# Patient Record
Sex: MALE | Race: WHITE | NOT HISPANIC OR LATINO | Employment: OTHER | URBAN - NONMETROPOLITAN AREA
[De-identification: names, ages, dates, MRNs, and addresses within clinical notes are randomized per-mention and may not be internally consistent; named-entity substitution may affect disease eponyms.]

---

## 2017-02-05 ENCOUNTER — TRANSFERRED RECORDS (OUTPATIENT)
Dept: HEALTH INFORMATION MANAGEMENT | Facility: OTHER | Age: 62
End: 2017-02-05

## 2017-08-14 ENCOUNTER — HISTORY (OUTPATIENT)
Dept: EMERGENCY MEDICINE | Facility: OTHER | Age: 62
End: 2017-08-14

## 2017-08-16 ENCOUNTER — HISTORY (OUTPATIENT)
Dept: EMERGENCY MEDICINE | Facility: OTHER | Age: 62
End: 2017-08-16

## 2017-08-16 ENCOUNTER — HOSPITAL ENCOUNTER (EMERGENCY)
Dept: EMERGENCY MEDICINE | Facility: OTHER | Age: 62
Discharge: HOME | End: 2017-08-16
Payer: COMMERCIAL

## 2017-08-16 DIAGNOSIS — M10.472 OTHER SECONDARY GOUT, LEFT ANKLE AND FOOT: ICD-10-CM

## 2017-08-24 ENCOUNTER — COMMUNICATION - GICH (OUTPATIENT)
Dept: MEDSURG UNIT | Facility: OTHER | Age: 62
End: 2017-08-24

## 2017-11-10 ENCOUNTER — TRANSFERRED RECORDS (OUTPATIENT)
Dept: HEALTH INFORMATION MANAGEMENT | Facility: OTHER | Age: 62
End: 2017-11-10

## 2017-12-01 ENCOUNTER — TRANSFERRED RECORDS (OUTPATIENT)
Dept: HEALTH INFORMATION MANAGEMENT | Facility: OTHER | Age: 62
End: 2017-12-01

## 2017-12-28 NOTE — TELEPHONE ENCOUNTER
Patient Information     Patient Name MRN Josué Robles [id number] Male 1955      Telephone Encounter by Lizeth Wadsworth RN at 2017  3:33 PM     Author:  Lizeth Wadsworth RN Service:  (none) Author Type:  NURS- Registered Nurse     Filed:  2017  3:37 PM Encounter Date:  2017 Status:  Signed     :  Lizeth Wadsworth RN (NURS- Registered Nurse)            Patient called regarding follow up appointment. Patient unsure of location. Researched and identified MD he is seeing practices at [facility] in [place]

## 2017-12-29 NOTE — ED NOTES
"Patient Information     Patient Name MRN Josué Robles [id number] Male 1955      ED Nursing Note by Arielle Chau RN at 2017  8:58 PM     Author:  Arielle Chau RN  Service:  (none) Author Type:  NURS- Registered Nurse     Filed:  2017  9:09 PM  Date of Service:  2017  8:58 PM Status:  Addendum     :  Arielle Chau RN (NURS- Registered Nurse)        Related Notes: Original Note by Arielle Chau RN (NURS- Registered Nurse) filed at 2017  9:00 PM            Pt admit to [facility], ambulatory, limp noted.    ED Nursing Triage Note (General)   ________________________________    Significant symptoms per patient include pt had a cramp in his foot last NOC (and the night before), it happened again today, he looked at his foot and it's red, swollen, and warm to the touch.  He was just discharged from the hospital at 1500 today.   Patient is alert with cooperative.behavior, Breathing noted as easy, non labored and General appearance noted as unremarkable with skin of normal color    Pre hospital prior living situation: Home     /84  Pulse 93  Temp 98  F (36.7  C)  Resp 18  Ht 1.765 m (5' 9.5\")  Wt 63.5 kg (140 lb)  SpO2 98%  BMI 20.38 kg/m2           "

## 2017-12-29 NOTE — ED PROVIDER NOTES
Patient Information     Patient Name MRN Sex Josué Mckeon [id number] Male 1955      ED Provider Note by Bharath Reyes DO at 2017  9:24 PM     Author:  Bharath Reyes DO Service:  (none) Author Type:  Physician     Filed:  2017 12:10 AM Date of Service:  2017  9:24 PM Status:  Signed     :  Bharath Reyes DO (Physician)            Patient:  Josué Álvarez  MRN: [id number] : 1955  Date of Service:  17      Subjective:    HPI:  Josué Álvarez is a 61 y.o. male w/ PMH pertinent for alcohol abuse presenting to the ED with cc of L foot pain & swelling.  Onset last night with pain, then mild swelling & small amount of redness to the front part of his foot.  No prior hx.  No recent trauma or injuries.  Admits to a heavy drinking binge this weekend with an unsure, but excessive amount of alcohol consumed.  No other complaints or concerns.  Was in the hospital recently for diverticulitis, and d/c'd today on cipro & flagyl which he has been taking as prescribed.  No N/V/D.  Feels otherwise well & at his baseline.    ROS:    A 10 point review of systems was conducted; pertinent positives and negatives as per HPI.    PMH:   Past Medical History:     Diagnosis  Date     Diverticulitis        PSH: No past surgical history on file.    SOC:   Social History        Substance Use Topics          Smoking status:   Former Smoker      Quit date:  10/30/2014      Smokeless tobacco:   Never Used      Alcohol use   Yes      Comment: rare          FAM: No family history on file.     ALL:  No Known Allergies    Meds:   No current facility-administered medications on file prior to encounter.      Current Outpatient Prescriptions on File Prior to Encounter       Medication  Sig Dispense Refill     atorvastatin (LIPITOR) 20 mg tablet Take 20 mg by mouth at bedtime.       busPIRone (BUSPAR) 15 mg tablet Take 15 mg by mouth 2 times daily if needed for Other (Specify).        "ciprofloxacin HCl (CIPRO) 500 mg tablet Take 1 tablet by mouth 2 times daily for 13 days. Indications: ABDOMEN/PELVIS INFECTION 26 tablet 0     fluticasone (50 mcg per actuation) nasal solution (FLONASE) Inhale 1 Spray into both nostrils once daily.       metroNIDAZOLE (FLAGYL) 500 mg tablet Take 1 tablet by mouth 3 times daily for 13 days. Indications: ABDOMEN/PELVIS INFECTION 39 tablet 0       Objective:    VS:   /84  Pulse 93  Temp 98  F (36.7  C)  Resp 18  Ht 1.765 m (5' 9.5\")  Wt 63.5 kg (140 lb)  SpO2 98%  BMI 20.38 kg/m2     Physical Exam:     Gen:  Alert, nontoxic, NAD.     HEENT:  Normocephalic, PERRLA, no scleral icterus.     Neck:  Trachea midline, supple   Lungs:  CTAB, no obvious w/c/r.    Cardio:  Regular, s1/s2, no obvious m/r/g   Abd:  Soft, nontender, spleen non-palpable.  No hepatomegaly.  No CVA tenderness.   Ext:  No peripheral edema.     Neuro:  A&Ox4, CN II-XII grossly intact.  Ambulatory with normal gait.     MSK: no obvious areas of cellulitis.  Normal ROM of major joints.  Markedly tender L foot with mild swelling to the anterior foot and minimal redness.     Skin: warm, dry, no obvious rash.      Orders Place This Encounter:   No orders of the defined types were placed in this encounter.      RN & Ancillary Notes:   I have reviewed nursing & ancillary notes, and agree with protocol as initiated.      Medical Decision Making:    Physical exam & hx consistent with an acute flare of gout.  Pt declined imaging as stated he for sure did not injure his foot.  No clinical evidence of cellulitis & no obvious skin break down to suggest it.  No obvious large target for arthrocentesis so will defer it.  Discussed the pt's heavy drinking and this likely being a precipitant for his acute flare.  Plan will be for ibuprofen for several days for pain & inflammation.  F/u with PCP in 2 days or return back to the ED with any worsening complaints or concerns.    All questions & concerns addressed " prior to discharge.      Final Clinical Impression:  Likely gout of L foot    PROCEDURES:   none    CRITICAL CARE:  I personally provided critical care time of n/a minutes.  (This excludes all time required for performed procedures).      Bharath Reyes DO  8/16/2017  9:24 PM  [facility]  Combined Emergency / Internal Medicine Physician

## 2017-12-29 NOTE — ED NOTES
Patient Information     Patient Name MRN Sex Josué Mckeon [id number] Male 1955      ED Nursing Note by Arielle Chau RN at 2017  9:36 PM     Author:  Arielle Chau RN Service:  (none) Author Type:  NURS- Registered Nurse     Filed:  2017  9:36 PM Date of Service:  2017  9:36 PM Status:  Signed     :  Arielle Chau RN (NURS- Registered Nurse)            ED Nursing Discharge Note  ________________________________    Josué Álvarez will be discharged via Private Car    Patient  Verbalized understanding of discharge instructions including medication administration and recommended follow up care as noted on discharge instructions.  Written discharge instructions given, denies any further questions. Prescriptions were printed and sent with patient. Belongings sent with patient.  Barriers to learning identified and addressed:  None observed    Pain Level:  Acute Pain Intensity (0-10): Worst pain imaginable    IV Fluids: N/A.

## 2017-12-29 NOTE — ED NOTES
Patient Information     Patient Name MRN Sex Josué Mckeon [id number] Male 1955      ED Nursing Note by Arielle Chau RN at 2017  9:10 PM     Author:  Arielle Cahu RN Service:  (none) Author Type:  NURS- Registered Nurse     Filed:  2017  9:18 PM Date of Service:  2017  9:10 PM Status:  Signed     :  Arielle Chau RN (NURS- Registered Nurse)            ED Nursing Assessment  ________________________________    GENERAL APPEARANCE:   Alert  EXTREMITIES/SKIN:   Redness and swelling of left foot  NEUROLOGIC:   No apparent problem  MUSCULOSKELETAL:   EXTREMETIES   Affected Extremity Left lower extremity foot  CMS Impaired pain/tenderness tender

## 2018-02-06 ENCOUNTER — DOCUMENTATION ONLY (OUTPATIENT)
Dept: FAMILY MEDICINE | Facility: OTHER | Age: 63
End: 2018-02-06

## 2018-02-06 PROBLEM — K57.92 ACUTE DIVERTICULITIS: Status: ACTIVE | Noted: 2017-08-15

## 2018-02-06 RX ORDER — BUSPIRONE HYDROCHLORIDE 15 MG/1
15 TABLET ORAL 2 TIMES DAILY PRN
COMMUNITY
End: 2019-08-06

## 2018-02-06 RX ORDER — IBUPROFEN 600 MG/1
600 TABLET, FILM COATED ORAL 4 TIMES DAILY PRN
COMMUNITY
Start: 2017-08-16 | End: 2018-09-18

## 2018-02-06 RX ORDER — ATORVASTATIN CALCIUM 20 MG/1
20 TABLET, FILM COATED ORAL AT BEDTIME
COMMUNITY
Start: 2017-08-10 | End: 2020-01-06

## 2018-02-06 RX ORDER — FLUTICASONE PROPIONATE 50 MCG
1 SPRAY, SUSPENSION (ML) NASAL DAILY
COMMUNITY
End: 2019-10-20

## 2018-02-27 ENCOUNTER — TELEPHONE (OUTPATIENT)
Dept: SURGERY | Facility: OTHER | Age: 63
End: 2018-02-27

## 2018-02-27 NOTE — TELEPHONE ENCOUNTER
OK to schedule for a diagnostic colonoscopy due to recent diverticulitis-in September per records. Ellie Roberto MD on 2/27/2018 at 12:12 PM

## 2018-02-27 NOTE — TELEPHONE ENCOUNTER
Screening Questions for the Scheduling of Screening Colonoscopies   (If Colonoscopy is diagnostic, Provider should review the chart before scheduling.)  Are you younger than 50 or older than 80?  NO   Do you take aspirin or fish oil?   BOTH    (if yes, tell patient to stop 1 week prior to Colonoscopy)  Do you take warfarin (Coumadin), clopidogrel (Plavix), apixaban (Eliquis), dabigatram (Pradaxa), rivaroxaban (Xarelto) or any blood thinner? NO   Do you use oxygen at home?  NO   Do you have kidney disease? NO   Are you on dialysis? NO   Have you had a stroke or heart attack in the last year? NO   Have you had a stent in your heart or any blood vessel in the last year? NO   Have you had a transplant of any organ? NO   Have you had a colonoscopy or upper endoscopy (EGD) before? YES          When?  2014  -  [facility]   Date of scheduled Colonoscopy. 04/06/2018  Provider ANN   Pharmacy - Patient already has prep - He had been scheduled at [facility] , but wanted to have done at [facility]    ** Patient needed a Friday due to travel **

## 2018-02-27 NOTE — TELEPHONE ENCOUNTER
Patient called to schedule a colonoscopy. Notes have been faxed from [facility] . Please advise.  Thank you.  Lazara Garcia on 2/27/2018 at 10:17 AM

## 2018-04-06 ENCOUNTER — HOSPITAL ENCOUNTER (OUTPATIENT)
Facility: OTHER | Age: 63
Discharge: HOME | End: 2018-04-06
Attending: SURGERY | Admitting: SURGERY
Payer: COMMERCIAL

## 2018-04-06 ENCOUNTER — SURGERY (OUTPATIENT)
Age: 63
End: 2018-04-06

## 2018-04-06 ENCOUNTER — ANESTHESIA (OUTPATIENT)
Dept: SURGERY | Facility: OTHER | Age: 63
End: 2018-04-06
Payer: COMMERCIAL

## 2018-04-06 ENCOUNTER — ANESTHESIA EVENT (OUTPATIENT)
Dept: SURGERY | Facility: OTHER | Age: 63
End: 2018-04-06
Payer: COMMERCIAL

## 2018-04-06 VITALS
HEART RATE: 66 BPM | TEMPERATURE: 97.4 F | DIASTOLIC BLOOD PRESSURE: 87 MMHG | RESPIRATION RATE: 16 BRPM | WEIGHT: 150.6 LBS | SYSTOLIC BLOOD PRESSURE: 126 MMHG | OXYGEN SATURATION: 98 %

## 2018-04-06 PROCEDURE — 27210995 ZZH RX 272: Performed by: SURGERY

## 2018-04-06 PROCEDURE — 45385 COLONOSCOPY W/LESION REMOVAL: CPT | Mod: PT | Performed by: SURGERY

## 2018-04-06 PROCEDURE — 25000132 ZZH RX MED GY IP 250 OP 250 PS 637: Performed by: SURGERY

## 2018-04-06 PROCEDURE — 45380 COLONOSCOPY AND BIOPSY: CPT | Performed by: SURGERY

## 2018-04-06 PROCEDURE — 25000125 ZZHC RX 250: Performed by: NURSE ANESTHETIST, CERTIFIED REGISTERED

## 2018-04-06 PROCEDURE — 45385 COLONOSCOPY W/LESION REMOVAL: CPT | Performed by: NURSE ANESTHETIST, CERTIFIED REGISTERED

## 2018-04-06 PROCEDURE — 88305 TISSUE EXAM BY PATHOLOGIST: CPT | Performed by: SURGERY

## 2018-04-06 PROCEDURE — 25000128 H RX IP 250 OP 636: Performed by: SURGERY

## 2018-04-06 PROCEDURE — 45385 COLONOSCOPY W/LESION REMOVAL: CPT | Performed by: ANESTHESIOLOGY

## 2018-04-06 PROCEDURE — 25000128 H RX IP 250 OP 636: Performed by: NURSE ANESTHETIST, CERTIFIED REGISTERED

## 2018-04-06 PROCEDURE — 40000010 ZZH STATISTIC ANES STAT CODE-CRNA PER MINUTE: Performed by: SURGERY

## 2018-04-06 RX ORDER — ONDANSETRON 2 MG/ML
4 INJECTION INTRAMUSCULAR; INTRAVENOUS
Status: DISCONTINUED | OUTPATIENT
Start: 2018-04-06 | End: 2018-04-06 | Stop reason: HOSPADM

## 2018-04-06 RX ORDER — FLUMAZENIL 0.1 MG/ML
0.2 INJECTION, SOLUTION INTRAVENOUS
Status: CANCELLED | OUTPATIENT
Start: 2018-04-06 | End: 2018-04-06

## 2018-04-06 RX ORDER — NALOXONE HYDROCHLORIDE 0.4 MG/ML
.1-.4 INJECTION, SOLUTION INTRAMUSCULAR; INTRAVENOUS; SUBCUTANEOUS
Status: CANCELLED | OUTPATIENT
Start: 2018-04-06 | End: 2018-04-07

## 2018-04-06 RX ORDER — PROPOFOL 10 MG/ML
INJECTION, EMULSION INTRAVENOUS CONTINUOUS PRN
Status: DISCONTINUED | OUTPATIENT
Start: 2018-04-06 | End: 2018-04-06

## 2018-04-06 RX ORDER — LIDOCAINE HYDROCHLORIDE 20 MG/ML
INJECTION, SOLUTION INFILTRATION; PERINEURAL PRN
Status: DISCONTINUED | OUTPATIENT
Start: 2018-04-06 | End: 2018-04-06

## 2018-04-06 RX ORDER — SIMETHICONE
LIQUID (ML) MISCELLANEOUS PRN
Status: DISCONTINUED | OUTPATIENT
Start: 2018-04-06 | End: 2018-04-06 | Stop reason: HOSPADM

## 2018-04-06 RX ORDER — PROPOFOL 10 MG/ML
INJECTION, EMULSION INTRAVENOUS PRN
Status: DISCONTINUED | OUTPATIENT
Start: 2018-04-06 | End: 2018-04-06

## 2018-04-06 RX ORDER — SODIUM CHLORIDE, SODIUM LACTATE, POTASSIUM CHLORIDE, CALCIUM CHLORIDE 600; 310; 30; 20 MG/100ML; MG/100ML; MG/100ML; MG/100ML
INJECTION, SOLUTION INTRAVENOUS CONTINUOUS
Status: CANCELLED | OUTPATIENT
Start: 2018-04-06

## 2018-04-06 RX ORDER — SODIUM CHLORIDE, SODIUM LACTATE, POTASSIUM CHLORIDE, CALCIUM CHLORIDE 600; 310; 30; 20 MG/100ML; MG/100ML; MG/100ML; MG/100ML
INJECTION, SOLUTION INTRAVENOUS CONTINUOUS
Status: DISCONTINUED | OUTPATIENT
Start: 2018-04-06 | End: 2018-04-06 | Stop reason: HOSPADM

## 2018-04-06 RX ORDER — LIDOCAINE 40 MG/G
CREAM TOPICAL
Status: DISCONTINUED | OUTPATIENT
Start: 2018-04-06 | End: 2018-04-06 | Stop reason: HOSPADM

## 2018-04-06 RX ADMIN — PROPOFOL 50 MG: 10 INJECTION, EMULSION INTRAVENOUS at 08:57

## 2018-04-06 RX ADMIN — PROPOFOL 100 MG: 10 INJECTION, EMULSION INTRAVENOUS at 08:53

## 2018-04-06 RX ADMIN — Medication 0.01 ML: at 09:32

## 2018-04-06 RX ADMIN — WATER 100 ML: 1 IRRIGANT IRRIGATION at 09:32

## 2018-04-06 RX ADMIN — PROPOFOL 50 MG: 10 INJECTION, EMULSION INTRAVENOUS at 09:28

## 2018-04-06 RX ADMIN — SODIUM CHLORIDE, SODIUM LACTATE, POTASSIUM CHLORIDE, AND CALCIUM CHLORIDE: 600; 310; 30; 20 INJECTION, SOLUTION INTRAVENOUS at 08:00

## 2018-04-06 RX ADMIN — PROPOFOL 50 MG: 10 INJECTION, EMULSION INTRAVENOUS at 09:06

## 2018-04-06 RX ADMIN — LIDOCAINE HYDROCHLORIDE 80 MG: 20 INJECTION, SOLUTION INFILTRATION; PERINEURAL at 08:52

## 2018-04-06 RX ADMIN — PROPOFOL 140 MCG/KG/MIN: 10 INJECTION, EMULSION INTRAVENOUS at 08:53

## 2018-04-06 ASSESSMENT — LIFESTYLE VARIABLES: TOBACCO_USE: 1

## 2018-04-06 NOTE — H&P
PRE-PROCEDURE NOTE    CHIEFCOMPLAINT / REASON FOR PROCEDURE:  Screening for polyps and colorectal cancer.    PERTINENT HISTORY   Patient is due for colonoscopy. Previous colonoscopy 3 years ago. Hx of polyps and diverticulitis. No family history of colon polyps or colon cancer.    Past Medical History:   Diagnosis Date     Diverticulitis of intestine without perforation or abscess without bleeding     No Comments Provided       Past Surgical History:   Procedure Laterality Date     COLONOSCOPY           Other:  None  Bleeding tendencies: No     Relevant Family History:  None     Relevant Social History:  None     10 point ROS of systems including Constitutional, Eyes, Respiratory, Cardiovascular, Gastroenterology, Genitourinary, Integumentary, Muscularskeletal, Psychiatric were all negative except for pertinent positives noted in my HPI.      ALLERGIES/SENSITIVITIES: No Known Allergies     CURRENT MEDICATIONS:      No current facility-administered medications on file prior to encounter.   Current Outpatient Prescriptions on File Prior to Encounter:  busPIRone (BUSPAR) 15 MG tablet Take 15 mg by mouth 2 times daily as needed   fluticasone (FLONASE) 50 MCG/ACT spray Spray 1 spray in nostril daily   ibuprofen (ADVIL/MOTRIN) 600 MG tablet Take 600 mg by mouth 4 times daily as needed   atorvastatin (LIPITOR) 20 MG tablet Take 20 mg by mouth At Bedtime           PRE-SEDATION ASSESSMENT:    LUNGS:  CTA B/L, no wheezing or crackles.  Heart & CV:  RRR no murmur.  Intact distal pulses, good cap refill.    Comment(s):      IMPRESSION: 62 year old male in need of screening colonoscopy.    PLAN:  I discussed screening colonoscopy with the patient. Anesthesia coverage requested.    Morgan Perdue    4/6/2018 8:45 AM

## 2018-04-06 NOTE — IP AVS SNAPSHOT
MRN:[id number]                      After Visit Summary   4/6/2018    Josué Álvarez    MRN: [id number]           Thank you!     Thank you for choosing [facility] for your care. Our goal is always to provide you with excellent care. Hearing back from our patients is one way we can continue to improve our services. Please take a few minutes to complete the written survey that you may receive in the mail after you visit with us. Thank you!        Patient Information     Date Of Birth          1955        Designated Caregiver       Most Recent Value    Caregiver    Will someone help with your care after discharge? yes    Name of designated caregiver Mayelin James      About your hospital stay     You were admitted on:  April 6, 2018 You last received care in the:  [facility]    You were discharged on:  April 6, 2018       Who to Call     For medical emergencies, please call 911.  For non-urgent questions about your medical care, please call your primary care provider or clinic, None  For questions related to your surgery, please call your surgery clinic        Attending Provider     Provider Specialty    Morgan Perdue MD Surgery       Primary Care Provider Fax #    Provider Not In System [phone]      Further instructions from your care team       [facility]   Adult Discharge Orders & Instructions     For 12 hours after surgery    1. Get plenty of rest.  A responsible adult must stay with you for at least 12 hours after you leave the hospital.   2. Do not drive or use heavy equipment.  If you have weakness or tingling, don't drive or use heavy equipment until this feeling goes away.  3. Do not drink alcohol.  4. Avoid strenuous or risky activities.  Ask for help when climbing stairs.   5. You may feel lightheaded.  IF so, sit for a few minutes before standing.  Have someone help you get up.   6. If you have nausea (feel sick to your stomach): Drink only  "clear liquids such as apple juice, ginger ale, broth or 7-Up.  Rest may also help.  Be sure to drink enough fluids.  Move to a regular diet as you feel able.  7. You may have a slight fever. Call the doctor if your fever is over 101 F (38.3 C) (taken under the tongue) or lasts longer than 24 hours.  8. You may have a dry mouth, a sore throat, muscle aches or trouble sleeping.  These should go away after 24 hours.  9. Do not make important or legal decisions.   Call your doctor for any of the followin.  Signs of infection (fever, growing tenderness at the surgery site, a large amount of drainage or bleeding, severe pain, foul-smelling drainage, redness, swelling).    2. It has been over 8 to 10 hours since surgery and you are still not able to urinate (pass water).    3.  Headache for over 24 hours.    4.  Numbness, tingling or weakness the day after surgery (if you had spinal anesthesia).  To contact a doctor, call _________[phone]_______________________    Pending Results     No orders found from 2018 to 2018.            Admission Information     Date & Time Provider Department Dept. Phone    2018 Morgan Perdue MD [facility] [phone]      Your Vitals Were     Blood Pressure Pulse Temperature Respirations Weight Pulse Oximetry    113/78 66 97.4  F (36.3  C) 16 68.3 kg (150 lb 9.6 oz) 98%      MyChart Information     [employer] lets you send messages to your doctor, view your test results, renew your prescriptions, schedule appointments and more. To sign up, go to [email or web address] . Click on \"Log in\" on the left side of the screen, which will take you to the Welcome page. Then click on \"Sign up Now\" on the right side of the page.     You will be asked to enter the access code listed below, as well as some personal information. Please follow the directions to create your username and password.     Your access code is: [email or web address]  Expires: 2018  9:57 AM   "   Your access code will  in 90 days. If you need help or a new code, please call your [facility] clinic or [phone].        Care EveryWhere ID     This is your Care EveryWhere ID. This could be used by other organizations to access your [facility] medical records  [id number]        Equal Access to Services     [place] SORIANONOEMI : Hadii aad ku hadasho Soomaali, waaxda luqadaha, qaybta kaalmada adeegyada, waxjuana singh hayrodolfon adehelen darciscooter nelson. So [facility] [phone].    ATENCIÓN: Si habla español, tiene a cooper disposición servicios gratuitos de asistencia lingüística. Llame al [phone].    We comply with applicable federal civil rights laws and [place] laws. We do not discriminate on the basis of race, color, national origin, age, disability, sex, sexual orientation, or gender identity.               Review of your medicines      UNREVIEWED medicines. Ask your doctor about these medicines        Dose / Directions    ASPIRIN ADULT LOW STRENGTH PO        Dose:  81 mg   Take 81 mg by mouth daily   Refills:  0       atorvastatin 20 MG tablet   Commonly known as:  LIPITOR        Dose:  20 mg   Take 20 mg by mouth At Bedtime   Refills:  0       busPIRone 15 MG tablet   Commonly known as:  BUSPAR        Dose:  15 mg   Take 15 mg by mouth 2 times daily as needed   Refills:  0       fluticasone 50 MCG/ACT spray   Commonly known as:  FLONASE        Dose:  1 spray   Spray 1 spray in nostril daily   Refills:  0       ibuprofen 600 MG tablet   Commonly known as:  ADVIL/MOTRIN        Dose:  600 mg   Take 600 mg by mouth 4 times daily as needed   Refills:  0                Protect others around you: Learn how to safely use, store and throw away your medicines at www.disposemymeds.org.             Medication List: This is a list of all your medications and when to take them. Check marks below indicate your daily home schedule. Keep this list as a reference.      Medications           Morning Afternoon Evening Bedtime As  Needed    ASPIRIN ADULT LOW STRENGTH PO   Take 81 mg by mouth daily                                atorvastatin 20 MG tablet   Commonly known as:  LIPITOR   Take 20 mg by mouth At Bedtime                                busPIRone 15 MG tablet   Commonly known as:  BUSPAR   Take 15 mg by mouth 2 times daily as needed                                fluticasone 50 MCG/ACT spray   Commonly known as:  FLONASE   Spray 1 spray in nostril daily                                ibuprofen 600 MG tablet   Commonly known as:  ADVIL/MOTRIN   Take 600 mg by mouth 4 times daily as needed

## 2018-04-06 NOTE — ANESTHESIA CARE TRANSFER NOTE
Patient: Josué Álvarez    Procedure(s):  Colonoscopy - Wound Class: III-Contaminated    Diagnosis: diverticulitis  Diagnosis Additional Information: No value filed.    Anesthesia Type:   MAC     Note:  Airway :Room Air  Patient transferred to:Phase II  Handoff Report: Identifed the Patient, Identified the Reponsible Provider, Reviewed the pertinent medical history, Discussed the surgical course, Reviewed Intra-OP anesthesia mangement and issues during anesthesia, Set expectations for post-procedure period and Allowed opportunity for questions and acknowledgement of understanding      Vitals: (Last set prior to Anesthesia Care Transfer)    CRNA VITALS  4/6/2018 0912 - 4/6/2018 0942      4/6/2018             EKG: NSR                Electronically Signed By: KEKE GARCIA CRNA  April 6, 2018  9:42 AM

## 2018-04-06 NOTE — OP NOTE
PROCEDURE NOTE    SURGEON:Morgan Perdue    PRE-OP DIAGNOSIS:  Screening Colonoscopy, Hx of polyps      POST-OP DIAGNOSIS: Diverticulosis, Colon polyps    PROCEDURE:  Colonoscopy with cold snare polypectomy     SPECIMEN:  Ascending colon polyp, Sigmoid colon polyps, Rectal polyps    ANESTHESIA:  Monitor Anesthesia Care Anesthesiologist: Bharath Watt DO  CRNA: Na Braga APRN CRNA   Coverage requested due to previous intolerance conscious sedation.     ESTIMATED BLOOD LOSS: none    COMPLICATIONS:  None    INDICATION FOR THE PROCEDURE: The patient is a 62 year old male. The patient presents with hx of polyps. I explained to thepatient the risks, benefits and alternatives to screening colonoscopy for evaluating for polyps and colon cancer. We specifically discussed the risks of bleeding, infection, perforation, potential inability to reach the cecum and the risks of sedation. The patient's questions were answered and the patient wished to proceed. Informed consent paperwork was completed.    PROCEDURE: The patient was taken to the endoscopy suite. Appropriate monitors were attached. The patient was placed in the left lateral decubitus position.Timeout was performed confirming the patient's identity and procedure to be performed.  After appropriate sedation was confirmed, digital rectal exam was performed.  There was normal tone and no gross abnormality was noted.  The lubricated colonoscope was introduced into the anus the colon was insufflated with air. The prep quality was adequate. Under direct visualization the scope was advanced to the cecum. The mucosa of colon was inspected while withdrawing the scope. Diverticulosis was present from the sigmoid through the left colon. There was a small polyp at the hepatic flexure removed with cold snare.  There were 2 sigmoid colon polyps removed with cold snare.  There were 3 rectal polyps removed with cold snare.  The scope was retroflexed in the rectum and the  anorectal junction was inspected. No abnormalities were noted. The scope was returned to aneutral position and the colon was decompressed. The scope was removed. The patient tolerated the procedure with no immediately apparent complication. The patient was taken to recovery in stable condition.    FOLLOW UP: RECOMMEND high fiber diet, will call with pathology results.    Morgan Perdue

## 2018-04-06 NOTE — IP AVS SNAPSHOT
[facility]    [address]    [place] [address]    Phone:  [phone]    Fax:  [phone]                                       After Visit Summary   4/6/2018    Josué Álvarez    MRN: [id number]           After Visit Summary Signature Page     I have received my discharge instructions, and my questions have been answered. I have discussed any challenges I see with this plan with the nurse or doctor.    ..........................................................................................................................................  Patient/Patient Representative Signature      ..........................................................................................................................................  Patient Representative Print Name and Relationship to Patient    ..................................................               ................................................  Date                                            Time    ..........................................................................................................................................  Reviewed by Signature/Title    ...................................................              ..............................................  Date                                                            Time

## 2018-04-06 NOTE — DISCHARGE INSTRUCTIONS
[facility]   Adult Discharge Orders & Instructions     For 12 hours after surgery    1. Get plenty of rest.  A responsible adult must stay with you for at least 12 hours after you leave the hospital.   2. Do not drive or use heavy equipment.  If you have weakness or tingling, don't drive or use heavy equipment until this feeling goes away.  3. Do not drink alcohol.  4. Avoid strenuous or risky activities.  Ask for help when climbing stairs.   5. You may feel lightheaded.  IF so, sit for a few minutes before standing.  Have someone help you get up.   6. If you have nausea (feel sick to your stomach): Drink only clear liquids such as apple juice, ginger ale, broth or 7-Up.  Rest may also help.  Be sure to drink enough fluids.  Move to a regular diet as you feel able.  7. You may have a slight fever. Call the doctor if your fever is over 101 F (38.3 C) (taken under the tongue) or lasts longer than 24 hours.  8. You may have a dry mouth, a sore throat, muscle aches or trouble sleeping.  These should go away after 24 hours.  9. Do not make important or legal decisions.   Call your doctor for any of the followin.  Signs of infection (fever, growing tenderness at the surgery site, a large amount of drainage or bleeding, severe pain, foul-smelling drainage, redness, swelling).    2. It has been over 8 to 10 hours since surgery and you are still not able to urinate (pass water).    3.  Headache for over 24 hours.    4.  Numbness, tingling or weakness the day after surgery (if you had spinal anesthesia).  To contact a doctor, call _________[phone]_______________________

## 2018-04-06 NOTE — ANESTHESIA POSTPROCEDURE EVALUATION
Patient: Josué Álvarez    Procedure(s):  Colonoscopy - Wound Class: III-Contaminated    Diagnosis:diverticulitis  Diagnosis Additional Information: No value filed.    Anesthesia Type:  MAC    Note:  Anesthesia Post Evaluation    Patient location during evaluation: Phase 2  Patient participation: Able to fully participate in evaluation  Level of consciousness: awake and alert  Pain management: adequate  Airway patency: patent  Cardiovascular status: acceptable  Respiratory status: acceptable  Hydration status: acceptable  PONV: none             Last vitals:  Vitals:    04/06/18 0740 04/06/18 0942 04/06/18 0945   BP: (!) 142/93 113/78    Pulse: 66     Resp:  16    Temp: 98.1  F (36.7  C) 97.4  F (36.3  C)    SpO2: 98% 98% 98%         Electronically Signed By: KEKE GARCIA CRNA  April 6, 2018  10:35 AM

## 2018-04-06 NOTE — ANESTHESIA PREPROCEDURE EVALUATION
Anesthesia Evaluation     . Pt has had prior anesthetic. Type: MAC           ROS/MED HX    ENT/Pulmonary:  - neg pulmonary ROS   (+)tobacco use, Past use , . .    Neurologic:  - neg neurologic ROS     Cardiovascular:  - neg cardiovascular ROS       METS/Exercise Tolerance:     Hematologic:  - neg hematologic  ROS       Musculoskeletal:  - neg musculoskeletal ROS       GI/Hepatic:  - neg GI/hepatic ROS       Renal/Genitourinary:     (+) chronic renal disease, Pt does not require dialysis, Pt has no history of transplant,       Endo:  - neg endo ROS       Psychiatric:  - neg psychiatric ROS       Infectious Disease:  - neg infectious disease ROS       Malignancy:      - no malignancy   Other:    (+) No chance of pregnancy C-spine cleared: N/A, no H/O Chronic Pain,no other significant disability                    Physical Exam  Normal systems: cardiovascular and pulmonary    Airway     Dental     Cardiovascular       Pulmonary                     Anesthesia Plan      History & Physical Review      ASA Status:  2 .    NPO Status:  > 8 hours    Plan for MAC Maintenance will be TIVA.           Postoperative Care      Consents  Anesthetic plan, risks, benefits and alternatives discussed with: .  Use of blood products discussed: No .   .                         .

## 2018-08-17 ENCOUNTER — TRANSFERRED RECORDS (OUTPATIENT)
Dept: HEALTH INFORMATION MANAGEMENT | Facility: OTHER | Age: 63
End: 2018-08-17

## 2018-09-17 ENCOUNTER — HOSPITAL ENCOUNTER (EMERGENCY)
Facility: OTHER | Age: 63
Discharge: HOME | End: 2018-09-17
Attending: EMERGENCY MEDICINE | Admitting: EMERGENCY MEDICINE
Payer: COMMERCIAL

## 2018-09-17 ENCOUNTER — APPOINTMENT (OUTPATIENT)
Dept: GENERAL RADIOLOGY | Facility: OTHER | Age: 63
End: 2018-09-17
Attending: EMERGENCY MEDICINE
Payer: COMMERCIAL

## 2018-09-17 VITALS
SYSTOLIC BLOOD PRESSURE: 126 MMHG | OXYGEN SATURATION: 97 % | WEIGHT: 150 LBS | DIASTOLIC BLOOD PRESSURE: 84 MMHG | BODY MASS INDEX: 21.47 KG/M2 | RESPIRATION RATE: 16 BRPM | HEART RATE: 72 BPM | TEMPERATURE: 97.5 F | HEIGHT: 70 IN

## 2018-09-17 DIAGNOSIS — K59.00 CONSTIPATION, UNSPECIFIED CONSTIPATION TYPE: ICD-10-CM

## 2018-09-17 DIAGNOSIS — Z87.19 HISTORY OF DIVERTICULITIS: ICD-10-CM

## 2018-09-17 LAB
ALBUMIN SERPL-MCNC: 4.3 G/DL (ref 3.5–5.7)
ALBUMIN UR-MCNC: NEGATIVE MG/DL
ALP SERPL-CCNC: 59 U/L (ref 34–104)
ALT SERPL W P-5'-P-CCNC: 26 U/L (ref 7–52)
ANION GAP SERPL CALCULATED.3IONS-SCNC: 10 MMOL/L (ref 3–14)
APPEARANCE UR: CLEAR
AST SERPL W P-5'-P-CCNC: 17 U/L (ref 13–39)
BACTERIA #/AREA URNS HPF: ABNORMAL /HPF
BASOPHILS # BLD AUTO: 0.1 10E9/L (ref 0–0.2)
BASOPHILS NFR BLD AUTO: 0.4 %
BILIRUB SERPL-MCNC: 0.4 MG/DL (ref 0.3–1)
BILIRUB UR QL STRIP: NEGATIVE
BUN SERPL-MCNC: 24 MG/DL (ref 7–25)
CALCIUM SERPL-MCNC: 10 MG/DL (ref 8.6–10.3)
CAOX CRY #/AREA URNS HPF: ABNORMAL /HPF
CHLORIDE SERPL-SCNC: 102 MMOL/L (ref 98–107)
CO2 SERPL-SCNC: 26 MMOL/L (ref 21–31)
COLOR UR AUTO: YELLOW
CREAT SERPL-MCNC: 1.08 MG/DL (ref 0.7–1.3)
DIFFERENTIAL METHOD BLD: ABNORMAL
EOSINOPHIL # BLD AUTO: 0.5 10E9/L (ref 0–0.7)
EOSINOPHIL NFR BLD AUTO: 4.3 %
ERYTHROCYTE [DISTWIDTH] IN BLOOD BY AUTOMATED COUNT: 12.1 % (ref 10–15)
GFR SERPL CREATININE-BSD FRML MDRD: 69 ML/MIN/1.7M2
GLUCOSE SERPL-MCNC: 99 MG/DL (ref 70–105)
GLUCOSE UR STRIP-MCNC: NEGATIVE MG/DL
HCT VFR BLD AUTO: 40.2 % (ref 40–53)
HGB BLD-MCNC: 14.5 G/DL (ref 13.3–17.7)
HGB UR QL STRIP: ABNORMAL
IMM GRANULOCYTES # BLD: 0 10E9/L (ref 0–0.4)
IMM GRANULOCYTES NFR BLD: 0.2 %
KETONES UR STRIP-MCNC: NEGATIVE MG/DL
LEUKOCYTE ESTERASE UR QL STRIP: NEGATIVE
LYMPHOCYTES # BLD AUTO: 2.1 10E9/L (ref 0.8–5.3)
LYMPHOCYTES NFR BLD AUTO: 16.8 %
MCH RBC QN AUTO: 33.3 PG (ref 26.5–33)
MCHC RBC AUTO-ENTMCNC: 36.1 G/DL (ref 31.5–36.5)
MCV RBC AUTO: 92 FL (ref 78–100)
MONOCYTES # BLD AUTO: 1.2 10E9/L (ref 0–1.3)
MONOCYTES NFR BLD AUTO: 9.9 %
MUCOUS THREADS #/AREA URNS LPF: PRESENT /LPF
NEUTROPHILS # BLD AUTO: 8.5 10E9/L (ref 1.6–8.3)
NEUTROPHILS NFR BLD AUTO: 68.4 %
NITRATE UR QL: NEGATIVE
NON-SQ EPI CELLS #/AREA URNS LPF: ABNORMAL /LPF
PH UR STRIP: 6 PH (ref 5–9)
PLATELET # BLD AUTO: 210 10E9/L (ref 150–450)
POTASSIUM SERPL-SCNC: 4.1 MMOL/L (ref 3.5–5.1)
PROT SERPL-MCNC: 7 G/DL (ref 6.4–8.9)
RBC # BLD AUTO: 4.35 10E12/L (ref 4.4–5.9)
RBC #/AREA URNS AUTO: ABNORMAL /HPF
SODIUM SERPL-SCNC: 138 MMOL/L (ref 134–144)
SOURCE: ABNORMAL
SP GR UR STRIP: 1.02 (ref 1–1.03)
UROBILINOGEN UR STRIP-ACNC: 0.2 EU/DL (ref 0.2–1)
WBC # BLD AUTO: 12.4 10E9/L (ref 4–11)
WBC #/AREA URNS AUTO: ABNORMAL /HPF

## 2018-09-17 PROCEDURE — 81001 URINALYSIS AUTO W/SCOPE: CPT | Performed by: EMERGENCY MEDICINE

## 2018-09-17 PROCEDURE — 99283 EMERGENCY DEPT VISIT LOW MDM: CPT | Mod: Z6 | Performed by: EMERGENCY MEDICINE

## 2018-09-17 PROCEDURE — 25000132 ZZH RX MED GY IP 250 OP 250 PS 637: Performed by: EMERGENCY MEDICINE

## 2018-09-17 PROCEDURE — 74019 RADEX ABDOMEN 2 VIEWS: CPT

## 2018-09-17 PROCEDURE — 36415 COLL VENOUS BLD VENIPUNCTURE: CPT | Performed by: EMERGENCY MEDICINE

## 2018-09-17 PROCEDURE — 99284 EMERGENCY DEPT VISIT MOD MDM: CPT | Mod: 25 | Performed by: EMERGENCY MEDICINE

## 2018-09-17 PROCEDURE — 85025 COMPLETE CBC W/AUTO DIFF WBC: CPT | Performed by: EMERGENCY MEDICINE

## 2018-09-17 PROCEDURE — 80053 COMPREHEN METABOLIC PANEL: CPT | Performed by: EMERGENCY MEDICINE

## 2018-09-17 RX ORDER — HYDROCODONE BITARTRATE AND ACETAMINOPHEN 5; 325 MG/1; MG/1
1 TABLET ORAL ONCE
Status: DISCONTINUED | OUTPATIENT
Start: 2018-09-17 | End: 2018-09-17

## 2018-09-17 RX ORDER — CIPROFLOXACIN 500 MG/1
500 TABLET, FILM COATED ORAL 2 TIMES DAILY
Qty: 20 TABLET | Refills: 0 | Status: SHIPPED | OUTPATIENT
Start: 2018-09-17 | End: 2018-09-27

## 2018-09-17 RX ORDER — METRONIDAZOLE 500 MG/1
500 TABLET ORAL 4 TIMES DAILY
Qty: 40 TABLET | Refills: 0 | Status: SHIPPED | OUTPATIENT
Start: 2018-09-17 | End: 2018-09-27

## 2018-09-17 RX ADMIN — IBUPROFEN 600 MG: 200 TABLET, FILM COATED ORAL at 02:27

## 2018-09-17 RX ADMIN — MAGNESIUM HYDROXIDE 30 ML: 400 SUSPENSION ORAL at 02:26

## 2018-09-17 ASSESSMENT — ENCOUNTER SYMPTOMS
FEVER: 0
SHORTNESS OF BREATH: 0
DYSURIA: 0
AGITATION: 0
VOMITING: 0
CHILLS: 0
CHOKING: 0
NAUSEA: 0
ABDOMINAL PAIN: 1
LIGHT-HEADEDNESS: 0
ARTHRALGIAS: 0

## 2018-09-17 NOTE — ED NOTES
Pt returned from radiology.  Unable to void at this time.  Will put call light on when he is able to go.

## 2018-09-17 NOTE — ED TRIAGE NOTES
Pt presents to the ER with complaint of lower abdominal pain, cramping, nauseated.  Pt has history of diverticulitis.

## 2018-09-17 NOTE — ED PROVIDER NOTES
History     Chief Complaint   Patient presents with     Abdominal Pain     Patient is a 63 year old male presenting with abdominal pain. The history is provided by the patient and a relative.   Abdominal Pain   Associated symptoms: no chest pain, no chills, no dysuria, no fever, no nausea, no shortness of breath and no vomiting      Josué Álvarez is a 63 year old male who is here with his daughter complaining of lower abdominal pain.  He has a history of diverticulitis and they are worried about this.  It just began earlier in the day.  It is pressure and crampy feeling.  He feels like he has to go to the bathroom but when he goes there is not much of anything there.  No fevers or chills.  No nausea vomiting.  Pain is in the suprapubic area and slightly to the left.    Problem List:    Patient Active Problem List    Diagnosis Date Noted     Acute diverticulitis 08/15/2017     Priority: Medium     Abdominal aortic atherosclerosis (H) 10/30/2016     Priority: Medium     Acute diverticulitis of intestine 10/30/2016     Priority: Medium     Continuous LLQ abdominal pain 10/30/2016     Priority: Medium     Cyst of right kidney 10/30/2016     Priority: Medium     Diarrhea 10/30/2016     Priority: Medium     History of prostate biopsy 10/30/2016     Priority: Medium     History of tobacco abuse 10/30/2016     Priority: Medium     Left inguinal hernia 10/30/2016     Priority: Medium        Past Medical History:    Past Medical History:   Diagnosis Date     Abdominal aortic atherosclerosis (H)      Cyst of right kidney      Diverticulitis of intestine without perforation or abscess without bleeding      History of prostate biopsy      History of tobacco abuse      Left inguinal hernia      LLQ abdominal pain        Past Surgical History:    Past Surgical History:   Procedure Laterality Date     COLONOSCOPY       COLONOSCOPY N/A 4/6/2018    Procedure: COMBINED COLONOSCOPY, SINGLE OR MULTIPLE BIOPSY/POLYPECTOMY BY BIOPSY;   "Colonoscopy;  Surgeon: Morgan Perdue MD;  Location:  OR       Family History:    No family history on file.    Social History:  Marital Status:  Single [1]  Social History   Substance Use Topics     Smoking status: Former Smoker     Quit date: 10/30/2014     Smokeless tobacco: Never Used     Alcohol use Yes      Comment: Alcoholic Drinks/day: rare        Medications:      ASPIRIN ADULT LOW STRENGTH PO   atorvastatin (LIPITOR) 20 MG tablet   busPIRone (BUSPAR) 15 MG tablet   ciprofloxacin (CIPRO) 500 MG tablet   fluticasone (FLONASE) 50 MCG/ACT spray   ibuprofen (ADVIL/MOTRIN) 600 MG tablet   metroNIDAZOLE (FLAGYL) 500 MG tablet         Review of Systems   Constitutional: Negative for chills and fever.   HENT: Negative for congestion.    Eyes: Negative for visual disturbance.   Respiratory: Negative for choking and shortness of breath.    Cardiovascular: Negative for chest pain.   Gastrointestinal: Positive for abdominal pain. Negative for nausea and vomiting.   Genitourinary: Negative for dysuria.   Musculoskeletal: Negative for arthralgias.   Skin: Negative for rash.   Neurological: Negative for light-headedness.   Psychiatric/Behavioral: Negative for agitation.       Physical Exam   BP: (!) 153/104  Pulse: 84  Temp: 96.8  F (36  C)  Resp: 16  Height: 176.5 cm (5' 9.5\")  Weight: 68 kg (150 lb)  SpO2: 97 %      Physical Exam   Constitutional: He is oriented to person, place, and time. He appears well-developed and well-nourished. No distress.   HENT:   Head: Normocephalic and atraumatic.   Eyes: Conjunctivae are normal.   Neck: Neck supple.   Cardiovascular: Normal rate, regular rhythm and normal heart sounds.    Pulmonary/Chest: Effort normal and breath sounds normal. No respiratory distress.   Abdominal: Soft. Bowel sounds are normal. He exhibits no distension. There is tenderness in the suprapubic area and left lower quadrant.   Neurological: He is alert and oriented to person, place, and time.   Skin: Skin " is warm. He is not diaphoretic.   Psychiatric: He has a normal mood and affect. His behavior is normal.   Nursing note and vitals reviewed.      ED Course     ED Course     Procedures          X-ray of the abdomen shows stool throughout most of the colon.  No dilated bowel.  No air-fluid levels.  No free air.         Results for orders placed or performed during the hospital encounter of 09/17/18 (from the past 24 hour(s))   CBC with platelets differential   Result Value Ref Range    WBC 12.4 (H) 4.0 - 11.0 10e9/L    RBC Count 4.35 (L) 4.4 - 5.9 10e12/L    Hemoglobin 14.5 13.3 - 17.7 g/dL    Hematocrit 40.2 40.0 - 53.0 %    MCV 92 78 - 100 fl    MCH 33.3 (H) 26.5 - 33.0 pg    MCHC 36.1 31.5 - 36.5 g/dL    RDW 12.1 10.0 - 15.0 %    Platelet Count 210 150 - 450 10e9/L    Diff Method Automated Method     % Neutrophils 68.4 %    % Lymphocytes 16.8 %    % Monocytes 9.9 %    % Eosinophils 4.3 %    % Basophils 0.4 %    % Immature Granulocytes 0.2 %    Absolute Neutrophil 8.5 (H) 1.6 - 8.3 10e9/L    Absolute Lymphocytes 2.1 0.8 - 5.3 10e9/L    Absolute Monocytes 1.2 0.0 - 1.3 10e9/L    Absolute Eosinophils 0.5 0.0 - 0.7 10e9/L    Absolute Basophils 0.1 0.0 - 0.2 10e9/L    Abs Immature Granulocytes 0.0 0 - 0.4 10e9/L   Comprehensive metabolic panel   Result Value Ref Range    Sodium 138 134 - 144 mmol/L    Potassium 4.1 3.5 - 5.1 mmol/L    Chloride 102 98 - 107 mmol/L    Carbon Dioxide 26 21 - 31 mmol/L    Anion Gap 10 3 - 14 mmol/L    Glucose 99 70 - 105 mg/dL    Urea Nitrogen 24 7 - 25 mg/dL    Creatinine 1.08 0.70 - 1.30 mg/dL    GFR Estimate 69 >60 mL/min/1.7m2    GFR Estimate If Black 84 >60 mL/min/1.7m2    Calcium 10.0 8.6 - 10.3 mg/dL    Bilirubin Total 0.4 0.3 - 1.0 mg/dL    Albumin 4.3 3.5 - 5.7 g/dL    Protein Total 7.0 6.4 - 8.9 g/dL    Alkaline Phosphatase 59 34 - 104 U/L    ALT 26 7 - 52 U/L    AST 17 13 - 39 U/L   *UA reflex to Microscopic   Result Value Ref Range    Color Urine Yellow     Appearance Urine  Clear     Glucose Urine Negative NEG^Negative mg/dL    Bilirubin Urine Negative NEG^Negative    Ketones Urine Negative NEG^Negative mg/dL    Specific Gravity Urine 1.025 1.000 - 1.030    Blood Urine Trace (A) NEG^Negative    pH Urine 6.0 5.0 - 9.0 pH    Protein Albumin Urine Negative NEG^Negative mg/dL    Urobilinogen Urine 0.2 0.2 - 1.0 EU/dL    Nitrite Urine Negative NEG^Negative    Leukocyte Esterase Urine Negative NEG^Negative    Source Midstream Urine    Urine Microscopic   Result Value Ref Range    WBC Urine 0 - 5 OTO5^0 - 5 /HPF    RBC Urine 10-25 (A) OTO2^O - 2 /HPF    Squamous Epithelial /LPF Urine Few FEW^Few /LPF    Bacteria Urine Few (A) NEG^Negative /HPF    Calcium Oxalate Few (A) NEG^Negative /HPF    Mucous Urine Present (A) NEG^Negative /LPF       Medications   magnesium hydroxide (MILK OF MAGNESIA) suspension 30 mL (not administered)       Assessments & Plan (with Medical Decision Making)     I have reviewed the nursing notes.    I have reviewed the findings, diagnosis, plan and need for follow up with the patient.  He does appear to have a fair amount of stool in the colon, and his pain could simply be from constipation.  With his history of diverticulitis and a mildly elevated white blood count it is possible he has an early case of this again as well.  I will give him some milk of magnesia now and have him go home.  If this does not work he should try some magnesium citrate.  Once he is cleaned out if his pain does not resolve, then I did give a prescription for Cipro and Flagyl that he could fill at that time.  Patient also has microscopic hematuria.  He has an appointment with his primary physician in about 2 weeks time, I asked that he mention this to his physician so they can recheck this at that time.  Return if worse.    New Prescriptions    CIPROFLOXACIN (CIPRO) 500 MG TABLET    Take 1 tablet (500 mg) by mouth 2 times daily for 10 days    METRONIDAZOLE (FLAGYL) 500 MG TABLET    Take 1  tablet (500 mg) by mouth 4 times daily for 10 days       Final diagnoses:   Constipation, unspecified constipation type   History of diverticulitis       9/17/2018   [facility]     Noble Elder MD  09/17/18 8197

## 2018-09-17 NOTE — ED AVS SNAPSHOT
[facility]    [address]    [place] [address]    Phone:  [phone]    Fax:  [phone]                                       Josué Álvarez   MRN: [id number]    Department:  [facility]   Date of Visit:  9/17/2018           Patient Information     Date Of Birth          1955        Your diagnoses for this visit were:     Constipation, unspecified constipation type     History of diverticulitis        You were seen by Noble Elder MD.        Discharge Instructions         Constipation (Adult)  Constipation means that you have bowel movements that are less frequent than usual. Stools often become very hard and difficult to pass.  Constipation is very common. At some point in life it affects almost everyone. Since everyone's bowel habits are different, what is constipation to one person may not be to another. Your healthcare provider may do tests to diagnose constipation. It depends on what he or she finds when evaluating you.    Symptoms of constipation include:    Abdominal pain    Bloating    Vomiting    Painful bowel movements    Itching, swelling, bleeding, or pain around the anus  Causes  Constipation can have many causes. These include:    Diet low in fiber    Too much dairy    Not drinking enough liquids    Lack of exercise or physical activity. This is especially true for older adults.    Changes in lifestyle or daily routine, including pregnancy, aging, work, and travel    Frequent use or misuse of laxatives    Ignoring the urge to have a bowel movement or delaying it until later    Medicines, such as certain prescription pain medicines, iron supplements, antacids, certain antidepressants, and calcium supplements    Diseases like irritable bowel syndrome, bowel obstructions, stroke, diabetes, thyroid disease, Parkinson disease, hemorrhoids, and colon cancer  Complications  Potential complications of constipation can  include:    Hemorrhoids    Rectal bleeding from hemorrhoids or anal fissures (skin tears)    Hernias    Dependency on laxatives    Chronic constipation    Fecal impaction    Bowel obstruction or perforation  Home care  All treatment should be done after talking with your healthcare provider. This is especially true if you have another medical problems, are taking prescription medicines, or are an older adult. Treatment most often involves lifestyle changes. You may also need medicines. Your healthcare provider will tell you which will work best for you. Follow the advice below to help avoid this problem in the future.  Lifestyle changes  These lifestyle changes can help prevent constipation:    Diet. Eat a high-fiber diet, with fresh fruit and vegetables, and reduce dairy intake, meats, and processed foods    Fluids. It's important to get enough fluids each day. Drink plenty of water when you eat more fiber. If you are on diet that limits the amount of fluid you can have, talk about this with your healthcare provider.    Regular exercise. Check with your healthcare provider first.  Medicines  Take any medicines as directed. Some laxatives are safe to use only every now and then. Others can be taken on a regular basis. Talk with your doctor or pharmacist if you have questions.  Prescription pain medicines can cause constipation. If you are taking this kind of medicine, ask your healthcare provider if you should also take a stool softener.  Medicines you may take to treat constipation include:    Fiber supplements    Stool softeners    Laxatives    Enemas    Rectal suppositories  Follow-up care  Follow up with your healthcare provider if symptoms don't get better in the next few days. You may need to have more tests or see a specialist.  Call 911  Call 911 if any of these occur:    Trouble breathing    Stiff, rigid abdomen that is severely painful to touch    Confusion    Fainting or loss of consciousness    Rapid  heart rate    Chest pain  When to seek medical advice  Call your healthcare provider right away if any of these occur:    Fever of 100.4 F (38 C) or higher, or as directed by your healthcare provider    Failure to resume normal bowel movements    Pain in your abdomen or back gets worse    Nausea or vomiting    Swelling in your abdomen    Blood in the stool    Black, tarry stool    Involuntary weight loss    Weakness  Date Last Reviewed: 12/30/2015 2000-2017 [employer]. [address] [place] [address], [place] [address]. All rights reserved. This information is not intended as a substitute for professional medical care. Always follow your healthcare professional's instructions.      If the milk of magnesia does not work, you could drink a bottle of magnesium citrate.  If this does not work by tomorrow afternoon you could drink 1 more bottle.  If a few hours after you have cleaned out, you are still having the pain, or if you develop fevers or worsening pain, you should start the antibiotics.    When you see your doctor in early October, mention that you had some blood in your urine so that he can recheck it.    24 Hour Appointment Hotline     To schedule an appointment at [facility], please call [phone]. If you don't have a family doctor or clinic, we will help you find one. [facility] clinics are conveniently located to serve the needs of you and your family.           Review of your medicines      START taking        Dose / Directions Last dose taken    ciprofloxacin 500 MG tablet   Commonly known as:  CIPRO   Dose:  500 mg   Quantity:  20 tablet        Take 1 tablet (500 mg) by mouth 2 times daily for 10 days   Refills:  0        metroNIDAZOLE 500 MG tablet   Commonly known as:  FLAGYL   Dose:  500 mg   Quantity:  40 tablet        Take 1 tablet (500 mg) by mouth 4 times daily for 10 days   Refills:  0          Our records show that you are taking the medicines listed below. If these are incorrect,  please call your family doctor or clinic.        Dose / Directions Last dose taken    ASPIRIN ADULT LOW STRENGTH PO   Dose:  81 mg        Take 81 mg by mouth daily   Refills:  0        atorvastatin 20 MG tablet   Commonly known as:  LIPITOR   Dose:  20 mg        Take 20 mg by mouth At Bedtime   Refills:  0        busPIRone 15 MG tablet   Commonly known as:  BUSPAR   Dose:  15 mg        Take 15 mg by mouth 2 times daily as needed   Refills:  0        fluticasone 50 MCG/ACT spray   Commonly known as:  FLONASE   Dose:  1 spray        Spray 1 spray in nostril daily   Refills:  0        ibuprofen 600 MG tablet   Commonly known as:  ADVIL/MOTRIN   Dose:  600 mg        Take 600 mg by mouth 4 times daily as needed   Refills:  0                Prescriptions were sent or printed at these locations (2 Prescriptions)                   [facility] [id number] [place] [facility] - [place], [place] - [address]   [address] [address]    Telephone:  [phone]   Fax:  [phone]   Hours:                  Printed at Department/Unit printer (2 of 2)         ciprofloxacin (CIPRO) 500 MG tablet               metroNIDAZOLE (FLAGYL) 500 MG tablet                Procedures and tests performed during your visit     *UA reflex to Microscopic    CBC with platelets differential    Comprehensive metabolic panel    Urine Microscopic    XR Abdomen 2 Views      Orders Needing Specimen Collection     None      Pending Results     Date and Time Order Name Status Description    9/17/2018 0111 XR Abdomen 2 Views In process             Pending Culture Results     No orders found from 9/15/2018 to 9/18/2018.            Pending Results Instructions     If you had any lab results that were not finalized at the time of your Discharge, you can call the ED Lab Result RN at [phone]. You will be contacted by this team for any positive Lab results or changes in treatment. The nurses are available 7 days a week from 10A to 6:30P.  You can  "leave a message 24 hours per day and they will return your call.        Thank you for choosing [facility]       Thank you for choosing [facility] for your care. Our goal is always to provide you with excellent care. Hearing back from our patients is one way we can continue to improve our services. Please take a few minutes to complete the written survey that you may receive in the mail after you visit with us. Thank you!        [employer] Information     [employer] lets you send messages to your doctor, view your test results, renew your prescriptions, schedule appointments and more. To sign up, go to [email or web address] . Click on \"Log in\" on the left side of the screen, which will take you to the Welcome page. Then click on \"Sign up Now\" on the right side of the page.     You will be asked to enter the access code listed below, as well as some personal information. Please follow the directions to create your username and password.     Your access code is: [email or web address]  Expires: 2018  2:30 AM     Your access code will  in 90 days. If you need help or a new code, please call your [facility] clinic or [phone].        Care EveryWhere ID     This is your Care EveryWhere ID. This could be used by other organizations to access your [facility] medical records  [id number]        Equal Access to Services     INDIANA CHEN : Sena Nascimento, waaxda luqadaha, qaybta kaalmada ana paula, bryanna johnson . So [facility] [phone].    ATENCIÓN: Si habla español, tiene a cooper disposición servicios gratuitos de asistencia lingüística. Llame al [phone].    We comply with applicable federal civil rights laws and [place] laws. We do not discriminate on the basis of race, color, national origin, age, disability, sex, sexual orientation, or gender identity.            After Visit Summary       This is your record. Keep this with you and show to your community pharmacist(s) and doctor(s) " at your next visit.

## 2018-09-17 NOTE — ED AVS SNAPSHOT
[facility]    [address]    [place] [address]    Phone:  [phone]    Fax:  [phone]                                       Josué Álvarez   MRN: [id number]    Department:  [facility]   Date of Visit:  9/17/2018           After Visit Summary Signature Page     I have received my discharge instructions, and my questions have been answered. I have discussed any challenges I see with this plan with the nurse or doctor.    ..........................................................................................................................................  Patient/Patient Representative Signature      ..........................................................................................................................................  Patient Representative Print Name and Relationship to Patient    ..................................................               ................................................  Date                                   Time    ..........................................................................................................................................  Reviewed by Signature/Title    ...................................................              ..............................................  Date                                               Time          22EPIC Rev 08/18

## 2018-09-17 NOTE — DISCHARGE INSTRUCTIONS
[facility] Medicine    [address] [facility] [address]    Phone:  [phone]       Thank You for choosing us for your health care visit. We are glad to serve you and happy to provide you with this summary of your visit. Please help us to ensure we have accurate records. If you find anything that needs to be changed, please let our staff know as soon as possible.          Your Demographic Information     Patient Name Sex Preston Galvez Male 1942       Ethnic Group Patient Race    Not of  or  Origin White      Your Visit Details     Date & Time Provider Department    3/10/2017 8:30 AM ALG IM COUMADIN CLINIC [facility] Internal Medicine      Your Upcoming Appointment*(Max 10)     2017  9:40 AM CDT   Follow-up Visit with January Carpio MD   [facility] ([facility])     [facility] [address]   [phone]            2017 10:00 AM CDT   Anti-Coagulation with ALG IM COUMADIN CLINIC   [facility] ([facility])    [address] [facility] [address]   [phone]            2017 11:00 AM CDT   Office Visit with Aisha Callaway DPM   [facility] ([facility])    [address] [place] [address] [place] [address]   [phone]            2017 10:00 AM CDT   Follow-up Visit with Cassidy Lomeli MD   [facility] ([facility])    [address] [facility] [id number] [phone]              We Ordered or Performed the Following     INR - POINT OF CARE       Conditions Discussed Today or Order-Related Diagnoses        Comments    Atrial fibrillation, unspecified type         Long term current use of anticoagulant    Constipation (Adult)  Constipation means that you have bowel movements that are less frequent than usual. Stools often become very hard and difficult to pass.  Constipation is very common. At some point in life it affects almost everyone. Since everyone's bowel habits are different, what is constipation to one person may not be to another. Your healthcare provider may do tests to diagnose constipation. It depends on what he or she finds when evaluating you.    Symptoms of constipation include:    Abdominal pain    Bloating    Vomiting    Painful bowel movements    Itching, swelling, bleeding, or pain around the anus  Causes  Constipation can have many causes. These include:    Diet low in fiber    Too much dairy    Not drinking enough liquids    Lack of exercise or physical activity. This is especially true for older adults.    Changes in lifestyle or daily routine, including pregnancy, aging, work, and travel    Frequent use or misuse of laxatives    Ignoring the urge to have a bowel movement or delaying it until later    Medicines, such as certain prescription pain medicines, iron supplements, antacids, certain antidepressants, and calcium supplements    Diseases like irritable bowel syndrome, bowel obstructions, stroke, diabetes, thyroid disease, Parkinson disease, hemorrhoids, and colon cancer  Complications  Potential complications of constipation can include:    Hemorrhoids    Rectal bleeding from hemorrhoids or anal fissures (skin tears)    Hernias    Dependency on laxatives    Chronic constipation    Fecal impaction    Bowel obstruction or perforation  Home care  All treatment should be done after talking with your healthcare provider. This is especially true if you have another medical problems, are taking prescription medicines, or are an older adult. Treatment most often involves lifestyle changes. You may also need medicines. Your healthcare provider will tell you which will work best for you. Follow  the advice below to help avoid this problem in the future.  Lifestyle changes  These lifestyle changes can help prevent constipation:    Diet. Eat a high-fiber diet, with fresh fruit and vegetables, and reduce dairy intake, meats, and processed foods    Fluids. It's important to get enough fluids each day. Drink plenty of water when you eat more fiber. If you are on diet that limits the amount of fluid you can have, talk about this with your healthcare provider.    Regular exercise. Check with your healthcare provider first.  Medicines  Take any medicines as directed. Some laxatives are safe to use only every now and then. Others can be taken on a regular basis. Talk with your doctor or pharmacist if you have questions.  Prescription pain medicines can cause constipation. If you are taking this kind of medicine, ask your healthcare provider if you should also take a stool softener.  Medicines you may take to treat constipation include:    Fiber supplements    Stool softeners    Laxatives    Enemas    Rectal suppositories  Follow-up care  Follow up with your healthcare provider if symptoms don't get better in the next few days. You may need to have more tests or see a specialist.  Call 911  Call 911 if any of these occur:    Trouble breathing    Stiff, rigid abdomen that is severely painful to touch    Confusion    Fainting or loss of consciousness    Rapid heart rate    Chest pain  When to seek medical advice  Call your healthcare provider right away if any of these occur:    Fever of 100.4 F (38 C) or higher, or as directed by your healthcare provider    Failure to resume normal bowel movements    Pain in your abdomen or back gets worse    Nausea or vomiting    Swelling in your abdomen    Blood in the stool    Black, tarry stool    Involuntary weight loss    Weakness  Date Last Reviewed: 12/30/2015 2000-2017 [employer]. [address] [place] [address], [place] [address]. All rights reserved. This  therapy         Encounter for therapeutic drug level monitoring           Your Vitals Were     Smoking Status                   Never Smoker           Medications Prescribed or Re-Ordered Today     None      Your Current Medications Are        Disp Refills Start End    albuterol (VENTOLIN HFA) 108 (90 BASE) MCG/ACT inhaler 1 Inhaler 0 3/1/2017     Sig - Route: Inhale 2 puffs into the lungs 4 times daily as needed (sob/wheezing). May substitute with any albuterol HFA covered by insurance. - Inhalation    Class: Eprescribe    atorvastatin (LIPITOR) 40 MG tablet 90 tablet 2 2/20/2017     Sig: TAKE 1 TABLET NIGHTLY    Class: Eprescribe    metFORMIN (GLUCOPHAGE) 500 MG tablet 270 tablet 1 1/30/2017     Sig: TAKE 1 TABLET THREE TIMES A DAY WITH MEALS    Class: Eprescribe    alendronate (FOSAMAX) 70 MG tablet 12 tablet 1 1/23/2017     Sig: TAKE 1 TABLET EVERY 7 DAYS    Class: Eprescribe    metoPROLOL (LOPRESSOR) 50 MG tablet 30 tablet 5 1/23/2017     Sig: TAKE 1 TABLET BY MOUTH EVERY MORNING.    Class: Eprescribe    metoPROLOL (LOPRESSOR) 50 MG tablet 90 tablet 1 12/2/2016     Sig - Route: Take 1 tablet by mouth every morning. - Oral    Class: Eprescribe    Elastic Bandages & Supports (MEDICAL COMPRESSION STOCKINGS) Misc 4 each 1 9/13/2016     Sig - Route: Apply 2 each topically daily. 1 pair medium knee high compression stockings.  Put on every morning, take off before bed. - Topical    Class: Eprescribe    Notes to Pharmacy: Please call patient and let him know hours when he can come in for measurement.    hydrochlorothiazide (MICROZIDE) 12.5 MG capsule 30 capsule 5 9/8/2016     Sig - Route: Take 1 capsule by mouth daily. - Oral    Class: Eprescribe    finasteride (PROSCAR) 5 MG tablet 90 tablet 2 8/19/2016     Sig: TAKE 1 TABLET DAILY    Class: Eprescribe    oxybutynin (DITROPAN-XL) 10 MG 24 hr tablet 90 tablet 3 8/19/2016     Sig - Route: Take 1 tablet by mouth daily. - Oral    Class: Eprescribe    citalopram (CELEXA)  20 MG tablet 90 tablet 3 8/19/2016     Sig - Route: Take 1 tablet by mouth daily. - Oral    Class: Eprescribe    diltiazem (CARTIA XT) 120 MG 24 hr capsule 90 capsule 3 8/19/2016     Sig - Route: Take 1 capsule by mouth daily. - Oral    Class: Eprescribe    metFORMIN (GLUCOPHAGE) 500 MG tablet 90 tablet 0 8/2/2016     Sig - Route: Take 1 tablet by mouth 3 times daily (with meals). - Oral    Class: Eprescribe    Notes to Pharmacy: This is a short term supply until mail order comes.    [employer]ign for Ordering: Accepted by Cassidy Lomeli MD on 8/2/2016 10:19 AM    tamsulosin (FLOMAX) 0.4 MG Cap 30 capsule 3 7/1/2016     Sig - Route: Take 1 capsule by mouth daily. - Oral    Class: Eprescribe    Notes to Pharmacy: Hasn't received mail order yet.    warfarin (COUMADIN) 5 MG tablet 120 tablet 1 4/20/2016     Sig: TAKE ONE AND ONE-HALF TABLETS ON MONDAY AND FRIDAY. TAKE 1 TABLET ALL REMAINING DAYS OF THE WEEK OR AS DIRECTED BY COUMADIN CLINIC.    Class: Eprescribe    fluticasone (FLONASE) 50 MCG/ACT nasal spray 16 g 12 10/13/2014     Sig - Route: Spray 1 spray in each nostril daily. - Nasal    Class: Eprescribe    cholecalciferol (VITAMIN D3) 1000 UNITS tablet        Sig - Route: Take 1,000 Units by mouth daily. - Oral    Class: Historical Med    fish oil-omega-3 fatty acids 1000 MG CAPS        Sig - Route: Take 1 capsule by mouth daily. - Oral    Class: Historical Med    ascorbic acid (VITAMIN C) 250 MG tablet        Sig - Route: Take 250 mg by mouth daily.   - Oral    Class: Historical Med    aspirin 81 MG tablet        Sig - Route: Take 81 mg by mouth daily.   - Oral    Class: Historical Med      Allergies     No Known Allergies      Immunizations History as of 3/10/2017     Name Date    HERPES ZOSTER SHINGLES 4/11/2014 12:13 PM    INFLUENZA QUADRIVALENT 10/30/2015, 10/13/2014    Influenza 9/19/2013, 9/27/2012, 9/1/2010, 8/31/2009, 10/15/2008, 10/19/2007, 10/21/2006, 11/18/2005, 12/14/2004, 10/6/2003, 11/13/2002, 10/9/1999  information is not intended as a substitute for professional medical care. Always follow your healthcare professional's instructions.      If the milk of magnesia does not work, you could drink a bottle of magnesium citrate.  If this does not work by tomorrow afternoon you could drink 1 more bottle.  If a few hours after you have cleaned out, you are still having the pain, or if you develop fevers or worsening pain, you should start the antibiotics.    When you see your doctor in early October, mention that you had some blood in your urine so that he can recheck it.      Influenza High Dose Pres Free 9/8/2016    Pneumococcal Conjugate 13 Valent 9/8/2016    Pneumococcal Polysaccharide Adult 3/11/2011, 9/29/2010, 3/11/2009    Td:Adult type tetanus/diphtheria 9/27/2010, 2/7/2001, 12/11/1998      Problem List as of 3/10/2017     Atrial fibrillation    Coronary atherosclerosis of native coronary artery    BPH (benign prostatic hypertrophy)    Depression    DM (diabetes mellitus)    Vitamin D deficiency    Hypercholesterolemia    PARI on CPAP - pt noncompliant with cpap    Hypertension    Long term (current) use of anticoagulants    Encounter for therapeutic drug level monitoring    Osteopenia    Atrial fibrillation, unspecified type      March 2017 Details    Sun Mon Tue Wed Thu Fri Sat        1               2               3               4                 5               6               7               8               9               10      7.5 mg   See details      11      5 mg           12      5 mg         13      5 mg         14      5 mg         15      5 mg         16      5 mg         17      7.5 mg         18      5 mg           19      5 mg         20      5 mg         21      5 mg         22      5 mg         23      5 mg         24      7.5 mg         25      5 mg           26      5 mg         27      5 mg         28      5 mg         29      5 mg         30      5 mg         31      7.5 mg           Date Details   03/10 This INR check               How to take your warfarin dose     To take:  5 mg Take 1 of the 5 mg tablets.    To take:  7.5 mg Take 1.5 of the 5 mg tablets.           April 2017 Details    Sun Mon Tue Wed Thu Fri Sat           1      5 mg           2      5 mg         3      5 mg         4      5 mg         5      5 mg         6      5 mg         7      7.5 mg         8                 9               10               11               12               13               14               15                 16               17               18                19               20               21               22                 23               24               25               26               27               28               29                 30                      Date Details   No additional details    Date of next INR:  4/7/2017         How to take your warfarin dose     To take:  5 mg Take 1 of the 5 mg tablets.    To take:  7.5 mg Take 1.5 of the 5 mg tablets.           Patient Findings     Positives Change in medications (albuterol inhaler, does not affect INR.)    Negatives Signs/symptoms of thrombosis, Signs/symptoms of bleeding, Laboratory test error suspected, Change in health, Change in alcohol use, Change in activity, Upcoming invasive procedure, Emergency department visit, Upcoming dental procedure, Missed doses, Extra doses, Change in diet/appetite, Hospital admission, Bruising, Other complaints      Results of Testing Done Today     INR - POINT OF CARE      Component Value Standard Range & Units    INR-POC 2.9 2.0 - 3.0                          Patient Instructions    Call your healthcare provider right away if you get any of the following signs or symptoms of bleeding problems:   * Pain, color, or temperature changes to any part of your body   * Headaches, dizziness or weakness   * Unusual bruising (unexplained or growing in size)   * Nosebleeds   * Bleeding gums   * Bleeding from cuts that take a long time to stop   * Menstrual bleeding or vaginal bleeding that is heavier than normal   * Pink or brown urine   * Red or black stools   * Coughing up blood   * Vomiting blood or material that looks like coffee grounds    Update Anticoagulation Clinic (Coumadin Clinic) with any of the following:   * Medication changes (new, stopped or dose changes, this includes over-the-counter medications and supplements)   * Planning on having any surgery, medical or dental procedures   * Diet changes   * Falls  (you should go to Emergency Department immediately for  evaluation, then notify Anticoagulation Clinic)    Please, do not wait until your next appointment to update us on changes.

## 2018-09-18 ENCOUNTER — APPOINTMENT (OUTPATIENT)
Dept: CT IMAGING | Facility: OTHER | Age: 63
DRG: 392 | End: 2018-09-18
Attending: EMERGENCY MEDICINE
Payer: COMMERCIAL

## 2018-09-18 ENCOUNTER — HOSPITAL ENCOUNTER (INPATIENT)
Facility: OTHER | Age: 63
LOS: 2 days | Discharge: HOME | DRG: 392 | End: 2018-09-20
Attending: EMERGENCY MEDICINE | Admitting: INTERNAL MEDICINE
Payer: COMMERCIAL

## 2018-09-18 DIAGNOSIS — K57.92 ACUTE DIVERTICULITIS: ICD-10-CM

## 2018-09-18 DIAGNOSIS — Z87.891 PERSONAL HISTORY OF TOBACCO USE, PRESENTING HAZARDS TO HEALTH: ICD-10-CM

## 2018-09-18 PROBLEM — K57.20 DIVERTICULITIS OF COLON WITH PERFORATION: Status: ACTIVE | Noted: 2018-09-18

## 2018-09-18 PROBLEM — Z86.0101 H/O ADENOMATOUS POLYP OF COLON: Status: ACTIVE | Noted: 2018-09-18

## 2018-09-18 PROBLEM — K57.20 DIVERTICULITIS OF LARGE INTESTINE WITH PERFORATION WITHOUT BLEEDING: Status: ACTIVE | Noted: 2018-09-18

## 2018-09-18 LAB
ALBUMIN SERPL-MCNC: 4.6 G/DL (ref 3.5–5.7)
ALP SERPL-CCNC: 58 U/L (ref 34–104)
ALT SERPL W P-5'-P-CCNC: 23 U/L (ref 7–52)
ANION GAP SERPL CALCULATED.3IONS-SCNC: 14 MMOL/L (ref 3–14)
AST SERPL W P-5'-P-CCNC: 17 U/L (ref 13–39)
BASOPHILS # BLD AUTO: 0.1 10E9/L (ref 0–0.2)
BASOPHILS NFR BLD AUTO: 0.3 %
BILIRUB SERPL-MCNC: 1.5 MG/DL (ref 0.3–1)
BUN SERPL-MCNC: 15 MG/DL (ref 7–25)
CALCIUM SERPL-MCNC: 10 MG/DL (ref 8.6–10.3)
CHLORIDE SERPL-SCNC: 98 MMOL/L (ref 98–107)
CO2 SERPL-SCNC: 23 MMOL/L (ref 21–31)
CREAT SERPL-MCNC: 0.99 MG/DL (ref 0.7–1.3)
DIFFERENTIAL METHOD BLD: ABNORMAL
EOSINOPHIL # BLD AUTO: 0 10E9/L (ref 0–0.7)
EOSINOPHIL NFR BLD AUTO: 0.1 %
ERYTHROCYTE [DISTWIDTH] IN BLOOD BY AUTOMATED COUNT: 12.2 % (ref 10–15)
GFR SERPL CREATININE-BSD FRML MDRD: 77 ML/MIN/1.7M2
GLUCOSE SERPL-MCNC: 100 MG/DL (ref 70–105)
HCT VFR BLD AUTO: 42.7 % (ref 40–53)
HGB BLD-MCNC: 15.3 G/DL (ref 13.3–17.7)
IMM GRANULOCYTES # BLD: 0.1 10E9/L (ref 0–0.4)
IMM GRANULOCYTES NFR BLD: 0.4 %
LACTATE SERPL-SCNC: 1 MMOL/L (ref 0.5–2.2)
LYMPHOCYTES # BLD AUTO: 1.5 10E9/L (ref 0.8–5.3)
LYMPHOCYTES NFR BLD AUTO: 8.1 %
MCH RBC QN AUTO: 33.5 PG (ref 26.5–33)
MCHC RBC AUTO-ENTMCNC: 35.8 G/DL (ref 31.5–36.5)
MCV RBC AUTO: 93 FL (ref 78–100)
MONOCYTES # BLD AUTO: 1.3 10E9/L (ref 0–1.3)
MONOCYTES NFR BLD AUTO: 6.8 %
NEUTROPHILS # BLD AUTO: 15.7 10E9/L (ref 1.6–8.3)
NEUTROPHILS NFR BLD AUTO: 84.3 %
PLATELET # BLD AUTO: 205 10E9/L (ref 150–450)
POTASSIUM SERPL-SCNC: 3.9 MMOL/L (ref 3.5–5.1)
PROT SERPL-MCNC: 8 G/DL (ref 6.4–8.9)
RBC # BLD AUTO: 4.57 10E12/L (ref 4.4–5.9)
SODIUM SERPL-SCNC: 135 MMOL/L (ref 134–144)
WBC # BLD AUTO: 18.6 10E9/L (ref 4–11)

## 2018-09-18 PROCEDURE — 25000128 H RX IP 250 OP 636: Performed by: EMERGENCY MEDICINE

## 2018-09-18 PROCEDURE — 99223 1ST HOSP IP/OBS HIGH 75: CPT | Mod: 25 | Performed by: SURGERY

## 2018-09-18 PROCEDURE — 96365 THER/PROPH/DIAG IV INF INIT: CPT | Performed by: EMERGENCY MEDICINE

## 2018-09-18 PROCEDURE — 80053 COMPREHEN METABOLIC PANEL: CPT | Performed by: EMERGENCY MEDICINE

## 2018-09-18 PROCEDURE — 96375 TX/PRO/DX INJ NEW DRUG ADDON: CPT | Performed by: EMERGENCY MEDICINE

## 2018-09-18 PROCEDURE — 74177 CT ABD & PELVIS W/CONTRAST: CPT

## 2018-09-18 PROCEDURE — 85025 COMPLETE CBC W/AUTO DIFF WBC: CPT | Performed by: EMERGENCY MEDICINE

## 2018-09-18 PROCEDURE — 25000128 H RX IP 250 OP 636: Performed by: INTERNAL MEDICINE

## 2018-09-18 PROCEDURE — 25500064 ZZH RX 255 OP 636: Performed by: EMERGENCY MEDICINE

## 2018-09-18 PROCEDURE — 25000125 ZZHC RX 250: Performed by: INTERNAL MEDICINE

## 2018-09-18 PROCEDURE — 12000000 ZZH R&B MED SURG/OB

## 2018-09-18 PROCEDURE — 93005 ELECTROCARDIOGRAM TRACING: CPT

## 2018-09-18 PROCEDURE — 83605 ASSAY OF LACTIC ACID: CPT | Performed by: EMERGENCY MEDICINE

## 2018-09-18 PROCEDURE — 36415 COLL VENOUS BLD VENIPUNCTURE: CPT | Performed by: EMERGENCY MEDICINE

## 2018-09-18 PROCEDURE — 99285 EMERGENCY DEPT VISIT HI MDM: CPT | Mod: Z6 | Performed by: EMERGENCY MEDICINE

## 2018-09-18 PROCEDURE — 99285 EMERGENCY DEPT VISIT HI MDM: CPT | Mod: 25 | Performed by: EMERGENCY MEDICINE

## 2018-09-18 PROCEDURE — 99222 1ST HOSP IP/OBS MODERATE 55: CPT | Mod: AI | Performed by: INTERNAL MEDICINE

## 2018-09-18 PROCEDURE — 87040 BLOOD CULTURE FOR BACTERIA: CPT | Mod: 91 | Performed by: EMERGENCY MEDICINE

## 2018-09-18 PROCEDURE — 96376 TX/PRO/DX INJ SAME DRUG ADON: CPT | Performed by: EMERGENCY MEDICINE

## 2018-09-18 PROCEDURE — 25000132 ZZH RX MED GY IP 250 OP 250 PS 637: Performed by: INTERNAL MEDICINE

## 2018-09-18 PROCEDURE — 87040 BLOOD CULTURE FOR BACTERIA: CPT | Performed by: EMERGENCY MEDICINE

## 2018-09-18 PROCEDURE — 93010 ELECTROCARDIOGRAM REPORT: CPT | Performed by: INTERNAL MEDICINE

## 2018-09-18 PROCEDURE — 40000275 ZZH STATISTIC RCP TIME EA 10 MIN

## 2018-09-18 RX ORDER — PROCHLORPERAZINE MALEATE 10 MG
10 TABLET ORAL EVERY 6 HOURS PRN
Status: DISCONTINUED | OUTPATIENT
Start: 2018-09-18 | End: 2018-09-20 | Stop reason: HOSPADM

## 2018-09-18 RX ORDER — POTASSIUM CHLORIDE 7.45 MG/ML
10 INJECTION INTRAVENOUS
Status: DISCONTINUED | OUTPATIENT
Start: 2018-09-18 | End: 2018-09-20 | Stop reason: HOSPADM

## 2018-09-18 RX ORDER — MAGNESIUM SULFATE HEPTAHYDRATE 40 MG/ML
4 INJECTION, SOLUTION INTRAVENOUS EVERY 4 HOURS PRN
Status: DISCONTINUED | OUTPATIENT
Start: 2018-09-18 | End: 2018-09-20 | Stop reason: HOSPADM

## 2018-09-18 RX ORDER — FENTANYL CITRATE 50 UG/ML
50 INJECTION, SOLUTION INTRAMUSCULAR; INTRAVENOUS ONCE
Status: COMPLETED | OUTPATIENT
Start: 2018-09-18 | End: 2018-09-18

## 2018-09-18 RX ORDER — PROCHLORPERAZINE 25 MG
25 SUPPOSITORY, RECTAL RECTAL EVERY 12 HOURS PRN
Status: DISCONTINUED | OUTPATIENT
Start: 2018-09-18 | End: 2018-09-20 | Stop reason: HOSPADM

## 2018-09-18 RX ORDER — FENTANYL CITRATE 50 UG/ML
50 INJECTION, SOLUTION INTRAMUSCULAR; INTRAVENOUS
Status: COMPLETED | OUTPATIENT
Start: 2018-09-18 | End: 2018-09-18

## 2018-09-18 RX ORDER — MORPHINE SULFATE 2 MG/ML
2-4 INJECTION, SOLUTION INTRAMUSCULAR; INTRAVENOUS
Status: DISCONTINUED | OUTPATIENT
Start: 2018-09-18 | End: 2018-09-20 | Stop reason: HOSPADM

## 2018-09-18 RX ORDER — KETOROLAC TROMETHAMINE 30 MG/ML
30 INJECTION, SOLUTION INTRAMUSCULAR; INTRAVENOUS EVERY 6 HOURS PRN
Status: DISCONTINUED | OUTPATIENT
Start: 2018-09-18 | End: 2018-09-20 | Stop reason: HOSPADM

## 2018-09-18 RX ORDER — ACETAMINOPHEN 325 MG/1
650 TABLET ORAL EVERY 4 HOURS PRN
Status: DISCONTINUED | OUTPATIENT
Start: 2018-09-18 | End: 2018-09-20 | Stop reason: HOSPADM

## 2018-09-18 RX ORDER — LIDOCAINE 40 MG/G
CREAM TOPICAL
Status: DISCONTINUED | OUTPATIENT
Start: 2018-09-18 | End: 2018-09-20 | Stop reason: HOSPADM

## 2018-09-18 RX ORDER — SODIUM CHLORIDE 9 MG/ML
INJECTION, SOLUTION INTRAVENOUS CONTINUOUS
Status: DISCONTINUED | OUTPATIENT
Start: 2018-09-18 | End: 2018-09-18

## 2018-09-18 RX ORDER — NICOTINE 21 MG/24HR
1 PATCH, TRANSDERMAL 24 HOURS TRANSDERMAL DAILY PRN
Status: DISCONTINUED | OUTPATIENT
Start: 2018-09-18 | End: 2018-09-20 | Stop reason: HOSPADM

## 2018-09-18 RX ORDER — ONDANSETRON 4 MG/1
4 TABLET, ORALLY DISINTEGRATING ORAL EVERY 6 HOURS PRN
Status: DISCONTINUED | OUTPATIENT
Start: 2018-09-18 | End: 2018-09-20 | Stop reason: HOSPADM

## 2018-09-18 RX ORDER — NALOXONE HYDROCHLORIDE 0.4 MG/ML
.1-.4 INJECTION, SOLUTION INTRAMUSCULAR; INTRAVENOUS; SUBCUTANEOUS
Status: DISCONTINUED | OUTPATIENT
Start: 2018-09-18 | End: 2018-09-20 | Stop reason: HOSPADM

## 2018-09-18 RX ORDER — SODIUM CHLORIDE 9 MG/ML
INJECTION, SOLUTION INTRAVENOUS CONTINUOUS
Status: DISCONTINUED | OUTPATIENT
Start: 2018-09-18 | End: 2018-09-20

## 2018-09-18 RX ORDER — POTASSIUM CHLORIDE 1500 MG/1
20-40 TABLET, EXTENDED RELEASE ORAL
Status: DISCONTINUED | OUTPATIENT
Start: 2018-09-18 | End: 2018-09-20 | Stop reason: HOSPADM

## 2018-09-18 RX ORDER — ONDANSETRON 2 MG/ML
4 INJECTION INTRAMUSCULAR; INTRAVENOUS EVERY 6 HOURS PRN
Status: DISCONTINUED | OUTPATIENT
Start: 2018-09-18 | End: 2018-09-20 | Stop reason: HOSPADM

## 2018-09-18 RX ADMIN — FENTANYL CITRATE 50 MCG: 50 INJECTION INTRAMUSCULAR; INTRAVENOUS at 14:59

## 2018-09-18 RX ADMIN — TAZOBACTAM SODIUM AND PIPERACILLIN SODIUM 4.5 G: 500; 4 INJECTION, SOLUTION INTRAVENOUS at 22:08

## 2018-09-18 RX ADMIN — TAZOBACTAM SODIUM AND PIPERACILLIN SODIUM 4.5 G: 500; 4 INJECTION, SOLUTION INTRAVENOUS at 14:30

## 2018-09-18 RX ADMIN — SODIUM CHLORIDE: 900 INJECTION, SOLUTION INTRAVENOUS at 13:20

## 2018-09-18 RX ADMIN — ACETAMINOPHEN 650 MG: 325 TABLET, FILM COATED ORAL at 19:50

## 2018-09-18 RX ADMIN — FAMOTIDINE 20 MG: 20 INJECTION, SOLUTION INTRAVENOUS at 16:30

## 2018-09-18 RX ADMIN — FENTANYL CITRATE 50 MCG: 50 INJECTION INTRAMUSCULAR; INTRAVENOUS at 13:20

## 2018-09-18 RX ADMIN — IOHEXOL 100 ML: 350 INJECTION, SOLUTION INTRAVENOUS at 13:46

## 2018-09-18 RX ADMIN — KETOROLAC TROMETHAMINE 30 MG: 30 INJECTION, SOLUTION INTRAMUSCULAR at 17:01

## 2018-09-18 RX ADMIN — SODIUM CHLORIDE: 900 INJECTION, SOLUTION INTRAVENOUS at 22:07

## 2018-09-18 ASSESSMENT — ENCOUNTER SYMPTOMS
NAUSEA: 0
DIARRHEA: 0
FEVER: 0
AGITATION: 0
LIGHT-HEADEDNESS: 0
CHEST TIGHTNESS: 0
VOMITING: 0
DYSURIA: 0
CHILLS: 0
SHORTNESS OF BREATH: 0
ABDOMINAL PAIN: 1
ARTHRALGIAS: 0

## 2018-09-18 ASSESSMENT — ACTIVITIES OF DAILY LIVING (ADL)
ADLS_ACUITY_SCORE: 12
ADLS_ACUITY_SCORE: 11

## 2018-09-18 NOTE — H&P
[facility]    History and Physical  Hospitalist       Date of Admission:  9/18/2018    Assessment & Plan   Josué Álvarez is a 63 year old male who presents with diverticulitis with perforation without bleeding.    Principal Problem:    Diverticulitis of large intestine with perforation without bleeding    Assessment: Febrile but hemodynamically stable without evidence clear peritonitis or an acute abdomen.  Will benefit from IV antibiotics with close monitoring bowel rest and a surgical consult.  Third episode within the past 2 years.    Plan: - IV Zosyn day 1   - appreciate general surgery consult   - clears   - as needed pain control   - trend white blood cell count    Active Problems:    History of tobacco abuse    Assessment: No cigarettes over 4 years    Plan: - nicotine patch as needed   - obtain ekg    FEN: clear liquid diet, normal saline at 125 mL/hr, mg/k replacement protocol  PPX: SCD's     Code Status: No Order    Brenton Sen    Primary Care Physician   Physician No Ref-Primary    Chief Complaint   Abdominal pain    History is obtained from the patient and chart review.    History of Present Illness   Josué Álvarez is a 63 year old male who presents with diverticulitis with localized perforation.  Patient previously had a colonoscopy April 2018 with polypectomies and significant diverticulosis within the left colon.  Last episode of diverticulitis was little over a year ago.  He developed left lower quadrant abdominal pain 3 days ago which felt like prior diver radiculitis.  Presented to the emergency department yesterday, had significant stool in his abdominal x-ray, minimal white blood cell count and no fever, was sent home with a prescription for Cipro and Flagyl to start if after moving his bowels he continued to have pain.  The pain suddenly worsened and he returned back to the emergency department.    In the ER today he underwent a CT of abdomen and pelvis was noted to have  diverticulitis with localized perforation, started on IV Zosyn and subsequently admitted for further management.    Past Medical History    I have reviewed this patient's medical history and updated it with pertinent information if needed.   Past Medical History:   Diagnosis Date     Abdominal aortic atherosclerosis (H)      Cyst of right kidney      Diverticulitis of intestine without perforation or abscess without bleeding     No Comments Provided     History of prostate biopsy      History of tobacco abuse      Left inguinal hernia      LLQ abdominal pain        Past Surgical History   I have reviewed this patient's surgical history and updated it with pertinent information if needed.  Past Surgical History:   Procedure Laterality Date     COLONOSCOPY N/A 4/6/2018    F/U Colonoscopy 2023;  Surgeon: Morgan Perdue MD;  Location: [facility] OR     HX SHOULDER  Left     Left shoulder operation       Prior to Admission Medications   Prior to Admission Medications   Prescriptions Last Dose Informant Patient Reported? Taking?   ASPIRIN ADULT LOW STRENGTH PO Past Week at Unknown time  Yes Yes   Sig: Take 81 mg by mouth daily   atorvastatin (LIPITOR) 20 MG tablet Past Week at Unknown time  Yes Yes   Sig: Take 20 mg by mouth At Bedtime   busPIRone (BUSPAR) 15 MG tablet 9/17/2018 at Unknown time  Yes Yes   Sig: Take 15 mg by mouth 2 times daily as needed   ciprofloxacin (CIPRO) 500 MG tablet 9/18/2018 at am  No Yes   Sig: Take 1 tablet (500 mg) by mouth 2 times daily for 10 days   fluticasone (FLONASE) 50 MCG/ACT spray Past Week at Unknown time  Yes Yes   Sig: Spray 1 spray into both nostrils daily    metroNIDAZOLE (FLAGYL) 500 MG tablet 9/18/2018 at 1030  No Yes   Sig: Take 1 tablet (500 mg) by mouth 4 times daily for 10 days      Facility-Administered Medications: None     Allergies   No Known Allergies    Social History   I have reviewed this patient's social history and updated it with pertinent information if needed. Josué  MCKAY Álvarez  reports that he quit smoking about 3 years ago. He has never used smokeless tobacco. He reports that he drinks alcohol. He reports that he does not use illicit drugs.    Family History   I have reviewed this patient's family history and updated it with pertinent information if needed.   Family History   Problem Relation Age of Onset     No Known Problems Other        Review of Systems     REVIEW OF SYSTEMS:    Constitutional: Poor energy and appetite, febrile at home, no recent sick contacts.   Ears, nose, mouth, throat, and face: no mouth sores, dysphagia, or odynophagia  Respiratory: no shortness of breath, cough, or wheezing. No aspiration symptoms.   Cardiovascular: no chest pain, palpitations, orthopnea, increased lower extremity edema, or syncope.   Gastrointestinal: Abdominal pain located in left lower quadrant, nonradiating, resolved after IV fentanyl.  Genitourinary: no dysuria, hematuria, urgency or frequency.   Hematologic/lymphatic: no unintentional weight loss or night sweats.  Musculoskeletal: no pain to extremities or falls.   Neurological: no new weakness, tingling, numbness.   Endocrine: not a known diabetic.     Physical Exam   Temp: 100.6  F (38.1  C) Temp src: Tympanic BP: 129/90   Heart Rate: 103 Resp: 18 SpO2: 96 % O2 Device: None (Room air)    Vital Signs with Ranges  Temp:  [100.6  F (38.1  C)-101.5  F (38.6  C)] 100.6  F (38.1  C)  Heart Rate:  [103-109] 103  Resp:  [18] 18  BP: (113-139)/() 129/90  SpO2:  [92 %-97 %] 96 %  145 lbs 0 oz    Exam:  GENERAL: Talkative, in no apparent distress.  Head: normocephalic and atraumatic  Eyes: anicteric and non-injected sclera  Nose: no rhinorrhea or epistaxis.   Throat: moist mucous membranes with no active oral lesions.  NECK: Supple, jugular venous distension not present.  CARDIOVASCULAR: regular rate and rhythm, no murmurs, rubs, or gallops. Normal S1/S2. No lower extremity edema.   RESPIRATORY: clear to auscultation bilaterally, no  wheezes, no crackles.    GI: soft, non-distended, mild tenderness with deep palpation of the left lower quadrant, no peritoneal signs of rebound or guarding.   MUSCULOSKELETAL: warm and well perfused, 2+ dorsalis pedis pulses.    SKIN: no pallor, jaundice or rashes.  NEUROLOGY: AAOx3, follows commands, speech and language without focal deficits.     Data   Data reviewed today:  I personally reviewed the abdominal CT image(s) showing Microperforation with diverticulitis.    Recent Labs  Lab 09/18/18  1305 09/17/18  0115   WBC 18.6* 12.4*   HGB 15.3 14.5   MCV 93 92    210    138   POTASSIUM 3.9 4.1   CHLORIDE 98 102   CO2 23 26   BUN 15 24   CR 0.99 1.08   ANIONGAP 14 10   KAREN 10.0 10.0    99   ALBUMIN 4.6 4.3   PROTTOTAL 8.0 7.0   BILITOTAL 1.5* 0.4   ALKPHOS 58 59   ALT 23 26   AST 17 17       Recent Results (from the past 24 hour(s))   CT Abdomen Pelvis w Contrast    Narrative    PROCEDURE:  CT ABDOMEN PELVIS W CONTRAST    HISTORY: Worsening diverticulitis;     TECHNIQUE:  Helical CT of the abdomen and pelvis was performed  following injection of intravenous contrast.  Ingested oral contrast  partially opacifies the bowel.      COMPARISON:  8/14/2017, 10/30/2016    MEDS/CONTRAST: 100 ml Omnipaque 350    FINDINGS:      Limited images through the lung bases demonstrate no focal  consolidation or mass.  The heart size is normal. No pericardial or  pleural effusions are seen.    Sigmoid diverticulitis is redemonstrated, now with a small amount of  free air in the adjacent mesentery as well as extensive adjacent fat  stranding and trace fluid. The perforated diverticulum is near but  slightly proximal to the previously inflamed diverticulum seen on  8/14/2017 and distal to the inflamed diverticulum seen on 10/30/2016.  No abscess formation or remote free air is seen. No upstream bowel  obstruction is seen.    The liver demonstrates no mass or intrahepatic biliary ductal  dilatation.  The  gallbladder, spleen, pancreas and adrenal glands are  unremarkable.  Symmetric nephrograms are present without evidence of  hydronephrosis. An exophytic cyst arising from the right kidney again  has some layering dense debris. A nonobstructive right renal calculus  is present. There is no abdominal aortic aneurysm. The prostate is  enlarged.    No suspicious osseous lesions are identified.      Impression    IMPRESSION:      This is the third episode of acute diverticulitis within the past 2  years, today associated with localized free air in the mesentery. No  abscess formation is seen.    GOLDIE ORONA MD

## 2018-09-18 NOTE — IP AVS SNAPSHOT
[facility]    [address]    [place] [address]    Phone:  [phone]    Fax:  [phone]                                       After Visit Summary   9/18/2018    Josué Álvarez    MRN: [id number]           After Visit Summary Signature Page     I have received my discharge instructions, and my questions have been answered. I have discussed any challenges I see with this plan with the nurse or doctor.    ..........................................................................................................................................  Patient/Patient Representative Signature      ..........................................................................................................................................  Patient Representative Print Name and Relationship to Patient    ..................................................               ................................................  Date                                   Time    ..........................................................................................................................................  Reviewed by Signature/Title    ...................................................              ..............................................  Date                                               Time          22EPIC Rev 08/18

## 2018-09-18 NOTE — ED TRIAGE NOTES
Pt admit to [place] [facility], ambulatory.  Pt reports hx of diverticulitis, had constipation on Sunday and was seen that day, x-ray was done, he's been taking abx since yesterday.  Pt has lower abdominal pain, worsens to touch, also has a fever & nausea, no emesis, last BM was this AM but it was a small amount, diarrhea (clear, soft).

## 2018-09-18 NOTE — CONSULTS
Surgical Clinic Consult  Referring physician:  GREGORIO Elder  Primary physician:     No Ref-Primary, Physician    Chief complaint:   Acute diverticulitis    History of present illness:  This is a 63 year old male I am seeing in consultation for acute diverticulitis.  This is the patient's third episode of diverticulitis in the last 3 years.  He was seen in the emergency room for left lower quadrant pain and was given a laxative which had results.  He developed left lower quadrant pain that worsened yesterday and was associated with fevers.  CT scan shows sigmoid diverticulitis with localized perforation, no free air or free fluid.  Patient's last colonoscopy was in April 2018 and he had sigmoid and left-sided diverticulosis and a tubular adenoma with a recommendation for colonoscopy in 5 years.     Past medical history:   Past Medical History:   Diagnosis Date     Abdominal aortic atherosclerosis (H)      Cyst of right kidney      Diverticulitis of intestine without perforation or abscess without bleeding     No Comments Provided     History of prostate biopsy      History of tobacco abuse      Left inguinal hernia      LLQ abdominal pain        Pastsurgical history:  Past Surgical History:   Procedure Laterality Date     COLONOSCOPY N/A 4/6/2018    F/U Colonoscopy 2023;  Surgeon: Morgan Perdue MD;  Location: [facility] OR     HX SHOULDER  Left     Left shoulder operation       Current medications:  Current Outpatient Prescriptions   Medication Sig Dispense Refill     ASPIRIN ADULT LOW STRENGTH PO Take 81 mg by mouth daily       atorvastatin (LIPITOR) 20 MG tablet Take 20 mg by mouth At Bedtime       busPIRone (BUSPAR) 15 MG tablet Take 15 mg by mouth 2 times daily as needed       ciprofloxacin (CIPRO) 500 MG tablet Take 1 tablet (500 mg) by mouth 2 times daily for 10 days 20 tablet 0     fluticasone (FLONASE) 50 MCG/ACT spray Spray 1 spray into both nostrils daily        metroNIDAZOLE (FLAGYL) 500 MG tablet Take 1 tablet (500  "mg) by mouth 4 times daily for 10 days 40 tablet 0       Allergies:  No Known Allergies    Family history:  No family history on file.    Social history:  Social History     Social History     Marital status: Single     Spouse name: N/A     Number of children: N/A     Years of education: N/A     Occupational History     Not on file.     Social History Main Topics     Smoking status: Former Smoker     Quit date: 10/30/2014     Smokeless tobacco: Never Used     Alcohol use Yes      Comment: occasional     Drug use: No      Comment: Drug use: No     Sexual activity: Not on file     Other Topics Concern     Not on file     Social History Narrative       PROBLEM LIST:  Patient Active Problem List   Diagnosis     Abdominal aortic atherosclerosis (H)     Acute diverticulitis     Cyst of right kidney     History of prostate biopsy     History of tobacco abuse     Alcohol dependence (H)     Anxiety     Hyperlipidemia     Prostate cancer (H)     H/O adenomatous polyp of colon     Diverticulitis of large intestine with perforation without bleeding     Review of systems:  COMPLETE REVIEW OF SYSTEMS: Fevers  Gastrointestinal: See HPI  Cardiovascular: Denies problems  Respiratory: Denies problems  Genitourinary: History of prostate cancer being monitored in the [facility] no surgical intervention  Musculoskeletal: Denies problems  Skin: Denies problems  Neurologic: Denies problems  Psychiatric: Denies problems  Endocrine: Denies problems  Heme/Lymphatic: Denies problems  Vascular: Denies problems      Physical exam: BP (!) 133/91  Temp 101.5  F (38.6  C) (Tympanic)  Resp 18  Ht 1.778 m (5' 10\")  Wt 65.8 kg (145 lb)  SpO2 96%  BMI 20.81 kg/m2      General: this is a pleasant male patient in no acute distress.  Patient is awake alert and oriented x3 .     Abdominal: No surgical scars.  No bulges in either inguinal area.  Tender left lower quadrant to palpation.    Assessment:   Acute diverticulitis in sigmoid colon with localized " perforation and no abscess.  This is his third episode.  Last episode was about a year ago.  He was given literature on diverticulosis/diverticulitis and we discussed the overall treatment and indications for surgical intervention.    Plan:    IV antibiotics and bowel rest  Once he recuperates emphasized the need for 25-30 g of fiber orally daily.  Will follow      Koko Royal MD FACS

## 2018-09-18 NOTE — ED PROVIDER NOTES
History     Chief Complaint   Patient presents with     Abdominal Pain     Patient is a 63 year old male presenting with abdominal pain.   Abdominal Pain   Associated symptoms: no chest pain, no chills, no diarrhea, no dysuria, no fever, no nausea, no shortness of breath and no vomiting      Josué Álvarez is a 63 year old male who is here with abdominal pain.  He was seen here 2 days ago.  He appeared to be constipated.  He did have some tenderness to palpation over the suprapubic area as well at that time.  He had a mildly elevated white blood count and we discussed the possibility of diverticulitis, which she has had before.  His exam was unremarkable at that time, and we decided to try to have him take some laxative and cleanout and see how he felt.  He went home and took some magnesium citrate and he did feel better for a while.  Then the pain started to come back again.  Pain is no worse than it was previously and he also has developed a fever.  Pain is now suprapubic and left lower quadrant.  I did send him home with prescription for both Cipro and Flagyl in case he was not getting better.  He started these yesterday and has had about 24 hours of the antibiotic.    Problem List:    Patient Active Problem List    Diagnosis Date Noted     Acute diverticulitis 08/15/2017     Priority: Medium     Abdominal aortic atherosclerosis (H) 10/30/2016     Priority: Medium     Acute diverticulitis of intestine 10/30/2016     Priority: Medium     Continuous LLQ abdominal pain 10/30/2016     Priority: Medium     Cyst of right kidney 10/30/2016     Priority: Medium     Diarrhea 10/30/2016     Priority: Medium     History of prostate biopsy 10/30/2016     Priority: Medium     History of tobacco abuse 10/30/2016     Priority: Medium     Left inguinal hernia 10/30/2016     Priority: Medium        Past Medical History:    Past Medical History:   Diagnosis Date     Abdominal aortic atherosclerosis (H)      Cyst of right kidney   "    Diverticulitis of intestine without perforation or abscess without bleeding      History of prostate biopsy      History of tobacco abuse      Left inguinal hernia      LLQ abdominal pain        Past Surgical History:    Past Surgical History:   Procedure Laterality Date     COLONOSCOPY       COLONOSCOPY N/A 4/6/2018    Procedure: COMBINED COLONOSCOPY, SINGLE OR MULTIPLE BIOPSY/POLYPECTOMY BY BIOPSY;  Colonoscopy;  Surgeon: Morgan Perdue MD;  Location: [facility] OR       Family History:    No family history on file.    Social History:  Marital Status:  Single [1]  Social History   Substance Use Topics     Smoking status: Former Smoker     Quit date: 10/30/2014     Smokeless tobacco: Never Used     Alcohol use Yes      Comment: occasional        Medications:      ASPIRIN ADULT LOW STRENGTH PO   atorvastatin (LIPITOR) 20 MG tablet   busPIRone (BUSPAR) 15 MG tablet   ciprofloxacin (CIPRO) 500 MG tablet   fluticasone (FLONASE) 50 MCG/ACT spray   ibuprofen (ADVIL/MOTRIN) 600 MG tablet   metroNIDAZOLE (FLAGYL) 500 MG tablet         Review of Systems   Constitutional: Negative for chills and fever.   HENT: Negative for congestion.    Eyes: Negative for visual disturbance.   Respiratory: Negative for chest tightness and shortness of breath.    Cardiovascular: Negative for chest pain.   Gastrointestinal: Positive for abdominal pain. Negative for diarrhea, nausea and vomiting.   Genitourinary: Negative for dysuria.   Musculoskeletal: Negative for arthralgias.   Skin: Negative for rash.   Neurological: Negative for light-headedness.   Psychiatric/Behavioral: Negative for agitation.       Physical Exam   BP: (!) 113/92  Heart Rate: 109  Temp: 101.5  F (38.6  C)  Resp: 18  Height: 177.8 cm (5' 10\")  Weight: 65.8 kg (145 lb)  SpO2: 97 %      Physical Exam   Constitutional: He is oriented to person, place, and time. He appears well-developed and well-nourished. No distress.   HENT:   Head: Normocephalic and atraumatic.   Eyes: " Conjunctivae are normal.   Neck: Neck supple.   Cardiovascular: Normal rate, regular rhythm and normal heart sounds.    Pulmonary/Chest: Effort normal and breath sounds normal. No respiratory distress.   Abdominal: Soft. Bowel sounds are normal. He exhibits no distension. There is tenderness in the suprapubic area and left lower quadrant. There is rebound and guarding. There is no rigidity.   Neurological: He is alert and oriented to person, place, and time.   Skin: Skin is warm and dry. He is not diaphoretic.   Psychiatric: He has a normal mood and affect. His behavior is normal.       ED Course     ED Course     Procedures                 Results for orders placed or performed during the hospital encounter of 09/18/18 (from the past 24 hour(s))   CBC with platelets differential   Result Value Ref Range    WBC 18.6 (H) 4.0 - 11.0 10e9/L    RBC Count 4.57 4.4 - 5.9 10e12/L    Hemoglobin 15.3 13.3 - 17.7 g/dL    Hematocrit 42.7 40.0 - 53.0 %    MCV 93 78 - 100 fl    MCH 33.5 (H) 26.5 - 33.0 pg    MCHC 35.8 31.5 - 36.5 g/dL    RDW 12.2 10.0 - 15.0 %    Platelet Count 205 150 - 450 10e9/L    Diff Method Automated Method     % Neutrophils 84.3 %    % Lymphocytes 8.1 %    % Monocytes 6.8 %    % Eosinophils 0.1 %    % Basophils 0.3 %    % Immature Granulocytes 0.4 %    Absolute Neutrophil 15.7 (H) 1.6 - 8.3 10e9/L    Absolute Lymphocytes 1.5 0.8 - 5.3 10e9/L    Absolute Monocytes 1.3 0.0 - 1.3 10e9/L    Absolute Eosinophils 0.0 0.0 - 0.7 10e9/L    Absolute Basophils 0.1 0.0 - 0.2 10e9/L    Abs Immature Granulocytes 0.1 0 - 0.4 10e9/L   Comprehensive metabolic panel   Result Value Ref Range    Sodium 135 134 - 144 mmol/L    Potassium 3.9 3.5 - 5.1 mmol/L    Chloride 98 98 - 107 mmol/L    Carbon Dioxide 23 21 - 31 mmol/L    Anion Gap 14 3 - 14 mmol/L    Glucose 100 70 - 105 mg/dL    Urea Nitrogen 15 7 - 25 mg/dL    Creatinine 0.99 0.70 - 1.30 mg/dL    GFR Estimate 77 >60 mL/min/1.7m2    GFR Estimate If Black >90 >60  mL/min/1.7m2    Calcium 10.0 8.6 - 10.3 mg/dL    Bilirubin Total 1.5 (H) 0.3 - 1.0 mg/dL    Albumin 4.6 3.5 - 5.7 g/dL    Protein Total 8.0 6.4 - 8.9 g/dL    Alkaline Phosphatase 58 34 - 104 U/L    ALT 23 7 - 52 U/L    AST 17 13 - 39 U/L   Lactic acid   Result Value Ref Range    Lactic Acid 1.0 0.5 - 2.2 mmol/L   CT Abdomen Pelvis w Contrast    Narrative    PROCEDURE:  CT ABDOMEN PELVIS W CONTRAST    HISTORY: Worsening diverticulitis;     TECHNIQUE:  Helical CT of the abdomen and pelvis was performed  following injection of intravenous contrast.  Ingested oral contrast  partially opacifies the bowel.      COMPARISON:  8/14/2017, 10/30/2016    MEDS/CONTRAST: 100 ml Omnipaque 350    FINDINGS:      Limited images through the lung bases demonstrate no focal  consolidation or mass.  The heart size is normal. No pericardial or  pleural effusions are seen.    Sigmoid diverticulitis is redemonstrated, now with a small amount of  free air in the adjacent mesentery as well as extensive adjacent fat  stranding and trace fluid. The perforated diverticulum is near but  slightly proximal to the previously inflamed diverticulum seen on  8/14/2017 and distal to the inflamed diverticulum seen on 10/30/2016.  No abscess formation or remote free air is seen. No upstream bowel  obstruction is seen.    The liver demonstrates no mass or intrahepatic biliary ductal  dilatation.  The gallbladder, spleen, pancreas and adrenal glands are  unremarkable.  Symmetric nephrograms are present without evidence of  hydronephrosis. An exophytic cyst arising from the right kidney again  has some layering dense debris. A nonobstructive right renal calculus  is present. There is no abdominal aortic aneurysm. The prostate is  enlarged.    No suspicious osseous lesions are identified.      Impression    IMPRESSION:      This is the third episode of acute diverticulitis within the past 2  years, today associated with localized free air in the mesentery.  No  abscess formation is seen.    GOLDIE ORONA MD       Medications   sodium chloride 0.9% infusion ( Intravenous New Bag 9/18/18 1320)   piperacillin-tazobactam (ZOSYN) intermittent infusion 4.5 g (4.5 g Intravenous New Bag 9/18/18 1430)   fentaNYL (PF) (SUBLIMAZE) injection 50 mcg (not administered)   fentaNYL (PF) (SUBLIMAZE) injection 50 mcg (50 mcg Intravenous Given 9/18/18 1320)   iohexol (OMNIPAQUE) 350 mg/mL solution 100 mL (100 mLs Intravenous Given 9/18/18 1346)       Assessments & Plan (with Medical Decision Making)     I have reviewed the nursing notes.    I have reviewed the findings, diagnosis, plan and need for follow up with the patient.  Patient with acute diverticulitis with a small amount of free air.  Pain is well controlled with fentanyl.  We will start him on some IV Zosyn.  I have spoken with Dr. Little from general surgery and , hospitalist.  He will be admitted to the medical floor at this time.    New Prescriptions    No medications on file       Final diagnoses:   Acute diverticulitis       9/18/2018   [facility]     Noble Elder MD  09/18/18 3843

## 2018-09-18 NOTE — PHARMACY-ADMISSION MEDICATION HISTORY
Pharmacy -- Admission Medication Reconciliation    Prior to admission (PTA) medications were reviewed and the patient's PTA medication list was updated.    Sources Consulted: patient    The reliability of this Medication Reconciliation is: Reliability: Reliable    The following significant changes were made:  STOPPED ibuprofen      In addition, the patient's allergies were reviewed with the patient and updated as follows:   Allergies: Review of patient's allergies indicates no known allergies.    The pharmacist has reviewed with the patient that all personal medications should be removed from the building or locked in the belongings safe.  Patient shall only take medications ordered by the physician and administered by the nursing staff.       Medication barriers identified: none   Medication adherence concerns: none   Understanding of emergency medications: n/a    Veronika Muniz [facility], 9/18/2018,  3:04 PM

## 2018-09-18 NOTE — IP AVS SNAPSHOT
MRN:[id number]                      After Visit Summary   9/18/2018    Joséu Álvarez    MRN: [id number]           Thank you!     Thank you for choosing [facility] for your care. Our goal is always to provide you with excellent care. Hearing back from our patients is one way we can continue to improve our services. Please take a few minutes to complete the written survey that you may receive in the mail after you visit with us. Thank you!        Patient Information     Date Of Birth          1955        Designated Caregiver       Most Recent Value    Caregiver    Will someone help with your care after discharge? yes    Name of designated caregiver Vandana - daughter    Phone number of caregiver [phone]    Caregiver address NA      About your hospital stay     You were admitted on:  September 18, 2018 You [facility] received care in the:  [facility]    You were discharged on:  September 20, 2018        Reason for your hospital stay       Diverticulitis with microperforation                  Who to Call     For medical emergencies, please call 911.  For non-urgent questions about your medical care, please call your primary care provider or clinic, None          Attending Provider     Provider Specialty    Noble Elder MD Emergency Medicine    Brenton Sen MD Internal Medicine       Primary Care Provider Fax #    Physician No Ref-Primary [phone]       When to contact your care team       Call your primary doctor if you have any of the following: temperature greater than 101,  increased shortness of breath, increased drainage, increased swelling or increased pain.                  After Care Instructions     Activity       Your activity upon discharge: activity as tolerated            Diet       Follow this diet upon discharge: Orders Placed This Encounter      Low Fiber Diet for the next week then a high fiber diet with a goal of 30 grams of fiber a day             "Discharge Instructions       - take the antibiotics cipro and flagyl until they are gone to clear up your diverticulitis, there are no other changes to your medications at this time                  Follow-up Appointments     Follow-up and recommended labs and tests        Wednesday, 9/26 at 10:40 am with Dr. Royal                  Your next 10 appointments already scheduled     Sep 26, 2018 10:40 AM CDT   Return Visit with Koko Royal MD   [facility] ([facility])    [address]  [place] [address] [phone]              Pending Results     Date and Time Order Name Status Description    9/18/2018 1546 Urine Culture Aerobic Bacterial In process     9/18/2018 1252 Blood culture Preliminary     9/18/2018 1252 Blood culture Preliminary             Statement of Approval     Ordered          09/20/18 8643  I have reviewed and agree with all the recommendations and orders detailed in this document.  EFFECTIVE NOW     Approved and electronically signed by:  Brenton Sen MD             Admission Information     Date & Time Provider Department Dept. Phone    9/18/2018 Brenton Sen MD [facility] [phone]      Your Vitals Were     Blood Pressure Pulse Temperature Respirations Height Weight    118/74 80 97.7  F (36.5  C) (Tympanic) 16 1.778 m (5' 10\") 70.3 kg (155 lb)    Pulse Oximetry BMI (Body Mass Index)                98% 22.24 kg/m2          [employer] Information     [employer] lets you send messages to your doctor, view your test results, renew your prescriptions, schedule appointments and more. To sign up, go to [email or web address] . Click on \"Log in\" on the left side of the screen, which will take you to the Welcome page. Then click on \"Sign up Now\" on the right side of the page.     You will be asked to enter the access code listed below, as well as some personal information. Please follow the directions to create your " username and password.     Your access code is: [email or web address]  Expires: 2018  2:30 AM     Your access code will  in 90 days. If you need help or a new code, please call your [facility] clinic or [phone].        Care EveryWhere ID     This is your Care EveryWhere ID. This could be used by other organizations to access your [facility] medical records  [id number]        Equal Access to Services     [place] CHEN : Hadii aad ku hadasho Soomaali, waaxda luqadaha, qaybta kaalmada adeegyada, waxay curtisin hayrodolfon adehelen charlesezekielscooter johnson . So [facility] [phone].    ATENCIÓN: Si habla español, tiene a cooper disposición servicios gratuitos de asistencia lingüística. Llame al [phone].    We comply with applicable federal civil rights laws and [place] laws. We do not discriminate on the basis of race, color, national origin, age, disability, sex, sexual orientation, or gender identity.               Review of your medicines      CONTINUE these medicines which have NOT CHANGED        Dose / Directions    ASPIRIN ADULT LOW STRENGTH PO        Dose:  81 mg   Take 81 mg by mouth daily   Refills:  0       atorvastatin 20 MG tablet   Commonly known as:  LIPITOR        Dose:  20 mg   Take 20 mg by mouth At Bedtime   Refills:  0       busPIRone 15 MG tablet   Commonly known as:  BUSPAR        Dose:  15 mg   Take 15 mg by mouth 2 times daily as needed   Refills:  0       ciprofloxacin 500 MG tablet   Commonly known as:  CIPRO        Dose:  500 mg   Take 1 tablet (500 mg) by mouth 2 times daily for 10 days   Quantity:  20 tablet   Refills:  0       fluticasone 50 MCG/ACT spray   Commonly known as:  FLONASE        Dose:  1 spray   Spray 1 spray into both nostrils daily   Refills:  0       metroNIDAZOLE 500 MG tablet   Commonly known as:  FLAGYL        Dose:  500 mg   Take 1 tablet (500 mg) by mouth 4 times daily for 10 days   Quantity:  40 tablet   Refills:  0                Protect others around you: Learn how to safely use,  store and throw away your medicines at www.disposemymeds.org.             Medication List: This is a list of all your medications and when to take them. Check marks below indicate your daily home schedule. Keep this list as a reference.      Medications           Morning Afternoon Evening Bedtime As Needed    ASPIRIN ADULT LOW STRENGTH PO   Take 81 mg by mouth daily                                atorvastatin 20 MG tablet   Commonly known as:  LIPITOR   Take 20 mg by mouth At Bedtime                                busPIRone 15 MG tablet   Commonly known as:  BUSPAR   Take 15 mg by mouth 2 times daily as needed                                ciprofloxacin 500 MG tablet   Commonly known as:  CIPRO   Take 1 tablet (500 mg) by mouth 2 times daily for 10 days   Last time this was given:  500 mg on 9/20/2018  8:21 AM                                   fluticasone 50 MCG/ACT spray   Commonly known as:  FLONASE   Spray 1 spray into both nostrils daily                                metroNIDAZOLE 500 MG tablet   Commonly known as:  FLAGYL   Take 1 tablet (500 mg) by mouth 4 times daily for 10 days   Last time this was given:  500 mg on 9/20/2018  8:59 AM

## 2018-09-19 PROBLEM — K57.20 DIVERTICULITIS OF COLON WITH PERFORATION: Status: RESOLVED | Noted: 2018-09-18 | Resolved: 2018-09-19

## 2018-09-19 LAB
ALBUMIN UR-MCNC: NEGATIVE MG/DL
ANION GAP SERPL CALCULATED.3IONS-SCNC: 8 MMOL/L (ref 3–14)
APPEARANCE UR: CLEAR
BILIRUB UR QL STRIP: NEGATIVE
BUN SERPL-MCNC: 16 MG/DL (ref 7–25)
CALCIUM SERPL-MCNC: 8.6 MG/DL (ref 8.6–10.3)
CHLORIDE SERPL-SCNC: 105 MMOL/L (ref 98–107)
CO2 SERPL-SCNC: 24 MMOL/L (ref 21–31)
COLOR UR AUTO: YELLOW
CREAT SERPL-MCNC: 0.96 MG/DL (ref 0.7–1.3)
ERYTHROCYTE [DISTWIDTH] IN BLOOD BY AUTOMATED COUNT: 12.2 % (ref 10–15)
GFR SERPL CREATININE-BSD FRML MDRD: 79 ML/MIN/1.7M2
GLUCOSE SERPL-MCNC: 87 MG/DL (ref 70–105)
GLUCOSE UR STRIP-MCNC: NEGATIVE MG/DL
HCT VFR BLD AUTO: 36.4 % (ref 40–53)
HGB BLD-MCNC: 12.5 G/DL (ref 13.3–17.7)
HGB UR QL STRIP: ABNORMAL
KETONES UR STRIP-MCNC: NEGATIVE MG/DL
LEUKOCYTE ESTERASE UR QL STRIP: NEGATIVE
MAGNESIUM SERPL-MCNC: 2.2 MG/DL (ref 1.9–2.7)
MCH RBC QN AUTO: 32.6 PG (ref 26.5–33)
MCHC RBC AUTO-ENTMCNC: 34.3 G/DL (ref 31.5–36.5)
MCV RBC AUTO: 95 FL (ref 78–100)
NITRATE UR QL: NEGATIVE
PH UR STRIP: 5.5 PH (ref 5–9)
PLATELET # BLD AUTO: 175 10E9/L (ref 150–450)
POTASSIUM SERPL-SCNC: 4 MMOL/L (ref 3.5–5.1)
RBC # BLD AUTO: 3.83 10E12/L (ref 4.4–5.9)
RBC #/AREA URNS AUTO: NORMAL /HPF
SODIUM SERPL-SCNC: 137 MMOL/L (ref 134–144)
SOURCE: ABNORMAL
SP GR UR STRIP: <1.005 (ref 1–1.03)
UROBILINOGEN UR STRIP-ACNC: 0.2 EU/DL (ref 0.2–1)
WBC # BLD AUTO: 13.8 10E9/L (ref 4–11)
WBC #/AREA URNS AUTO: NORMAL /HPF

## 2018-09-19 PROCEDURE — 85027 COMPLETE CBC AUTOMATED: CPT | Performed by: INTERNAL MEDICINE

## 2018-09-19 PROCEDURE — 25000125 ZZHC RX 250: Performed by: INTERNAL MEDICINE

## 2018-09-19 PROCEDURE — 99232 SBSQ HOSP IP/OBS MODERATE 35: CPT | Performed by: INTERNAL MEDICINE

## 2018-09-19 PROCEDURE — 99231 SBSQ HOSP IP/OBS SF/LOW 25: CPT | Mod: 25 | Performed by: SURGERY

## 2018-09-19 PROCEDURE — 81001 URINALYSIS AUTO W/SCOPE: CPT | Performed by: INTERNAL MEDICINE

## 2018-09-19 PROCEDURE — 36415 COLL VENOUS BLD VENIPUNCTURE: CPT | Performed by: INTERNAL MEDICINE

## 2018-09-19 PROCEDURE — 25000128 H RX IP 250 OP 636: Performed by: INTERNAL MEDICINE

## 2018-09-19 PROCEDURE — 83735 ASSAY OF MAGNESIUM: CPT | Performed by: INTERNAL MEDICINE

## 2018-09-19 PROCEDURE — 25000132 ZZH RX MED GY IP 250 OP 250 PS 637: Performed by: INTERNAL MEDICINE

## 2018-09-19 PROCEDURE — 12000000 ZZH R&B MED SURG/OB

## 2018-09-19 PROCEDURE — 80048 BASIC METABOLIC PNL TOTAL CA: CPT | Performed by: INTERNAL MEDICINE

## 2018-09-19 PROCEDURE — 87086 URINE CULTURE/COLONY COUNT: CPT | Performed by: INTERNAL MEDICINE

## 2018-09-19 RX ADMIN — FAMOTIDINE 20 MG: 20 INJECTION, SOLUTION INTRAVENOUS at 16:19

## 2018-09-19 RX ADMIN — SODIUM CHLORIDE: 900 INJECTION, SOLUTION INTRAVENOUS at 17:02

## 2018-09-19 RX ADMIN — FAMOTIDINE 20 MG: 20 INJECTION, SOLUTION INTRAVENOUS at 05:25

## 2018-09-19 RX ADMIN — KETOROLAC TROMETHAMINE 30 MG: 30 INJECTION, SOLUTION INTRAMUSCULAR at 15:34

## 2018-09-19 RX ADMIN — TAZOBACTAM SODIUM AND PIPERACILLIN SODIUM 4.5 G: 500; 4 INJECTION, SOLUTION INTRAVENOUS at 15:41

## 2018-09-19 RX ADMIN — TAZOBACTAM SODIUM AND PIPERACILLIN SODIUM 4.5 G: 500; 4 INJECTION, SOLUTION INTRAVENOUS at 20:13

## 2018-09-19 RX ADMIN — KETOROLAC TROMETHAMINE 30 MG: 30 INJECTION, SOLUTION INTRAMUSCULAR at 07:46

## 2018-09-19 RX ADMIN — SODIUM CHLORIDE: 900 INJECTION, SOLUTION INTRAVENOUS at 07:36

## 2018-09-19 RX ADMIN — ACETAMINOPHEN 650 MG: 325 TABLET, FILM COATED ORAL at 22:18

## 2018-09-19 RX ADMIN — TAZOBACTAM SODIUM AND PIPERACILLIN SODIUM 4.5 G: 500; 4 INJECTION, SOLUTION INTRAVENOUS at 06:03

## 2018-09-19 ASSESSMENT — ACTIVITIES OF DAILY LIVING (ADL)
ADLS_ACUITY_SCORE: 11

## 2018-09-19 NOTE — PROGRESS NOTES
"NS ADMISSION NOTE    Patient admitted to room 312 at approximately 1545 via wheel chair from emergency room. Patient was accompanied by transport tech.     Verbal SBAR report received from GIO Guzmán prior to patient arrival.     Patient ambulated to bed independently. Patient alert and oriented X 4. The patient is having pain controled by fentanyl from ER. 0-10 Pain Scale: 4. Admission vital signs: Blood pressure 130/90, temperature 100.4  F (38  C), temperature source Tympanic, resp. rate 18, height 1.778 m (5' 10\"), weight 65.8 kg (145 lb), SpO2 96 %. Patient was oriented to plan of care, call light, bed controls, tv, telephone, bathroom and visiting hours.     Risk Assessment    The following safety risks were identified during admission: none. Yellow risk band applied: NO.     Skin Initial Assessment    This writer admitted this patient and completed a full skin assessment and Williams score in the Adult PCS flowsheet. Appropriate interventions initiated as needed.       Skin  Inspection of bony prominences: Full  Inspection under devices: Full  Skin WDL: WDL    Williams Risk Assessment  Sensory Perception: 4-->no impairment  Moisture: 4-->rarely moist  Activity: 3-->walks occasionally  Mobility: 3-->slightly limited  Nutrition: 3-->adequate  Friction and Shear: 3-->no apparent problem  Williams Score: 20    Ester Hanson RN on 9/18/2018 at 4:00 PM      "

## 2018-09-19 NOTE — PROGRESS NOTES
"Surgical Progress Note     POD# 1  Sigmoid diverticulitis    Subjective: Less pain. No fevers  Overnight.    Objective:  /74  Pulse 80  Temp 98.8  F (37.1  C) (Tympanic)  Resp 16  Ht 1.778 m (5' 10\")  Wt 69.7 kg (153 lb 10.6 oz)  SpO2 95%  BMI 22.05 kg/m2    ABDOMEN: softer and less tender    LABS  Recent Labs   Lab Test  09/19/18   0432  09/18/18   1305   08/16/17   0532  08/14/17   1828   WBC  13.8*  18.6*   < >   --    --    RBC  3.83*  4.57   < >   --    --    HGB  12.5*  15.3   < >  13.3*  14.8   HCT  36.4*  42.7   < >  37.5  40.9   MCV  95  93   < >  95  92   MCH  32.6  33.5*   < >  33.8  33.4   MCHC  34.3  35.8   < >  35.5  36.2*   PLT  175  205   < >  163  169   MPV   --    --    --   9.6  9.3    < > = values in this interval not displayed.       Recent Labs   Lab Test  09/18/18   1305  09/17/18   0115   POTASSIUM  3.9  4.1   CHLORIDE  98  102   BUN  15  24       Recent Labs   Lab Test  09/18/18   1305  09/17/18   0147  09/17/18   0115   PROTEIN   --   Negative   --    BILITOTAL  1.5*   --   0.4   AST  17   --   17   ALT  23   --   26       ASSESSMENT  Sigmoid diverticulitis improving.    PLAN  Continue current plan      Koko Royal      "

## 2018-09-19 NOTE — PROGRESS NOTES
Patient given information on Diverticulitis diet per request. Aliyah Rojas RN 9/19/2018 6:09 AM

## 2018-09-19 NOTE — PROGRESS NOTES
[facility]    Hospitalist Progress Note      Assessment & Plan   Josué Álvarez is a 63 year old male who was admitted on 9/18/2018.     Principal Problem:    Diverticulitis of large intestine with perforation without bleeding    Assessment: improved. No further fever, wbc improved and symptoms improved. He will benefit from IV antibiotics with close monitoring bowel rest and a surgical consult.  Third episode within the past 2 years.    Plan: - IV Zosyn day 2                        - appreciate general surgery consult                        - clears                        - as needed pain control                        - trend white blood cell count     Active Problems:    History of tobacco abuse    Assessment: No cigarettes over 4 years    Plan: - nicotine patch as needed    RBBB  Assessment: Incidentally noted on EKG  Plan: Consider further workup as an outpatient     FEN: clear liquid diet, normal saline at 125 mL/hr, mg/k replacement protocol  PPX: SCD's     Code Status: Full Code    Brenton Sen    Interval History   No acute events and afebrile, lower quadrant pain improved overall, continues to feel fatigued but improving, no nausea or vomiting, no new shortness of breath, chest pain palpitations or other new complaints.    -Data reviewed today: I reviewed all new labs and imaging results over the last 24 hours. I personally reviewed the EKG tracing showing Normal sinus rhythm, normal axis, T-wave inversion in lead III, right bundle branch block, no other ST-T wave inversions elevations or depressions, no prior EKG available for comparison..    Physical Exam   Temp: 98.3  F (36.8  C) Temp src: Tympanic BP: 122/82 Pulse: 80 Heart Rate: 78 Resp: 16 SpO2: 97 % O2 Device: None (Room air)    Vitals:    09/18/18 1249 09/19/18 0551   Weight: 65.8 kg (145 lb) 69.7 kg (153 lb 10.6 oz)     Vital Signs with Ranges  Temp:  [98.3  F (36.8  C)-102  F (38.9  C)] 98.3  F (36.8  C)  Pulse:  [80] 80  Heart  Rate:  [] 78  Resp:  [16-18] 16  BP: (106-139)/() 122/82  SpO2:  [92 %-98 %] 97 %  I/O last 3 completed shifts:  In: 2343 [P.O.:635; I.V.:1708]  Out: 350 [Urine:350]    Exam:  GENERAL: Talkative, in no apparent distress.  CARDIOVASCULAR: regular rate and rhythm, no murmurs, rubs, or gallops. Normal S1/S2. No lower extremity edema.   RESPIRATORY: clear to auscultation bilaterally, no wheezes, no crackles.    GI: soft, non-distended, mild tenderness that is improved today with deep palpation of the left lower quadrant, no peritoneal signs of rebound or guarding.   MUSCULOSKELETAL: warm and well perfused, 2+ dorsalis pedis pulses.    SKIN: no pallor, jaundice or rashes.  NEUROLOGY: AAOx3, follows commands, speech and language without focal deficits.     Medications     sodium chloride 125 mL/hr at 09/19/18 0736       famotidine  20 mg Intravenous Q12H     piperacillin-tazobactam  4.5 g Intravenous Q8H ROWENA     sodium chloride (PF)  3 mL Intracatheter Q8H       Data     Recent Labs  Lab 09/19/18  0432 09/18/18  1305 09/17/18  0115   WBC 13.8* 18.6* 12.4*   HGB 12.5* 15.3 14.5   MCV 95 93 92    205 210    135 138   POTASSIUM 4.0 3.9 4.1   CHLORIDE 105 98 102   CO2 24 23 26   BUN 16 15 24   CR 0.96 0.99 1.08   ANIONGAP 8 14 10   KAREN 8.6 10.0 10.0   GLC 87 100 99   ALBUMIN  --  4.6 4.3   PROTTOTAL  --  8.0 7.0   BILITOTAL  --  1.5* 0.4   ALKPHOS  --  58 59   ALT  --  23 26   AST  --  17 17       Recent Results (from the past 24 hour(s))   CT Abdomen Pelvis w Contrast    Narrative    PROCEDURE:  CT ABDOMEN PELVIS W CONTRAST    HISTORY: Worsening diverticulitis;     TECHNIQUE:  Helical CT of the abdomen and pelvis was performed  following injection of intravenous contrast.  Ingested oral contrast  partially opacifies the bowel.      COMPARISON:  8/14/2017, 10/30/2016    MEDS/CONTRAST: 100 ml Omnipaque 350    FINDINGS:      Limited images through the lung bases demonstrate no focal  consolidation or  mass.  The heart size is normal. No pericardial or  pleural effusions are seen.    Sigmoid diverticulitis is redemonstrated, now with a small amount of  free air in the adjacent mesentery as well as extensive adjacent fat  stranding and trace fluid. The perforated diverticulum is near but  slightly proximal to the previously inflamed diverticulum seen on  8/14/2017 and distal to the inflamed diverticulum seen on 10/30/2016.  No abscess formation or remote free air is seen. No upstream bowel  obstruction is seen.    The liver demonstrates no mass or intrahepatic biliary ductal  dilatation.  The gallbladder, spleen, pancreas and adrenal glands are  unremarkable.  Symmetric nephrograms are present without evidence of  hydronephrosis. An exophytic cyst arising from the right kidney again  has some layering dense debris. A nonobstructive right renal calculus  is present. There is no abdominal aortic aneurysm. The prostate is  enlarged.    No suspicious osseous lesions are identified.      Impression    IMPRESSION:      This is the third episode of acute diverticulitis within the past 2  years, today associated with localized free air in the mesentery. No  abscess formation is seen.    GOLDIE ORONA MD

## 2018-09-19 NOTE — PLAN OF CARE
Problem: Patient Care Overview  Goal: Plan of Care/Patient Progress Review  Patient's abdominal pain controled with tylenol and Toradol. Patient also has low urine output. 350 mls since admission. Urine is dark cheryl. Had 1 BM this shift. Independent in room, steady of feet, A&O. VSS afebrile. Aliyah Rojas RN 9/19/2018 5:47 AM

## 2018-09-19 NOTE — PLAN OF CARE
Problem: Patient Care Overview  Goal: Plan of Care/Patient Progress Review  Pt given Toradol x 2 for abdominal pain rated at 8/10 at the highest, pt satisfied with pain control. Pt has minimal nausea, declines Zofran. Pt tolerating clear liquid diet well. Pt voiding well, states that he has had some loose stools. Pt took shower this afternoon, ambulating in room independently. IV Abx given, VSS, pt afebrile, call light within reach. Will continue to monitor.   Ester Hanson RN on 9/19/2018 at 4:59 PM

## 2018-09-20 VITALS
BODY MASS INDEX: 22.19 KG/M2 | RESPIRATION RATE: 16 BRPM | HEIGHT: 70 IN | SYSTOLIC BLOOD PRESSURE: 118 MMHG | TEMPERATURE: 97.7 F | HEART RATE: 80 BPM | DIASTOLIC BLOOD PRESSURE: 74 MMHG | WEIGHT: 155 LBS | OXYGEN SATURATION: 98 %

## 2018-09-20 LAB
ERYTHROCYTE [DISTWIDTH] IN BLOOD BY AUTOMATED COUNT: 12.1 % (ref 10–15)
HCT VFR BLD AUTO: 34 % (ref 40–53)
HGB BLD-MCNC: 11.6 G/DL (ref 13.3–17.7)
MAGNESIUM SERPL-MCNC: 2.3 MG/DL (ref 1.9–2.7)
MCH RBC QN AUTO: 32.5 PG (ref 26.5–33)
MCHC RBC AUTO-ENTMCNC: 34.1 G/DL (ref 31.5–36.5)
MCV RBC AUTO: 95 FL (ref 78–100)
PLATELET # BLD AUTO: 174 10E9/L (ref 150–450)
POTASSIUM SERPL-SCNC: 4 MMOL/L (ref 3.5–5.1)
RBC # BLD AUTO: 3.57 10E12/L (ref 4.4–5.9)
WBC # BLD AUTO: 7.6 10E9/L (ref 4–11)

## 2018-09-20 PROCEDURE — 84132 ASSAY OF SERUM POTASSIUM: CPT | Performed by: INTERNAL MEDICINE

## 2018-09-20 PROCEDURE — 83735 ASSAY OF MAGNESIUM: CPT | Performed by: INTERNAL MEDICINE

## 2018-09-20 PROCEDURE — 36415 COLL VENOUS BLD VENIPUNCTURE: CPT | Performed by: INTERNAL MEDICINE

## 2018-09-20 PROCEDURE — 85027 COMPLETE CBC AUTOMATED: CPT | Performed by: INTERNAL MEDICINE

## 2018-09-20 PROCEDURE — 25000125 ZZHC RX 250: Performed by: INTERNAL MEDICINE

## 2018-09-20 PROCEDURE — 99231 SBSQ HOSP IP/OBS SF/LOW 25: CPT | Mod: 25 | Performed by: SURGERY

## 2018-09-20 PROCEDURE — 25000132 ZZH RX MED GY IP 250 OP 250 PS 637: Performed by: INTERNAL MEDICINE

## 2018-09-20 PROCEDURE — 25000128 H RX IP 250 OP 636: Performed by: INTERNAL MEDICINE

## 2018-09-20 PROCEDURE — 99238 HOSP IP/OBS DSCHRG MGMT 30/<: CPT | Performed by: INTERNAL MEDICINE

## 2018-09-20 RX ORDER — METRONIDAZOLE 500 MG/1
500 TABLET ORAL EVERY 8 HOURS SCHEDULED
Status: DISCONTINUED | OUTPATIENT
Start: 2018-09-20 | End: 2018-09-20 | Stop reason: HOSPADM

## 2018-09-20 RX ORDER — CIPROFLOXACIN 500 MG/1
500 TABLET, FILM COATED ORAL EVERY 12 HOURS SCHEDULED
Status: DISCONTINUED | OUTPATIENT
Start: 2018-09-20 | End: 2018-09-20 | Stop reason: HOSPADM

## 2018-09-20 RX ORDER — FAMOTIDINE 20 MG/1
20 TABLET, FILM COATED ORAL 2 TIMES DAILY
Status: DISCONTINUED | OUTPATIENT
Start: 2018-09-20 | End: 2018-09-20 | Stop reason: HOSPADM

## 2018-09-20 RX ADMIN — SODIUM CHLORIDE: 900 INJECTION, SOLUTION INTRAVENOUS at 01:29

## 2018-09-20 RX ADMIN — FAMOTIDINE 20 MG: 20 INJECTION, SOLUTION INTRAVENOUS at 04:35

## 2018-09-20 RX ADMIN — TAZOBACTAM SODIUM AND PIPERACILLIN SODIUM 4.5 G: 500; 4 INJECTION, SOLUTION INTRAVENOUS at 01:30

## 2018-09-20 RX ADMIN — METRONIDAZOLE 500 MG: 500 TABLET ORAL at 08:59

## 2018-09-20 RX ADMIN — CIPROFLOXACIN 500 MG: 500 TABLET, FILM COATED ORAL at 08:21

## 2018-09-20 ASSESSMENT — ACTIVITIES OF DAILY LIVING (ADL)
ADLS_ACUITY_SCORE: 11

## 2018-09-20 NOTE — PHARMACY - DISCHARGE MEDICATION RECONCILIATION AND EDUCATION
Pharmacy: Discharge Counseling and Medication Reconciliation    Josué Álvarez  [address]  [place] [id number] [phone] (home)   63 year old male  PCP:No Ref-Primary, Physician    [unfilled]    Discharge Counseling:    Pharmacist met with patient (and/or family) today to review the medication portion of the After Visit Summary (with an emphasis on NEW medications) and to address patient's questions/concerns.     Summary of Education:   Met with patient at time of discharge to review all changes in medications. Patient was advised to complete the rest of the antibiotics previously prescribed to him (metronidazole and ciprofloxacin). Patient states that he will complete both antibiotics until gone. Patient asked no questions and all concerns were addressed.     Materials Provided:   MedCounselor sheets printed from Clinical Pharmacology on: none    Discharge Medication Reconciliation:    Loretta Richards has reviewed the patient's discharge medication orders and has compared them to the inpatient medication administration record and to what the patient was taking prior to admission- any discrepancies have been resolved.     Thank you for the consult.     Loretta Richards ....................  9/20/2018   10:17 AM

## 2018-09-20 NOTE — DISCHARGE SUMMARY
[facility]    Discharge Summary  Hospitalist    Date of Admission:  9/18/2018  Date of Discharge:  9/20/2018  Discharging Provider: Brenton Sen  Date of Service (when I saw the patient): 09/20/18    Discharge Diagnoses   Principal Problem:    Diverticulitis of large intestine with perforation without bleeding (9/18/2018)  Active Problems:    History of tobacco abuse (10/30/2016)      History of Present Illness   Josué Álvarez is an 63 year old male who presented with the above. Patient previously had a colonoscopy April 2018 with polypectomies and significant diverticulosis within the left colon.  Last episode of diverticulitis was little over a year ago.  He developed left lower quadrant abdominal pain 3 days ago which felt like prior diverticulitis.  He presented to the emergency department day prior to admission, had significant stool in his abdominal x-ray, minimal white blood cell count and no fever, was sent home with a prescription for Cipro and Flagyl to start if after moving his bowels he continued to have pain.  The pain suddenly worsened and he returned back to the emergency department.     In the ER today he underwent a CT of abdomen and pelvis was noted to have diverticulitis with localized perforation, started on IV Zosyn and subsequently admitted for further management.     Hospital Course   Josué Álvarez was admitted on 9/18/2018.  The following problems were addressed during his hospitalization:     Diverticulitis of large intestine with perforation without bleeding: Initially started on clear liquids, IV Zosyn was consulted upon by Dr. Royal of general surgery was monitored.  His fevers resolved, white blood cell count also returned to normal, pain was significantly improved and he had no pain medication requirement 24 hours prior to discharge.  His diet was advanced to a low residue diet which she also tolerated and he was eager to discharge.  Will complete a full course of  Cipro and Flagyl which she had filled in the emergency department and given his antibiotic administration in the hospital will complete a full course with his existing medications.  He will continue with a low fiber diet for the next week and then a high-fiber diet goal of 30 g of fiber daily.  He will follow-up with Dr. Royal's clinic for routine post hospital follow-up.    Significant Results and Procedures   None    Pending Results   These results will be followed up by PCP/Dr. Royal  Unresulted Labs Ordered in the Past 30 Days of this Admission     Date and Time Order Name Status Description    9/18/2018 1546 Urine Culture Aerobic Bacterial In process     9/18/2018 1252 Blood culture Preliminary     9/18/2018 1252 Blood culture Preliminary           Code Status   Full Code       Primary Care Physician   Physician No Ref-Primary    Physical Exam   Temp: 97.7  F (36.5  C) Temp src: Tympanic BP: 118/74   Heart Rate: 72 Resp: 16 SpO2: 98 % O2 Device: None (Room air)    Vitals:    09/18/18 1249 09/19/18 0551 09/20/18 0443   Weight: 65.8 kg (145 lb) 69.7 kg (153 lb 10.6 oz) 70.3 kg (155 lb)     Vital Signs with Ranges  Temp:  [97.7  F (36.5  C)-99  F (37.2  C)] 97.7  F (36.5  C)  Heart Rate:  [72-79] 72  Resp:  [16-18] 16  BP: (118-126)/(69-79) 118/74  SpO2:  [95 %-98 %] 98 %  I/O last 3 completed shifts:  In: 4834 [P.O.:1700; I.V.:3134]  Out: 2050 [Urine:2050]    Exam discharge on day of discharge:  GENERAL: Talkative, in no apparent distress.  CARDIOVASCULAR: regular rate and rhythm, no murmurs, rubs, or gallops. Normal S1/S2. No lower extremity edema.   RESPIRATORY: clear to auscultation bilaterally, no wheezes, no crackles.    GI: soft, non-distended, nearly resolved tenderness left lower quadrant, no other pain to deep palpation in any other quadrants, no peritoneal signs of rebound or guarding.   MUSCULOSKELETAL: warm and well perfused, 2+ dorsalis pedis pulses.    SKIN: no pallor, jaundice or  michael.  NEUROLOGY: AAOx3, follows commands, speech and language without focal deficits.     Discharge Disposition   Discharged to home  Condition at discharge: Stable    Consultations This Hospital Stay   SURGERY GENERAL IP CONSULT    Time Spent on this Encounter   I, Brenton Sen, personally saw the patient today and spent less than or equal to 30 minutes discharging this patient.    Discharge Orders     Reason for your hospital stay   Diverticulitis with microperforation     Follow-up and recommended labs and tests    Wednesday, 9/26 at 10:40 am with Dr. Royal     Activity   Your activity upon discharge: activity as tolerated     When to contact your care team   Call your primary doctor if you have any of the following: temperature greater than 101,  increased shortness of breath, increased drainage, increased swelling or increased pain.     Discharge Instructions   - take the antibiotics cipro and flagyl until they are gone to clear up your diverticulitis, there are no other changes to your medications at this time     Full Code     Diet   Follow this diet upon discharge: Orders Placed This Encounter     Low Fiber Diet for the next week then a high fiber diet with a goal of 30 grams of fiber a day       Discharge Medications   Discharge Medication List as of 9/20/2018 10:23 AM      CONTINUE these medications which have NOT CHANGED    Details   ASPIRIN ADULT LOW STRENGTH PO Take 81 mg by mouth daily, Historical      atorvastatin (LIPITOR) 20 MG tablet Take 20 mg by mouth At Bedtime, Historical      busPIRone (BUSPAR) 15 MG tablet Take 15 mg by mouth 2 times daily as needed, Historical      ciprofloxacin (CIPRO) 500 MG tablet Take 1 tablet (500 mg) by mouth 2 times daily for 10 days, Disp-20 tablet, R-0, Local Print      fluticasone (FLONASE) 50 MCG/ACT spray Spray 1 spray into both nostrils daily , Historical      metroNIDAZOLE (FLAGYL) 500 MG tablet Take 1 tablet (500 mg) by mouth 4 times daily for 10 days, Disp-40  tablet, R-0, Local PrintEat yogurt or cottage cheese daily to prevent diarrhea that can be caused by taking this antibiotic.           Allergies   No Known Allergies  Data   Most Recent 3 CBC's:  Recent Labs   Lab Test  09/20/18   0432  09/19/18   0432  09/18/18   1305   WBC  7.6  13.8*  18.6*   HGB  11.6*  12.5*  15.3   MCV  95  95  93   PLT  174  175  205      Most Recent 3 BMP's:  Recent Labs   Lab Test  09/20/18   0432  09/19/18   0432  09/18/18   1305  09/17/18   0115   NA   --   137  135  138   POTASSIUM  4.0  4.0  3.9  4.1   CHLORIDE   --   105  98  102   CO2   --   24  23  26   BUN   --   16  15  24   CR   --   0.96  0.99  1.08   ANIONGAP   --   8  14  10   KAREN   --   8.6  10.0  10.0   GLC   --   87  100  99     Most Recent 2 LFT's:  Recent Labs   Lab Test  09/18/18   1305  09/17/18   0115   AST  17  17   ALT  23  26   ALKPHOS  58  59   BILITOTAL  1.5*  0.4     Most Recent INR's and Anticoagulation Dosing History:  Anticoagulation Dose History     There is no flowsheet data to display.        Most Recent 3 Troponin's:No lab results found.  Most Recent Cholesterol Panel:No lab results found.  Most Recent 6 Bacteria Isolates From Any Culture (See EPIC Reports for Culture Details):  Recent Labs   Lab Test  09/18/18   1305   CULT  No growth after 2 days  No growth after 2 days     Most Recent TSH, T4 and A1c Labs:No lab results found.  Results for orders placed or performed during the hospital encounter of 09/18/18   CT Abdomen Pelvis w Contrast    Narrative    PROCEDURE:  CT ABDOMEN PELVIS W CONTRAST    HISTORY: Worsening diverticulitis;     TECHNIQUE:  Helical CT of the abdomen and pelvis was performed  following injection of intravenous contrast.  Ingested oral contrast  partially opacifies the bowel.      COMPARISON:  8/14/2017, 10/30/2016    MEDS/CONTRAST: 100 ml Omnipaque 350    FINDINGS:      Limited images through the lung bases demonstrate no focal  consolidation or mass.  The heart size is normal. No  pericardial or  pleural effusions are seen.    Sigmoid diverticulitis is redemonstrated, now with a small amount of  free air in the adjacent mesentery as well as extensive adjacent fat  stranding and trace fluid. The perforated diverticulum is near but  slightly proximal to the previously inflamed diverticulum seen on  8/14/2017 and distal to the inflamed diverticulum seen on 10/30/2016.  No abscess formation or remote free air is seen. No upstream bowel  obstruction is seen.    The liver demonstrates no mass or intrahepatic biliary ductal  dilatation.  The gallbladder, spleen, pancreas and adrenal glands are  unremarkable.  Symmetric nephrograms are present without evidence of  hydronephrosis. An exophytic cyst arising from the right kidney again  has some layering dense debris. A nonobstructive right renal calculus  is present. There is no abdominal aortic aneurysm. The prostate is  enlarged.    No suspicious osseous lesions are identified.      Impression    IMPRESSION:      This is the third episode of acute diverticulitis within the past 2  years, today associated with localized free air in the mesentery. No  abscess formation is seen.    GOLDIE ORONA MD

## 2018-09-20 NOTE — PROGRESS NOTES
"Surgical Progress Note     HD# 3  Sigmoid diverticulitis    Subjective: No narcotics needed overnight. Passing flatus. Tolerating liquids.    Objective:  /69  Pulse 80  Temp 97.9  F (36.6  C) (Tympanic)  Resp 16  Ht 1.778 m (5' 10\")  Wt 70.3 kg (155 lb)  SpO2 97%  BMI 22.24 kg/m2      ABDOMEN: soft and non-tender. Minimal LLQ discomfort to palpation    LABS  Recent Labs   Lab Test  09/20/18 0432 09/19/18   0432   08/16/17   0532  08/14/17   1828   WBC  7.6  13.8*   < >   --    --    RBC  3.57*  3.83*   < >   --    --    HGB  11.6*  12.5*   < >  13.3*  14.8   HCT  34.0*  36.4*   < >  37.5  40.9   MCV  95  95   < >  95  92   MCH  32.5  32.6   < >  33.8  33.4   MCHC  34.1  34.3   < >  35.5  36.2*   PLT  174  175   < >  163  169   MPV   --    --    --   9.6  9.3    < > = values in this interval not displayed.       Recent Labs   Lab Test  09/20/18 0432 09/19/18 0432 09/18/18   1305   POTASSIUM  4.0  4.0  3.9   CHLORIDE   --   105  98   BUN   --   16  15       Recent Labs   Lab Test  09/19/18 2009 09/18/18   1305  09/17/18   0147  09/17/18   0115   PROTEIN  Negative   --   Negative   --    BILITOTAL   --   1.5*   --   0.4   AST   --   17   --   17   ALT   --   23   --   26         ASSESSMENT  Sigmoid diverticulitis, improving. No fevers and WBC returned to normal.     PLAN  Increase diet  Complete 10-14 days of antibiotics  Low residue diet for first week then High fiber 30 g daily    Koko Royal"

## 2018-09-20 NOTE — PROGRESS NOTES
[facility] DISCHARGE NOTE    Patient discharged to home at 11:00 AM via ambulation. Accompanied by daughter and staff. Discharge instructions reviewed with patient, opportunity offered to ask questions. Prescriptions - None ordered for discharge. All belongings sent with patient.    Ester Hanson RN on 9/20/2018 at 11:26 AM

## 2018-09-20 NOTE — PLAN OF CARE
Problem: Bowel Disease, Inflammatory (Adult)  Goal: Signs and Symptoms of Listed Potential Problems Will be Absent, Minimized or Managed (Bowel Disease, Inflammatory)  Signs and symptoms of listed potential problems will be absent, minimized or managed by discharge/transition of care (reference Bowel Disease, Inflammatory (Adult) CPG).   Patients VSS, afebrile, urine output improved, tylenol given x1 for abdominal discomfort. Bowel sounds active, independent in room. Tolerating clear liquid diet. Aliyah Rojas RN 9/20/2018 5:29 AM

## 2018-09-22 LAB
BACTERIA SPEC CULT: NORMAL
SPECIMEN SOURCE: NORMAL

## 2018-09-24 LAB
BACTERIA SPEC CULT: NORMAL
BACTERIA SPEC CULT: NORMAL
SPECIMEN SOURCE: NORMAL
SPECIMEN SOURCE: NORMAL

## 2018-09-26 ENCOUNTER — TELEPHONE (OUTPATIENT)
Dept: SURGERY | Facility: OTHER | Age: 63
End: 2018-09-26

## 2018-09-26 NOTE — TELEPHONE ENCOUNTER
Has a post-op with primary in [facility].  He has no complaints and states he is doing good.  Charleen Bautista LPN..........9/26/2018  12:37 PM

## 2018-11-27 ENCOUNTER — TRANSFERRED RECORDS (OUTPATIENT)
Dept: HEALTH INFORMATION MANAGEMENT | Facility: OTHER | Age: 63
End: 2018-11-27

## 2019-02-06 DIAGNOSIS — G89.29 CHRONIC PAIN OF LEFT ANKLE: Primary | ICD-10-CM

## 2019-02-06 DIAGNOSIS — M25.572 CHRONIC PAIN OF LEFT ANKLE: Primary | ICD-10-CM

## 2019-05-23 ENCOUNTER — MEDICAL CORRESPONDENCE (OUTPATIENT)
Dept: LAB | Facility: OTHER | Age: 64
End: 2019-05-23

## 2019-05-23 DIAGNOSIS — R97.20 ELEVATED PROSTATE SPECIFIC ANTIGEN (PSA): Primary | ICD-10-CM

## 2019-05-23 LAB — PSA SERPL-MCNC: 8.97 NG/ML

## 2019-05-23 PROCEDURE — 84153 ASSAY OF PSA TOTAL: CPT

## 2019-05-23 PROCEDURE — 36415 COLL VENOUS BLD VENIPUNCTURE: CPT

## 2019-06-19 ENCOUNTER — OFFICE VISIT (OUTPATIENT)
Dept: UROLOGY | Facility: OTHER | Age: 64
End: 2019-06-19
Attending: UROLOGY
Payer: COMMERCIAL

## 2019-06-19 VITALS
BODY MASS INDEX: 22.1 KG/M2 | HEART RATE: 80 BPM | WEIGHT: 154 LBS | DIASTOLIC BLOOD PRESSURE: 60 MMHG | RESPIRATION RATE: 16 BRPM | SYSTOLIC BLOOD PRESSURE: 140 MMHG

## 2019-06-19 DIAGNOSIS — C61 PROSTATE CANCER (H): Primary | ICD-10-CM

## 2019-06-19 PROCEDURE — G0463 HOSPITAL OUTPT CLINIC VISIT: HCPCS

## 2019-06-19 PROCEDURE — 99204 OFFICE O/P NEW MOD 45 MIN: CPT | Performed by: UROLOGY

## 2019-06-19 ASSESSMENT — PAIN SCALES - GENERAL: PAINLEVEL: NO PAIN (0)

## 2019-06-19 NOTE — PROGRESS NOTES
Type of Visit  NPV    Chief Complaint  Prostate cancer, low risk on active surveillance    HPI  Mr. Álvarez is a 63 year old male who presents with prostate cancer on active surveillance  The patient was initially diagnosed in 2013 with biopsy due to a PSA of 9.2  The patient underwent repeat biopsy in 2014 which was negative as well as MRI guided prostate biopsy in 2016 which was also negative.  The patient's PSA continues to be elevated but stable over the last 6 years.  He denies any new symptoms such as unintentional weight loss, bone or pelvic pain.    Outside hospital records were reviewed for this visit.  The patient's prior urologist was [facility].      Past Medical History  He  has a past medical history of Abdominal aortic atherosclerosis (H), Cyst of right kidney, Diverticulitis of intestine without perforation or abscess without bleeding, History of prostate biopsy, History of tobacco abuse, Left inguinal hernia, and LLQ abdominal pain.  Patient Active Problem List   Diagnosis     Abdominal aortic atherosclerosis (H)     Cyst of right kidney     History of tobacco abuse     Alcohol dependence (H)     Anxiety     Hyperlipidemia     Prostate cancer (H)     H/O adenomatous polyp of colon     Diverticulitis of large intestine with perforation without bleeding       Past Surgical History  He  has a past surgical history that includes Colonoscopy (N/A, 4/6/2018) and HX SHOULDER  (Left).    Medications  He has a current medication list which includes the following prescription(s): aspirin, atorvastatin, buspirone, and fluticasone.    Allergies  No Known Allergies    Social History  He  reports that he quit smoking about 4 years ago. He has never used smokeless tobacco. He reports that he drinks alcohol. He reports that he does not use drugs.  No drug abuse.    Family History  Family History   Problem Relation Age of Onset     No Known Problems Other        Review of  Systems  I personally reviewed the ROS with the patient.    Nursing Notes:   Juventino Kimberly A., LPN  6/19/2019  3:13 PM  Signed  Pt presents to clinic for consult for prostate cancer    Review of Systems:    Weight loss:    No     Recent fever/chills:  No   Night sweats:   No  Current skin rash:  No   Recent hair loss:  No  Heat intolerance:  No   Cold intolerance:  No  Chest pain:   No   Palpitations:   No  Shortness of breath:  No   Wheezing:   No  Constipation:    No   Diarrhea:   No   Nausea:   No   Vomiting:   No   Kidney/side pain:  No   Back pain:   No  Frequent headaches:  No   Dizziness:     No  Leg swelling:   No   Calf pain:    No    Parents, brothers or sisters with history of kidney cancer:   No  Parents, brothers or sisters with history of bladder cancer: No  Father or brother with history of prostate cancer:  No      Physical Exam  Vitals:    06/19/19 1455   BP: 140/60   BP Location: Left arm   Patient Position: Sitting   Cuff Size: Adult Regular   Pulse: 80   Resp: 16   Weight: 69.9 kg (154 lb)   Constitutional: No acute distress.  Alert and cooperative   Head: NCAT  Eyes: Conjunctivae normal  Cardiovascular: Regular rate.  Pulmonary/Chest: Respirations are even and non-labored bilaterally, no audible wheezing  Abdominal: Soft. No distension, tenderness, masses or guarding.   Neurological: A + O x 3.  Cranial Nerves II-XII grossly intact.  Extremities: FLAQUITA x 4, Warm. No clubbing.  No cyanosis.    Skin: Pink, warm and dry.  No visible rashes noted.  Psychiatric:  Normal mood and affect  Back:  No left CVA tenderness.  No right CVA tenderness.  Genitourinary:  Nonpalpable bladder    Labs  Results for orders placed or performed in visit on 05/23/19   Prostate Specific Antigen GH   Result Value Ref Range    Prostate Specific Antigen 8.973 (H) <3.100 ng/mL     Past  PSAs  11/2018 9.73  5/2018  14.6  11/2017 11.6  4/2017  7.4  8/2016  9.8  1/2016  10.7  6/2015  8.73  5/2014  9.9  2/2014  10.3  10/2013 9.2*    *prompted biopsy    Imaging  CT a/p   9/18/2019  I personally reviewed and interpreted the images and report.  IMPRESSION:    This is the third episode of acute diverticulitis within the past 2  years, today associated with localized free air in the mesentery. No  abscess formation is seen.    Assessment  Mr. Álvarez is a 63 year old male who presents with previously diagnosed low risk prostate cancer in 2013 who is on active surveillance.    I discussed my active surveillance protocol which includes a PSA every 6 months.  Typically I obtain prostate biopsy at 1 year after diagnosis.  The patient has undergone 3 biopsies in the last 6 years including an MRI all with quite favorable adults.  Therefore I did not recommend a repeat biopsy at this time.    The recent PSA is stable compared to the last 6-years of values.    Plan  Follow up PSA q6 months

## 2019-06-19 NOTE — NURSING NOTE
Pt presents to clinic for consult for prostate cancer    Review of Systems:    Weight loss:    No     Recent fever/chills:  No   Night sweats:   No  Current skin rash:  No   Recent hair loss:  No  Heat intolerance:  No   Cold intolerance:  No  Chest pain:   No   Palpitations:   No  Shortness of breath:  No   Wheezing:   No  Constipation:    No   Diarrhea:   No   Nausea:   No   Vomiting:   No   Kidney/side pain:  No   Back pain:   No  Frequent headaches:  No   Dizziness:     No  Leg swelling:   No   Calf pain:    No    Parents, brothers or sisters with history of kidney cancer:   No  Parents, brothers or sisters with history of bladder cancer: No  Father or brother with history of prostate cancer:  No

## 2019-07-15 ENCOUNTER — OFFICE VISIT (OUTPATIENT)
Dept: INTERNAL MEDICINE | Facility: OTHER | Age: 64
End: 2019-07-15
Attending: INTERNAL MEDICINE
Payer: COMMERCIAL

## 2019-07-15 VITALS
BODY MASS INDEX: 22.6 KG/M2 | TEMPERATURE: 98.4 F | HEART RATE: 68 BPM | WEIGHT: 152.6 LBS | RESPIRATION RATE: 18 BRPM | DIASTOLIC BLOOD PRESSURE: 73 MMHG | HEIGHT: 69 IN | SYSTOLIC BLOOD PRESSURE: 94 MMHG

## 2019-07-15 DIAGNOSIS — C61 PROSTATE CANCER (H): Primary | ICD-10-CM

## 2019-07-15 DIAGNOSIS — E78.5 HYPERLIPIDEMIA, UNSPECIFIED HYPERLIPIDEMIA TYPE: ICD-10-CM

## 2019-07-15 DIAGNOSIS — I70.0 ABDOMINAL AORTIC ATHEROSCLEROSIS (H): ICD-10-CM

## 2019-07-15 DIAGNOSIS — K21.00 GASTROESOPHAGEAL REFLUX DISEASE WITH ESOPHAGITIS: ICD-10-CM

## 2019-07-15 DIAGNOSIS — Z87.891 HISTORY OF TOBACCO ABUSE: ICD-10-CM

## 2019-07-15 PROCEDURE — 99204 OFFICE O/P NEW MOD 45 MIN: CPT | Performed by: INTERNAL MEDICINE

## 2019-07-15 PROCEDURE — G0463 HOSPITAL OUTPT CLINIC VISIT: HCPCS

## 2019-07-15 SDOH — HEALTH STABILITY: MENTAL HEALTH: HOW OFTEN DO YOU HAVE A DRINK CONTAINING ALCOHOL?: 2-3 TIMES A WEEK

## 2019-07-15 SDOH — HEALTH STABILITY: MENTAL HEALTH: HOW MANY STANDARD DRINKS CONTAINING ALCOHOL DO YOU HAVE ON A TYPICAL DAY?: 3 OR 4

## 2019-07-15 ASSESSMENT — ENCOUNTER SYMPTOMS
RHINORRHEA: 0
APPETITE CHANGE: 0
SPEECH DIFFICULTY: 0
JOINT SWELLING: 0
TREMORS: 0
WOUND: 0
BLOOD IN STOOL: 0
HEMATURIA: 0
SEIZURES: 0
NECK PAIN: 0
PALPITATIONS: 0
SORE THROAT: 0
HEADACHES: 0
NUMBNESS: 0
DIAPHORESIS: 0
NERVOUS/ANXIOUS: 0
TROUBLE SWALLOWING: 0
NAUSEA: 0
WEAKNESS: 0
SINUS PAIN: 0
ABDOMINAL PAIN: 0
HALLUCINATIONS: 0
AGITATION: 0
SHORTNESS OF BREATH: 0
ADENOPATHY: 0
LIGHT-HEADEDNESS: 0
DIARRHEA: 0
EYE PAIN: 0
BACK PAIN: 0
FATIGUE: 0
WHEEZING: 0
VOMITING: 0
SINUS PRESSURE: 0
CONSTIPATION: 0
BRUISES/BLEEDS EASILY: 0
DIZZINESS: 0
NECK STIFFNESS: 0
FEVER: 0
CHEST TIGHTNESS: 0
CHILLS: 0
EYE REDNESS: 0
UNEXPECTED WEIGHT CHANGE: 0
COUGH: 0
VOICE CHANGE: 0
FREQUENCY: 0
CONFUSION: 0
DIFFICULTY URINATING: 0
DYSURIA: 0
FLANK PAIN: 0
SLEEP DISTURBANCE: 0
ABDOMINAL DISTENTION: 0

## 2019-07-15 ASSESSMENT — MIFFLIN-ST. JEOR: SCORE: 1486.53

## 2019-07-15 ASSESSMENT — PATIENT HEALTH QUESTIONNAIRE - PHQ9: SUM OF ALL RESPONSES TO PHQ QUESTIONS 1-9: 0

## 2019-07-15 NOTE — PROGRESS NOTES
Chief Complaint:  This patient is here for a comprehensive review of their multiple medical problems, renewal of medications and update on necessary health maintenance issues.      HPI: This patient is here today to establish care.  He recently moved here from the [facility].  His ex-wife and his children live in this vicinity so he moved here to get closer to them.    He has a history of prostate cancer.  This was diagnosed on biopsy and is just being monitored.  He has no symptoms related to this.  His PSA level has been stable.    He has a history of hyperlipidemia.  He does have some aortic atherosclerosis seen on CT scanning of his abdomen.  He is on moderate intensity statin therapy that he tolerates without difficulty.  He does have a previous history of cigarette smoking as well.    He has reflux disease.  He has been taking some over-the-counter omeprazole but it is too expensive.  He wonders if I will give him prescription.    He has a history of anxiety, he is on BuSpar regularly.  This works quite well but it does sedate him a little bit.    Medications are reconciled.  Past medical history, past surgical history, family history and social histories are all reviewed and updated.  Immunizations are up-to-date.    Past Medical History:   Diagnosis Date     Abdominal aortic atherosclerosis (H)      Anxiety      Cyst of right kidney      Diverticulitis of intestine without perforation or abscess without bleeding     No Comments Provided     History of tobacco abuse      Hyperlipidemia      Left inguinal hernia      Prostate cancer (H)        Past Surgical History:   Procedure Laterality Date     ARTHROSCOPY SHOULDER RT/LT Left 1980s     BIOPSY PROSTATE TRANSRECTAL       COLONOSCOPY N/A 4/6/2018    F/U Colonoscopy 2023; tubular adenoma       Current Outpatient Medications   Medication Sig Dispense Refill     ASPIRIN ADULT LOW STRENGTH PO Take 81 mg by mouth daily       atorvastatin (LIPITOR) 20 MG  tablet Take 20 mg by mouth At Bedtime       busPIRone (BUSPAR) 15 MG tablet Take 15 mg by mouth 2 times daily as needed       fluticasone (FLONASE) 50 MCG/ACT spray Spray 1 spray into both nostrils daily        omeprazole (PRILOSEC) 20 MG DR capsule Take 1 capsule (20 mg) by mouth daily 90 capsule 3       No Known Allergies    Family History   Problem Relation Age of Onset     No Known Problems Other      Emphysema Mother      Other - See Comments Father         90, multiple medical problems     Other - See Comments Brother         MVA     Other - See Comments Brother         Multiple health problems     Heart Disease Brother         valve problems       Social History     Socioeconomic History     Marital status: Single     Spouse name: Not on file     Number of children: Not on file     Years of education: Not on file     Highest education level: Not on file   Occupational History     Not on file   Social Needs     Financial resource strain: Not on file     Food insecurity:     Worry: Not on file     Inability: Not on file     Transportation needs:     Medical: Not on file     Non-medical: Not on file   Tobacco Use     Smoking status: Former Smoker     Last attempt to quit: 10/30/2014     Years since quittin.7     Smokeless tobacco: Never Used   Substance and Sexual Activity     Alcohol use: Yes     Frequency: 2-3 times a week     Drinks per session: 3 or 4     Comment: occasional     Drug use: No     Comment: Drug use: No     Sexual activity: Not Currently   Lifestyle     Physical activity:     Days per week: Not on file     Minutes per session: Not on file     Stress: Not on file   Relationships     Social connections:     Talks on phone: Not on file     Gets together: Not on file     Attends Shinto service: Not on file     Active member of club or organization: Not on file     Attends meetings of clubs or organizations: Not on file     Relationship status: Not on file     Intimate partner violence:      Fear of current or ex partner: Not on file     Emotionally abused: Not on file     Physically abused: Not on file     Forced sexual activity: Not on file   Other Topics Concern     Not on file   Social History Narrative    Moved to [place] from [place], ex wife lives here.  Retired from [employer] work, [employer].       Review of Systems   Constitutional: Negative for appetite change, chills, diaphoresis, fatigue, fever and unexpected weight change.   HENT: Negative for ear pain, rhinorrhea, sinus pressure, sinus pain, sore throat, trouble swallowing and voice change.    Eyes: Negative for pain, redness and visual disturbance.   Respiratory: Negative for cough, chest tightness, shortness of breath and wheezing.    Cardiovascular: Negative for chest pain, palpitations and leg swelling.   Gastrointestinal: Negative for abdominal distention, abdominal pain, blood in stool, constipation, diarrhea, nausea and vomiting.   Endocrine: Negative for cold intolerance and heat intolerance.   Genitourinary: Negative for difficulty urinating, dysuria, flank pain, frequency and hematuria.   Musculoskeletal: Negative for back pain, joint swelling, neck pain and neck stiffness.   Skin: Negative for rash and wound.   Allergic/Immunologic: Negative for immunocompromised state.   Neurological: Negative for dizziness, tremors, seizures, syncope, speech difficulty, weakness, light-headedness, numbness and headaches.   Hematological: Negative for adenopathy. Does not bruise/bleed easily.   Psychiatric/Behavioral: Negative for agitation, behavioral problems, confusion, hallucinations and sleep disturbance. The patient is not nervous/anxious.        Physical Exam   Constitutional: He is oriented to person, place, and time. He appears well-developed and well-nourished. No distress.   HENT:   Head: Normocephalic.   Right Ear: External ear normal.   Left Ear: External ear normal.   Nose: Nose normal.   Mouth/Throat: Oropharynx is  clear and moist. No oropharyngeal exudate.   Eyes: Pupils are equal, round, and reactive to light. Conjunctivae are normal.   Neck: Normal range of motion. Neck supple. Normal carotid pulses and no JVD present. Carotid bruit is not present. No tracheal deviation present. No thyromegaly present.   Cardiovascular: Normal rate, regular rhythm and normal heart sounds. Exam reveals no gallop and no friction rub.   No murmur heard.  Pulmonary/Chest: Effort normal and breath sounds normal. No stridor. No respiratory distress. He has no wheezes. He has no rales.   Abdominal: Soft. Bowel sounds are normal. He exhibits no distension and no mass. There is no tenderness. There is no rebound and no guarding. No hernia.   Musculoskeletal: Normal range of motion. He exhibits no edema.   Lymphadenopathy:     He has no cervical adenopathy.   Neurological: He is alert and oriented to person, place, and time. He displays normal reflexes. No cranial nerve deficit or sensory deficit. He exhibits normal muscle tone. Coordination normal.   Skin: Skin is warm and dry. No rash noted. He is not diaphoretic.   Psychiatric: He has a normal mood and affect.   Nursing note and vitals reviewed.      Assessment:      ICD-10-CM    1. Prostate cancer on active surveillance since 2013 C61    2. Abdominal aortic atherosclerosis (H) I70.0    3. Hyperlipidemia, unspecified hyperlipidemia type E78.5    4. History of tobacco abuse Z87.891    5. Gastroesophageal reflux disease with esophagitis K21.0 omeprazole (PRILOSEC) 20 MG DR capsule        Plan: This patient appears to be stable at this time.  Medications will continue without change.  I did give him prescription for omeprazole so he would not have to buy the over-the-counter.  Everything appears to be up-to-date although he will be due for lab work in January.  This is when he will be back in to see urology for his prostate cancer.  I will want to discuss with him CT scanning of the chest for lung  cancer at that time.

## 2019-07-15 NOTE — NURSING NOTE
"The patient is here today to be seen to establish care and discuss his medications along with his cholesterol.  Loreto Crawford LPN on 7/15/2019 at 3:11 PM  Chief Complaint   Patient presents with     Establish Care       Initial BP 94/73 (BP Location: Right arm, Patient Position: Sitting, Cuff Size: Adult Regular)   Pulse 68   Temp 98.4  F (36.9  C) (Tympanic)   Resp 18   Ht 1.759 m (5' 9.25\")   Wt 69.2 kg (152 lb 9.6 oz)   BMI 22.37 kg/m   Estimated body mass index is 22.37 kg/m  as calculated from the following:    Height as of this encounter: 1.759 m (5' 9.25\").    Weight as of this encounter: 69.2 kg (152 lb 9.6 oz).  Medication Reconciliation: complete    Loreto Crawford LPN    "

## 2019-08-06 DIAGNOSIS — F41.9 ANXIETY: Primary | ICD-10-CM

## 2019-08-06 RX ORDER — BUSPIRONE HYDROCHLORIDE 15 MG/1
15 TABLET ORAL 2 TIMES DAILY PRN
Qty: 180 TABLET | Refills: 3 | Status: SHIPPED | OUTPATIENT
Start: 2019-08-06 | End: 2020-10-06

## 2019-08-06 NOTE — TELEPHONE ENCOUNTER
Fax from [facility] stating that pt is requesting new RX.  Yulisa Morton LPN ....................  8/6/2019   10:10 AM

## 2019-08-06 NOTE — TELEPHONE ENCOUNTER
"Requested Prescriptions   Pending Prescriptions Disp Refills     busPIRone (BUSPAR) 15 MG tablet 180 tablet 0     Sig: Take 1 tablet (15 mg) by mouth 2 times daily as needed       Atypical Antidepressants Protocol Passed - 8/6/2019 10:10 AM        Passed - Recent (12 mo) or future (30 days) visit within the authorizing provider's specialty     Patient had office visit in the last 12 months or has a visit in the next 30 days with authorizing provider or within the authorizing provider's specialty.  See \"Patient Info\" tab in inbasket, or \"Choose Columns\" in Meds & Orders section of the refill encounter.              Passed - Medication active on med list        Passed - Patient is age 18 or older        Last OV 7/15/19 per MD anxiety well controlled with Buspar. Prescription approved per [facility] [place] [facility].  Bernarda Curtis RN on 8/6/2019 at 11:22 AM   "

## 2019-09-13 ENCOUNTER — OFFICE VISIT (OUTPATIENT)
Dept: INTERNAL MEDICINE | Facility: OTHER | Age: 64
End: 2019-09-13
Attending: INTERNAL MEDICINE
Payer: COMMERCIAL

## 2019-09-13 VITALS
DIASTOLIC BLOOD PRESSURE: 88 MMHG | BODY MASS INDEX: 22.7 KG/M2 | WEIGHT: 154.8 LBS | SYSTOLIC BLOOD PRESSURE: 110 MMHG | HEART RATE: 68 BPM | RESPIRATION RATE: 18 BRPM

## 2019-09-13 DIAGNOSIS — S46.812A STRAIN OF LEFT TRAPEZIUS MUSCLE, INITIAL ENCOUNTER: Primary | ICD-10-CM

## 2019-09-13 PROCEDURE — G0463 HOSPITAL OUTPT CLINIC VISIT: HCPCS

## 2019-09-13 PROCEDURE — 99213 OFFICE O/P EST LOW 20 MIN: CPT | Performed by: INTERNAL MEDICINE

## 2019-09-13 ASSESSMENT — ENCOUNTER SYMPTOMS
CONSTITUTIONAL NEGATIVE: 1
ENDOCRINE NEGATIVE: 1
EYES NEGATIVE: 1
ALLERGIC/IMMUNOLOGIC NEGATIVE: 1

## 2019-09-13 NOTE — PROGRESS NOTES
Chief Complaint:  Neck pain.    HPI: He is here today with a complaint of neck pain.  Its been sore for upwards of 2 months.  He is tried Tylenol as well as heat with no improvement.  There was no injury.  He does not use a different pillow or anything else that is different that might cause this.  He is here today to see what different he could try or do to get improvement with this discomfort.    Past Medical History:   Diagnosis Date     Abdominal aortic atherosclerosis (H)      Anxiety      Cyst of right kidney      Diverticulitis of intestine without perforation or abscess without bleeding     No Comments Provided     History of tobacco abuse      Hyperlipidemia      Left inguinal hernia      Prostate cancer (H)        Past Surgical History:   Procedure Laterality Date     ARTHROSCOPY SHOULDER RT/LT Left 1980s     BIOPSY PROSTATE TRANSRECTAL       COLONOSCOPY N/A 4/6/2018    F/U Colonoscopy 2023; tubular adenoma       No Known Allergies    Current Outpatient Medications   Medication Sig Dispense Refill     ASPIRIN ADULT LOW STRENGTH PO Take 81 mg by mouth daily       atorvastatin (LIPITOR) 20 MG tablet Take 20 mg by mouth At Bedtime       busPIRone (BUSPAR) 15 MG tablet Take 1 tablet (15 mg) by mouth 2 times daily as needed (for anxiety) 180 tablet 3     fluticasone (FLONASE) 50 MCG/ACT spray Spray 1 spray into both nostrils daily        omeprazole (PRILOSEC) 20 MG DR capsule Take 1 capsule (20 mg) by mouth daily 90 capsule 3       Review of Systems   Constitutional: Negative.    Eyes: Negative.    Endocrine: Negative.    Allergic/Immunologic: Negative.        Physical Exam   Constitutional: He appears well-developed and well-nourished. No distress.   Musculoskeletal:   He has tenderness in the trapezius muscle on the left.  Range of motion in the neck was fairly well-preserved and there is no actual cervical discomfort.  Remainder the exam is normal.   Skin: He is not diaphoretic.   Nursing note and vitals  reviewed.      Assessment:        ICD-10-CM    1. Strain of left trapezius muscle, initial encounter S46.812A        Plan: He has tried conservative measures and has not improved.  Referral to physical therapy is ordered.  I am not keen on putting him on anti-inflammatory medications due to his GI problems.  Follow-up if not improving.

## 2019-09-13 NOTE — NURSING NOTE
"The patient is here today to be seen for left sided neck stiffness.  Loreto Crawford LPN on 9/13/2019 at 2:29 PM  Chief Complaint   Patient presents with     neck stiffness       Initial /88 (BP Location: Right arm, Patient Position: Sitting, Cuff Size: Adult Regular)   Pulse 68   Resp 18   Wt 70.2 kg (154 lb 12.8 oz)   BMI 22.70 kg/m   Estimated body mass index is 22.7 kg/m  as calculated from the following:    Height as of 7/15/19: 1.759 m (5' 9.25\").    Weight as of this encounter: 70.2 kg (154 lb 12.8 oz).  Medication Reconciliation: complete    Loreto Crawford LPN    "

## 2019-09-18 ENCOUNTER — HOSPITAL ENCOUNTER (OUTPATIENT)
Dept: PHYSICAL THERAPY | Facility: OTHER | Age: 64
Setting detail: THERAPIES SERIES
End: 2019-09-18
Attending: INTERNAL MEDICINE
Payer: COMMERCIAL

## 2019-09-18 DIAGNOSIS — S46.812A STRAIN OF LEFT TRAPEZIUS MUSCLE, INITIAL ENCOUNTER: ICD-10-CM

## 2019-09-18 PROCEDURE — 97161 PT EVAL LOW COMPLEX 20 MIN: CPT | Mod: GP | Performed by: PHYSICAL THERAPIST

## 2019-09-18 PROCEDURE — 97035 APP MDLTY 1+ULTRASOUND EA 15: CPT | Mod: GP | Performed by: PHYSICAL THERAPIST

## 2019-09-18 PROCEDURE — 97140 MANUAL THERAPY 1/> REGIONS: CPT | Mod: GP | Performed by: PHYSICAL THERAPIST

## 2019-09-18 NOTE — PROGRESS NOTES
"   09/18/19 1000   General Information   Type of Visit Initial OP Ortho PT Evaluation   Start of Care Date 09/18/19   Referring Physician Dr Billingsley   Patient/Family Goals Statement reduce pain   Orders Evaluate and Treat   Date of Order 09/13/19   Certification Required? No   Medical Diagnosis Strain of left trapezius muscle, initial encounter S46.547R    Surgical/Medical history reviewed Yes   Precautions/Limitations no known precautions/limitations   Weight-Bearing Status - LUE full weight-bearing   Weight-Bearing Status - RUE full weight-bearing   General Information Comments please refer to pt's medical record for any additional information   Body Part(s)   Body Part(s) Cervical Spine   Presentation and Etiology   Pertinent history of current problem (include personal factors and/or comorbidities that impact the POC) Tightness in his lower neck area that has been going on all summer long. No specific ANAY. \"Just feels like a stif neck\". No jolting pain, just tightness & stiffness.    Impairments A. Pain;D. Decreased ROM;E. Decreased flexibility  (decreased ROM isn't all the time)   Functional Limitations perform activities of daily living   Symptom Location tightness in lower L neck region   How/Where did it occur From insidious onset   Onset date of current episode/exacerbation 06/18/19   Chronicity Chronic   Pain rating (0-10 point scale) Denies pain   Pain quality C. Aching   Frequency of pain/symptoms B. Intermittent   Pain/symptoms exacerbated by G. Certain positions   Pain/symptoms eased by E. Changing positions;F. Certain positions   Progression of symptoms since onset: Unchanged   Prior Level of Function   Prior Level of Function-Mobility Ind   Prior Level of Function-ADLs Ind   Current Level of Function   Current Community Support Family/friend caregiver   Patient role/employment history F. Retired   [facility]   Current equipment-Gait/Locomotion None   Current equipment-ADL None "   Fall Risk Screen   Fall screen completed by PT   Have you fallen 2 or more times in the past year? No   Have you fallen and had an injury in the past year? No   Is patient a fall risk? No   Abuse Screen (yes response referral indicated)   Feels Unsafe at Home or Work/School no   Feels Threatened by Someone no   Does Anyone Try to Keep You From Having Contact with Others or Doing Things Outside Your Home? no   Physical Signs of Abuse Present no   Functional Scales   Functional Scales Other   Other Scales  PSFS   Cervical Spine   Observation pt is pleasant and in no acute distress   Posture slight forward head and rounded shoulders   Shoulder AROM Screen Normal shoulder ROM bilat   Cervical/Thoracic/Shoulder ROM Comments Normal cervical motion, some discomfort noted at end range L rotation and R side bend   Shoulder/Wrist/Hand Strength Comments No UE myotome deficits   Spurling Test NEG   Cervical Distraction Test NEG   Dermatome/Sensory Testing No UE dermatome deficits   Planned Therapy Interventions   Planned Therapy Interventions joint mobilization;manual therapy;motor coordination training;neuromuscular re-education;strengthening;stretching   Planned Modality Interventions   Planned Modality Interventions Cryotherapy;Electrical stimulation;Ultrasound;Traction   Clinical Impression   Criteria for Skilled Therapeutic Interventions Met yes, treatment indicated   PT Diagnosis neck/trap strain   Influenced by the following impairments occasional neck pain   Functional limitations due to impairments occasional tightness causing a reduction in cervical ROM   Clinical Presentation Stable/Uncomplicated   Clinical Decision Making (Complexity) Low complexity   Therapy Frequency 2 times/Week   Predicted Duration of Therapy Intervention (days/wks) 8 weeks   Risk & Benefits of therapy have been explained Yes   Patient, Family & other staff in agreement with plan of care Yes   Education Assessment   Preferred Learning Style  Listening;Reading;Demonstration;Pictures/video   Barriers to Learning No barriers   ORTHO GOALS   PT Ortho Eval Goals 1;2;3   Ortho Goal 1   Goal Identifier Pain   Goal Description Pt to have no increase in pain with day to day ADLs    Target Date 11/13/19   Ortho Goal 2   Goal Identifier Driving   Goal Description Pt to have zero tightness in his lower neck to allow discomfort free-driving   Target Date 11/13/19   Ortho Goal 3   Goal Identifier Sleeping   Goal Description Pt to wake up and have no reported tightness in his lower neck for improved wellness & sleep quality   Target Date 11/13/19   Total Evaluation Time   PT Elida, Low Complexity Minutes (24307) 15

## 2019-09-20 ENCOUNTER — HOSPITAL ENCOUNTER (OUTPATIENT)
Dept: PHYSICAL THERAPY | Facility: OTHER | Age: 64
Setting detail: THERAPIES SERIES
End: 2019-09-20
Attending: INTERNAL MEDICINE
Payer: COMMERCIAL

## 2019-09-20 PROCEDURE — 97140 MANUAL THERAPY 1/> REGIONS: CPT | Mod: GP

## 2019-09-20 PROCEDURE — 97035 APP MDLTY 1+ULTRASOUND EA 15: CPT | Mod: GP

## 2019-09-26 ENCOUNTER — HOSPITAL ENCOUNTER (OUTPATIENT)
Dept: PHYSICAL THERAPY | Facility: OTHER | Age: 64
Setting detail: THERAPIES SERIES
End: 2019-09-26
Attending: INTERNAL MEDICINE
Payer: COMMERCIAL

## 2019-09-26 PROCEDURE — 97035 APP MDLTY 1+ULTRASOUND EA 15: CPT | Mod: GP

## 2019-09-26 PROCEDURE — 97140 MANUAL THERAPY 1/> REGIONS: CPT | Mod: GP

## 2019-10-02 ENCOUNTER — HOSPITAL ENCOUNTER (OUTPATIENT)
Dept: PHYSICAL THERAPY | Facility: OTHER | Age: 64
Setting detail: THERAPIES SERIES
End: 2019-10-02
Attending: INTERNAL MEDICINE
Payer: COMMERCIAL

## 2019-10-02 PROCEDURE — 97035 APP MDLTY 1+ULTRASOUND EA 15: CPT | Mod: GP

## 2019-10-02 PROCEDURE — 97140 MANUAL THERAPY 1/> REGIONS: CPT | Mod: GP

## 2019-10-08 ENCOUNTER — HOSPITAL ENCOUNTER (OUTPATIENT)
Dept: PHYSICAL THERAPY | Facility: OTHER | Age: 64
Setting detail: THERAPIES SERIES
End: 2019-10-08
Attending: INTERNAL MEDICINE
Payer: COMMERCIAL

## 2019-10-08 PROCEDURE — 97035 APP MDLTY 1+ULTRASOUND EA 15: CPT | Mod: GP

## 2019-10-08 PROCEDURE — 97140 MANUAL THERAPY 1/> REGIONS: CPT | Mod: GP

## 2019-10-15 ENCOUNTER — HOSPITAL ENCOUNTER (OUTPATIENT)
Dept: PHYSICAL THERAPY | Facility: OTHER | Age: 64
Setting detail: THERAPIES SERIES
End: 2019-10-15
Attending: INTERNAL MEDICINE
Payer: COMMERCIAL

## 2019-10-15 PROCEDURE — 97140 MANUAL THERAPY 1/> REGIONS: CPT | Mod: GP | Performed by: PHYSICAL THERAPIST

## 2019-10-15 PROCEDURE — 97035 APP MDLTY 1+ULTRASOUND EA 15: CPT | Mod: GP | Performed by: PHYSICAL THERAPIST

## 2019-10-20 DIAGNOSIS — J30.1 ALLERGIC RHINITIS DUE TO POLLEN, UNSPECIFIED SEASONALITY: Primary | ICD-10-CM

## 2019-10-22 RX ORDER — FLUTICASONE PROPIONATE 50 MCG
SPRAY, SUSPENSION (ML) NASAL
Qty: 16 ML | Refills: 1 | Status: SHIPPED | OUTPATIENT
Start: 2019-10-22 | End: 2020-01-06

## 2019-10-22 NOTE — TELEPHONE ENCOUNTER
Medication refill request for Flonase, medication is not associated with a dx and nothing on his problem list present or past matches for me to associate it with.  Sending to pcp to add dx. Unable to complete prescription refill per RN Medication Refill Policy. Soha Nevarez RN 10/22/2019 9:54 AM

## 2019-10-24 ENCOUNTER — HOSPITAL ENCOUNTER (OUTPATIENT)
Dept: PHYSICAL THERAPY | Facility: OTHER | Age: 64
Setting detail: THERAPIES SERIES
End: 2019-10-24
Attending: INTERNAL MEDICINE
Payer: COMMERCIAL

## 2019-10-24 PROCEDURE — 97035 APP MDLTY 1+ULTRASOUND EA 15: CPT | Mod: GP | Performed by: PHYSICAL THERAPIST

## 2019-10-24 PROCEDURE — 97140 MANUAL THERAPY 1/> REGIONS: CPT | Mod: GP | Performed by: PHYSICAL THERAPIST

## 2019-10-28 ENCOUNTER — HOSPITAL ENCOUNTER (OUTPATIENT)
Dept: PHYSICAL THERAPY | Facility: OTHER | Age: 64
Setting detail: THERAPIES SERIES
End: 2019-10-28
Attending: INTERNAL MEDICINE
Payer: COMMERCIAL

## 2019-10-28 PROCEDURE — 97140 MANUAL THERAPY 1/> REGIONS: CPT | Mod: GP | Performed by: PHYSICAL THERAPIST

## 2019-10-28 PROCEDURE — 97035 APP MDLTY 1+ULTRASOUND EA 15: CPT | Mod: GP | Performed by: PHYSICAL THERAPIST

## 2019-10-30 ENCOUNTER — HOSPITAL ENCOUNTER (OUTPATIENT)
Dept: PHYSICAL THERAPY | Facility: OTHER | Age: 64
Setting detail: THERAPIES SERIES
End: 2019-10-30
Attending: INTERNAL MEDICINE
Payer: COMMERCIAL

## 2019-10-30 PROCEDURE — 97140 MANUAL THERAPY 1/> REGIONS: CPT | Mod: GP | Performed by: PHYSICAL THERAPIST

## 2019-10-30 PROCEDURE — 97035 APP MDLTY 1+ULTRASOUND EA 15: CPT | Mod: GP | Performed by: PHYSICAL THERAPIST

## 2019-12-16 NOTE — PROGRESS NOTES
Outpatient Physical Therapy Discharge Note     Patient: Josué Álvarez  : 1955    Beginning/End Dates:  19 to 10/30/19    Referring Provider: Dr Billingsley    Therapy Diagnosis: Strain of left trapezius muscle, initial encounter S46.812A      Plan:  Discharge from therapy.    Discharge:   Pt has not returned to physical therapy after a trial of self mgmt techniques indicating that he is doing well with HEP. Please refer to pt's chart for most recent information on objective measurements, goals or any additional information. This is an unplanned DC    Reason for Discharge: Patient chooses to discontinue therapy.  Patient has not scheduled further appointments.    Discharge Plan: Patient to continue home program.

## 2020-01-02 ENCOUNTER — TELEPHONE (OUTPATIENT)
Dept: INTERNAL MEDICINE | Facility: OTHER | Age: 65
End: 2020-01-02

## 2020-01-02 DIAGNOSIS — C61 PROSTATE CANCER (H): ICD-10-CM

## 2020-01-02 DIAGNOSIS — E78.5 HYPERLIPIDEMIA, UNSPECIFIED HYPERLIPIDEMIA TYPE: Primary | ICD-10-CM

## 2020-01-02 NOTE — TELEPHONE ENCOUNTER
The patient is coming in for annual labs and a psa check. Please place lab orders.  Loreto Crawford LPN on 1/2/2020 at 11:43 AM

## 2020-01-02 NOTE — TELEPHONE ENCOUNTER
Patient coming for lab only appointment on 1/6. Please place orders if applicable.  Thanks,  Lab

## 2020-01-06 ENCOUNTER — OFFICE VISIT (OUTPATIENT)
Dept: INTERNAL MEDICINE | Facility: OTHER | Age: 65
End: 2020-01-06
Attending: INTERNAL MEDICINE
Payer: COMMERCIAL

## 2020-01-06 ENCOUNTER — OFFICE VISIT (OUTPATIENT)
Dept: UROLOGY | Facility: OTHER | Age: 65
End: 2020-01-06
Attending: UROLOGY
Payer: COMMERCIAL

## 2020-01-06 VITALS
WEIGHT: 154 LBS | HEART RATE: 70 BPM | DIASTOLIC BLOOD PRESSURE: 86 MMHG | BODY MASS INDEX: 22.58 KG/M2 | RESPIRATION RATE: 16 BRPM | SYSTOLIC BLOOD PRESSURE: 120 MMHG

## 2020-01-06 VITALS
BODY MASS INDEX: 22.05 KG/M2 | TEMPERATURE: 98.6 F | SYSTOLIC BLOOD PRESSURE: 118 MMHG | HEART RATE: 72 BPM | RESPIRATION RATE: 18 BRPM | DIASTOLIC BLOOD PRESSURE: 76 MMHG | HEIGHT: 70 IN | WEIGHT: 154 LBS

## 2020-01-06 DIAGNOSIS — C61 PROSTATE CANCER (H): ICD-10-CM

## 2020-01-06 DIAGNOSIS — Z87.891 PERSONAL HISTORY OF TOBACCO USE: ICD-10-CM

## 2020-01-06 DIAGNOSIS — E78.5 HYPERLIPIDEMIA, UNSPECIFIED HYPERLIPIDEMIA TYPE: Primary | ICD-10-CM

## 2020-01-06 DIAGNOSIS — E78.5 HYPERLIPIDEMIA, UNSPECIFIED HYPERLIPIDEMIA TYPE: ICD-10-CM

## 2020-01-06 DIAGNOSIS — J30.1 ALLERGIC RHINITIS DUE TO POLLEN, UNSPECIFIED SEASONALITY: ICD-10-CM

## 2020-01-06 DIAGNOSIS — K21.00 GASTROESOPHAGEAL REFLUX DISEASE WITH ESOPHAGITIS: ICD-10-CM

## 2020-01-06 DIAGNOSIS — I70.0 ABDOMINAL AORTIC ATHEROSCLEROSIS (H): ICD-10-CM

## 2020-01-06 DIAGNOSIS — C61 PROSTATE CANCER (H): Primary | ICD-10-CM

## 2020-01-06 LAB
ALBUMIN SERPL-MCNC: 4.9 G/DL (ref 3.5–5.7)
ALP SERPL-CCNC: 52 U/L (ref 34–104)
ALT SERPL W P-5'-P-CCNC: 35 U/L (ref 7–52)
ANION GAP SERPL CALCULATED.3IONS-SCNC: 9 MMOL/L (ref 3–14)
AST SERPL W P-5'-P-CCNC: 28 U/L (ref 13–39)
BILIRUB SERPL-MCNC: 0.9 MG/DL (ref 0.3–1)
BUN SERPL-MCNC: 18 MG/DL (ref 7–25)
CALCIUM SERPL-MCNC: 9.9 MG/DL (ref 8.6–10.3)
CHLORIDE SERPL-SCNC: 101 MMOL/L (ref 98–107)
CHOLEST SERPL-MCNC: 196 MG/DL
CO2 SERPL-SCNC: 30 MMOL/L (ref 21–31)
CREAT SERPL-MCNC: 0.88 MG/DL (ref 0.7–1.3)
GFR SERPL CREATININE-BSD FRML MDRD: 87 ML/MIN/{1.73_M2}
GLUCOSE SERPL-MCNC: 86 MG/DL (ref 70–105)
HDLC SERPL-MCNC: 67 MG/DL (ref 23–92)
LDLC SERPL CALC-MCNC: 97 MG/DL
NONHDLC SERPL-MCNC: 129 MG/DL
POTASSIUM SERPL-SCNC: 4.2 MMOL/L (ref 3.5–5.1)
PROT SERPL-MCNC: 7.9 G/DL (ref 6.4–8.9)
PSA SERPL-MCNC: 12.01 NG/ML
SODIUM SERPL-SCNC: 140 MMOL/L (ref 134–144)
TRIGL SERPL-MCNC: 158 MG/DL

## 2020-01-06 PROCEDURE — 84153 ASSAY OF PSA TOTAL: CPT | Mod: ZL | Performed by: INTERNAL MEDICINE

## 2020-01-06 PROCEDURE — G0463 HOSPITAL OUTPT CLINIC VISIT: HCPCS

## 2020-01-06 PROCEDURE — 36415 COLL VENOUS BLD VENIPUNCTURE: CPT | Mod: ZL | Performed by: INTERNAL MEDICINE

## 2020-01-06 PROCEDURE — 99396 PREV VISIT EST AGE 40-64: CPT | Performed by: INTERNAL MEDICINE

## 2020-01-06 PROCEDURE — 80053 COMPREHEN METABOLIC PANEL: CPT | Mod: ZL | Performed by: INTERNAL MEDICINE

## 2020-01-06 PROCEDURE — 99213 OFFICE O/P EST LOW 20 MIN: CPT | Mod: 25 | Performed by: UROLOGY

## 2020-01-06 PROCEDURE — G0296 VISIT TO DETERM LDCT ELIG: HCPCS | Performed by: INTERNAL MEDICINE

## 2020-01-06 PROCEDURE — G0463 HOSPITAL OUTPT CLINIC VISIT: HCPCS | Mod: 25

## 2020-01-06 PROCEDURE — 80061 LIPID PANEL: CPT | Mod: ZL | Performed by: INTERNAL MEDICINE

## 2020-01-06 RX ORDER — ATORVASTATIN CALCIUM 20 MG/1
20 TABLET, FILM COATED ORAL AT BEDTIME
Qty: 90 TABLET | Refills: 3 | Status: SHIPPED | OUTPATIENT
Start: 2020-01-06 | End: 2021-01-29

## 2020-01-06 RX ORDER — FLUTICASONE PROPIONATE 50 MCG
1 SPRAY, SUSPENSION (ML) NASAL DAILY
Qty: 16 ML | Refills: 1 | COMMUNITY
Start: 2020-01-06 | End: 2020-02-05

## 2020-01-06 ASSESSMENT — ENCOUNTER SYMPTOMS
COUGH: 0
CHILLS: 0
WEAKNESS: 0
FEVER: 0
VOICE CHANGE: 0
TROUBLE SWALLOWING: 0
SINUS PAIN: 0
BACK PAIN: 0
SINUS PRESSURE: 0
ABDOMINAL DISTENTION: 0
DIFFICULTY URINATING: 0
BRUISES/BLEEDS EASILY: 0
ROS GI COMMENTS: GERD
NECK STIFFNESS: 0
VOMITING: 0
WOUND: 0
ADENOPATHY: 0
SORE THROAT: 0
SEIZURES: 0
ABDOMINAL PAIN: 0
AGITATION: 0
SLEEP DISTURBANCE: 0
LIGHT-HEADEDNESS: 0
NUMBNESS: 0
DIZZINESS: 0
DIAPHORESIS: 0
APPETITE CHANGE: 0
DIARRHEA: 0
PALPITATIONS: 0
RHINORRHEA: 0
NECK PAIN: 0
EYE PAIN: 0
CHEST TIGHTNESS: 0
NERVOUS/ANXIOUS: 0
CONFUSION: 0
FREQUENCY: 0
DYSURIA: 0
FATIGUE: 0
SPEECH DIFFICULTY: 0
UNEXPECTED WEIGHT CHANGE: 0
EYE REDNESS: 0
JOINT SWELLING: 0
CONSTIPATION: 0
HEMATURIA: 0
HALLUCINATIONS: 0
BLOOD IN STOOL: 0
FLANK PAIN: 0
TREMORS: 0
NAUSEA: 0
HEADACHES: 0
WHEEZING: 0
SHORTNESS OF BREATH: 0

## 2020-01-06 ASSESSMENT — MIFFLIN-ST. JEOR: SCORE: 1499.79

## 2020-01-06 ASSESSMENT — PAIN SCALES - GENERAL: PAINLEVEL: NO PAIN (0)

## 2020-01-06 NOTE — NURSING NOTE
"The patient is here today to be seen for a refill on her medications.  Loreto Crawford LPN on 1/6/2020 at 2:44 PM  Chief Complaint   Patient presents with     Recheck Medication       Initial /76 (BP Location: Right arm, Patient Position: Sitting, Cuff Size: Adult Regular)   Pulse 72   Temp 98.6  F (37  C) (Tympanic)   Resp 18   Ht 1.778 m (5' 10\")   Wt 69.9 kg (154 lb)   BMI 22.10 kg/m   Estimated body mass index is 22.1 kg/m  as calculated from the following:    Height as of this encounter: 1.778 m (5' 10\").    Weight as of this encounter: 69.9 kg (154 lb).  Medication Reconciliation: complete    Loreto Crawford LPN    "

## 2020-01-06 NOTE — PROGRESS NOTES
Chief Complaint:  This patient is here for a comprehensive review of their multiple medical problems, renewal of medications and update on necessary health maintenance issues.      HPI: This patient is here today for complete evaluation and a review of his chronic medical problems.  He has a history of prostate cancer.  He has a PSA that is slowly rising.  This is being monitored with urology.  He will have a PSA done again in 6 months.  He is not having any urinary symptoms at all.    He has a history of hyperlipidemia.  He is on moderate intensity statin therapy.  He does have some vascular atherosclerosis as has been seen on previous imaging.  He seems to tolerate his statin without difficulty.  He does not have any other known vascular disease.    He has a history of a tubular adenoma of the colon.  He is up-to-date with colonoscopy, he will be due again in 2023.  He has a history of anxiety.  He takes BuSpar on an as-needed basis.    He does have a previous history of smoking.  He quit about 5 years ago.  He has about a 35-pack-year history of smoking.  I talked to him about screening for lung cancer and he will consider this.  He does not want to do it at this time.    He has been having a little bit of reflux problems.  The omeprazole seems to be taking care of this reasonably well.  Medications are reconciled.  Past medical history, past surgical history, family history and social histories are reviewed and updated.  Immunizations are up-to-date.    Past Medical History:   Diagnosis Date     Abdominal aortic atherosclerosis (H)      Anxiety      Cyst of right kidney      Diverticulitis of intestine without perforation or abscess without bleeding     No Comments Provided     History of tobacco abuse      Hyperlipidemia      Left inguinal hernia      Prostate cancer (H)      Tubular adenoma        Past Surgical History:   Procedure Laterality Date     ARTHROSCOPY SHOULDER RT/LT Left 1980s     BIOPSY PROSTATE  TRANSRECTAL  10/2016     COLONOSCOPY N/A 2018    F/U Colonoscopy ; tubular adenoma       Current Outpatient Medications   Medication Sig Dispense Refill     ASPIRIN ADULT LOW STRENGTH PO Take 81 mg by mouth daily       atorvastatin (LIPITOR) 20 MG tablet Take 1 tablet (20 mg) by mouth At Bedtime 90 tablet 3     busPIRone (BUSPAR) 15 MG tablet Take 1 tablet (15 mg) by mouth 2 times daily as needed (for anxiety) 180 tablet 3     fluticasone (FLONASE) 50 MCG/ACT nasal spray Spray 1 spray into both nostrils daily 16 mL 1     omeprazole (PRILOSEC) 20 MG DR capsule Take 1 capsule (20 mg) by mouth daily 90 capsule 3       No Known Allergies    Family History   Problem Relation Age of Onset     No Known Problems Other      Emphysema Mother      Other - See Comments Father         90, multiple medical problems     Other - See Comments Brother         MVA     Other - See Comments Brother         Multiple health problems     Heart Disease Brother         valve problems       Social History     Socioeconomic History     Marital status: Single     Spouse name: Not on file     Number of children: Not on file     Years of education: Not on file     Highest education level: Not on file   Occupational History     Not on file   Social Needs     Financial resource strain: Not on file     Food insecurity:     Worry: Not on file     Inability: Not on file     Transportation needs:     Medical: Not on file     Non-medical: Not on file   Tobacco Use     Smoking status: Former Smoker     Last attempt to quit: 10/30/2014     Years since quittin.1     Smokeless tobacco: Never Used   Substance and Sexual Activity     Alcohol use: Yes     Frequency: 2-3 times a week     Drinks per session: 3 or 4     Comment: occasional     Drug use: No     Comment: Drug use: No     Sexual activity: Not Currently   Lifestyle     Physical activity:     Days per week: Not on file     Minutes per session: Not on file     Stress: Not on file    Relationships     Social connections:     Talks on phone: Not on file     Gets together: Not on file     Attends Rastafari service: Not on file     Active member of club or organization: Not on file     Attends meetings of clubs or organizations: Not on file     Relationship status: Not on file     Intimate partner violence:     Fear of current or ex partner: Not on file     Emotionally abused: Not on file     Physically abused: Not on file     Forced sexual activity: Not on file   Other Topics Concern     Not on file   Social History Narrative    Moved to [facility] from [place], ex wife lives here.  Retired from [employer], [employer].       Review of Systems   Constitutional: Negative for appetite change, chills, diaphoresis, fatigue, fever and unexpected weight change.   HENT: Negative for ear pain, rhinorrhea, sinus pressure, sinus pain, sore throat, trouble swallowing and voice change.    Eyes: Negative for pain, redness and visual disturbance.   Respiratory: Negative for cough, chest tightness, shortness of breath and wheezing.    Cardiovascular: Negative for chest pain, palpitations and leg swelling.   Gastrointestinal: Negative for abdominal distention, abdominal pain, blood in stool, constipation, diarrhea, nausea and vomiting.        GERD   Endocrine: Negative for cold intolerance and heat intolerance.   Genitourinary: Negative for difficulty urinating, dysuria, flank pain, frequency and hematuria.   Musculoskeletal: Negative for back pain, joint swelling, neck pain and neck stiffness.   Skin: Negative for rash and wound.   Allergic/Immunologic: Negative for immunocompromised state.   Neurological: Negative for dizziness, tremors, seizures, syncope, speech difficulty, weakness, light-headedness, numbness and headaches.   Hematological: Negative for adenopathy. Does not bruise/bleed easily.   Psychiatric/Behavioral: Negative for agitation, behavioral problems, confusion, hallucinations and  sleep disturbance. The patient is not nervous/anxious.        Physical Exam  Vitals signs and nursing note reviewed.   Constitutional:       General: He is not in acute distress.     Appearance: He is well-developed. He is not diaphoretic.   HENT:      Head: Normocephalic.      Right Ear: Tympanic membrane, ear canal and external ear normal.      Left Ear: Tympanic membrane, ear canal and external ear normal.      Nose: Nose normal.      Mouth/Throat:      Pharynx: No oropharyngeal exudate.   Eyes:      Conjunctiva/sclera: Conjunctivae normal.      Pupils: Pupils are equal, round, and reactive to light.   Neck:      Musculoskeletal: Normal range of motion and neck supple.      Thyroid: No thyroid mass or thyromegaly.      Vascular: Normal carotid pulses. No carotid bruit or JVD.      Trachea: No tracheal deviation.   Cardiovascular:      Rate and Rhythm: Normal rate and regular rhythm.      Heart sounds: Normal heart sounds. No murmur. No friction rub. No gallop.    Pulmonary:      Effort: Pulmonary effort is normal. No respiratory distress.      Breath sounds: Normal breath sounds. No stridor. No decreased breath sounds, wheezing, rhonchi or rales.   Abdominal:      General: Bowel sounds are normal. There is no distension.      Palpations: Abdomen is soft. There is no mass.      Tenderness: There is no abdominal tenderness. There is no guarding or rebound.      Hernia: No hernia is present.   Musculoskeletal: Normal range of motion.      Right lower leg: No edema.      Left lower leg: No edema.   Lymphadenopathy:      Cervical: No cervical adenopathy.   Skin:     General: Skin is warm and dry.      Findings: No rash.   Neurological:      Mental Status: He is alert and oriented to person, place, and time.      Cranial Nerves: No cranial nerve deficit.      Sensory: No sensory deficit.      Motor: No abnormal muscle tone.      Coordination: Coordination normal.      Deep Tendon Reflexes: Reflexes normal.          Assessment:      ICD-10-CM    1. Hyperlipidemia, unspecified hyperlipidemia type E78.5 atorvastatin (LIPITOR) 20 MG tablet   2. Allergic rhinitis due to pollen, unspecified seasonality J30.1 fluticasone (FLONASE) 50 MCG/ACT nasal spray   3. Personal history of tobacco use Z87.891 Prof Fee: Shared Decision Making Visit for Lung Cancer Screening   4. Abdominal aortic atherosclerosis (H) I70.0    5. Prostate cancer on active surveillance since 2013 C61    6. Gastroesophageal reflux disease with esophagitis K21.0 omeprazole (PRILOSEC) 20 MG DR capsule        Plan: His exam today is unremarkable and stable.  His laboratory results were reviewed with him, everything looked fine.  He will continue with his current medications without change.  For his intermittent heartburn he can add additional over-the-counter medications as needed.  Of note is that he does not have any warning signs such as dysphasia, etc.  He will let me know if that ever occurs at which time upper GI endoscopy would be warranted.  Encouraged him to take his Lipitor in the evening for better results.  Meds were refilled as needed.  Everything else is up-to-date.  He will consider CT scanning for lung cancer screening and let me know if he would like to have that ordered.  He will follow-up with urology in 6 months, follow-up with me annually.        Lung Cancer Screening Shared Decision Making Visit     Josué Álvarez is eligible for lung cancer screening on the basis of the information provided in my signed lung cancer screening order.     I have discussed with patient the risks and benefits of screening for lung cancer with low-dose CT.     The risks include:  radiation exposure: one low dose chest CT has as much ionizing radiation as about 15 chest x-rays or 6 months of background radiation living in [place]    false positives: 96% of positive findings/nodules are NOT cancer, but some might still require additional diagnostic evaluation,  including biopsy  over-diagnosis: some slow growing cancers that might never have been clinically significant will be detected and treated unnecessarily     The benefit of early detection of lung cancer is contingent upon adherence to annual screening or more frequent follow up if indicated.     Furthermore, reaping the benefits of screening requires Josué MCKAY Obrienac to be willing and physically able to undergo diagnostic procedures, if indicated. Although no specific guide is available for determining severity of comorbidities, it is reasonable to withhold screening in patients who have greater mortality risk from other diseases.     We did discuss that the only way to prevent lung cancer is to not smoke. Smoking cessation assistance was not offered.    I did not offer risk estimation using a calculator such as this one:    ShouldIScreen

## 2020-01-06 NOTE — PROGRESS NOTES
Type of Visit  EST    Chief Complaint  Prostate cancer, low risk on active surveillance    HPI  Mr. Álvarez is a 64 year old male who follows up with low risk prostate cancer on active surveillance since 2013.  Patient has permanently relocated to the [facility].  He denies any changes in health since the last visit 7 months ago.  Specifically he denies unintentional weight loss, bone or pelvic pain.  He also denies gross hematuria and dysuria.    PCa History  The patient was initially diagnosed in 2013 with biopsy due to a PSA of 9.2  The patient underwent repeat biopsy in 2014 which was negative as well as MRI guided prostate biopsy in 2016 which was also negative.  The patient's prior urologist was Dr[facility].      Family History  Family History   Problem Relation Age of Onset     No Known Problems Other      Emphysema Mother      Other - See Comments Father         90, multiple medical problems     Other - See Comments Brother         MVA     Other - See Comments Brother         Multiple health problems     Heart Disease Brother         valve problems       Review of Systems  I personally reviewed the ROS with the patient.    Nursing Notes:   Kimberly Jauregui LPN  1/6/2020  1:45 PM  Signed  Pt presents to clinic for a six month prostate cancer follow up    Review of Systems:    Weight loss:    No     Recent fever/chills:  No   Night sweats:   No  Current skin rash:  No   Recent hair loss:  No  Heat intolerance:  No   Cold intolerance:  No  Chest pain:   No   Palpitations:   No  Shortness of breath:  No   Wheezing:   No  Constipation:    No   Diarrhea:   No   Nausea:   No   Vomiting:   No   Kidney/side pain:  No   Back pain:   No  Frequent headaches:  No   Dizziness:     No  Leg swelling:   No   Calf pain:    No      Physical Exam  Vitals:    01/06/20 1336   BP: 120/86   BP Location: Right arm   Patient Position: Sitting   Cuff Size: Adult Regular   Pulse: 70    Resp: 16   Weight: 69.9 kg (154 lb)    Constitutional: No acute distress.  Alert and cooperative   Head: NCAT  Eyes: Conjunctivae normal  Cardiovascular: Regular rate.  Pulmonary/Chest: Respirations are even and non-labored bilaterally, no audible wheezing  Abdominal: Soft. No distension, tenderness, masses or guarding.   Neurological: A + O x 3.  Cranial Nerves II-XII grossly intact.  Extremities: FLAQUITA x 4, Warm. No clubbing.  No cyanosis.    Skin: Pink, warm and dry.  No visible rashes noted.  Psychiatric:  Normal mood and affect  Back:  No left CVA tenderness.  No right CVA tenderness.  Genitourinary:  Nonpalpable bladder    Labs  Results for orders placed or performed in visit on 01/06/20   Prostate Specific Antigen GH     Status: Abnormal   Result Value Ref Range    Prostate Specific Antigen 12.011 (H) <3.100 ng/mL   Lipid Panel     Status: Abnormal   Result Value Ref Range    Cholesterol 196 <200 mg/dL    Triglycerides 158 (H) <150 mg/dL    HDL Cholesterol 67 23 - 92 mg/dL    LDL Cholesterol Calculated 97 <100 mg/dL    Non HDL Cholesterol 129 <130 mg/dL   Comprehensive Metabolic Panel     Status: None   Result Value Ref Range    Sodium 140 134 - 144 mmol/L    Potassium 4.2 3.5 - 5.1 mmol/L    Chloride 101 98 - 107 mmol/L    Carbon Dioxide 30 21 - 31 mmol/L    Anion Gap 9 3 - 14 mmol/L    Glucose 86 70 - 105 mg/dL    Urea Nitrogen 18 7 - 25 mg/dL    Creatinine 0.88 0.70 - 1.30 mg/dL    GFR Estimate 87 >60 mL/min/[1.73_m2]    GFR Estimate If Black >90 >60 mL/min/[1.73_m2]    Calcium 9.9 8.6 - 10.3 mg/dL    Bilirubin Total 0.9 0.3 - 1.0 mg/dL    Albumin 4.9 3.5 - 5.7 g/dL    Protein Total 7.9 6.4 - 8.9 g/dL    Alkaline Phosphatase 52 34 - 104 U/L    ALT 35 7 - 52 U/L    AST 28 13 - 39 U/L     Results for ARMOND GOEL (MRN [id number]) as of 1/6/2020 13:18   5/23/2019 12:58 1/6/2020 11:06   Prostate Specific Antigen 8.973 (H) 12.011 (H)     Past  PSAs  11/2018 9.73  5/2018  14.6  11/2017 11.6  4/2017  7.4  8/2016  9.8  1/2016  10.7  6/2015  8.73  5/2014  9.9  2/2014  10.3  10/2013 9.2*    *prompted biopsy    Imaging  CT a/p   9/18/2019  I personally reviewed and interpreted the images and report.  IMPRESSION:    This is the third episode of acute diverticulitis within the past 2  years, today associated with localized free air in the mesentery. No  abscess formation is seen.    Assessment  Mr. Álvarez is a 64 year old male who follows up with low risk prostate cancer on active surveillance since 2013.    Discussed his prostate cancer history including the 3 biopsies he has undergone including one MRI guided biopsy.  We also reviewed his past PSA values over the last 7 years.    Plan  Follow up PSA q6 months

## 2020-01-06 NOTE — NURSING NOTE
Pt presents to clinic for a six month prostate cancer follow up    Review of Systems:    Weight loss:    No     Recent fever/chills:  No   Night sweats:   No  Current skin rash:  No   Recent hair loss:  No  Heat intolerance:  No   Cold intolerance:  No  Chest pain:   No   Palpitations:   No  Shortness of breath:  No   Wheezing:   No  Constipation:    No   Diarrhea:   No   Nausea:   No   Vomiting:   No   Kidney/side pain:  No   Back pain:   No  Frequent headaches:  No   Dizziness:     No  Leg swelling:   No   Calf pain:    No

## 2020-01-28 ENCOUNTER — OFFICE VISIT (OUTPATIENT)
Dept: INTERNAL MEDICINE | Facility: OTHER | Age: 65
End: 2020-01-28
Attending: INTERNAL MEDICINE
Payer: COMMERCIAL

## 2020-01-28 VITALS
WEIGHT: 155.2 LBS | SYSTOLIC BLOOD PRESSURE: 132 MMHG | DIASTOLIC BLOOD PRESSURE: 84 MMHG | HEART RATE: 60 BPM | TEMPERATURE: 98.3 F | RESPIRATION RATE: 16 BRPM | BODY MASS INDEX: 22.27 KG/M2

## 2020-01-28 DIAGNOSIS — R10.13 ABDOMINAL PAIN, EPIGASTRIC: Primary | ICD-10-CM

## 2020-01-28 DIAGNOSIS — K21.00 GASTROESOPHAGEAL REFLUX DISEASE WITH ESOPHAGITIS: ICD-10-CM

## 2020-01-28 PROCEDURE — G0463 HOSPITAL OUTPT CLINIC VISIT: HCPCS

## 2020-01-28 PROCEDURE — 99213 OFFICE O/P EST LOW 20 MIN: CPT | Performed by: INTERNAL MEDICINE

## 2020-01-28 ASSESSMENT — ENCOUNTER SYMPTOMS
ALLERGIC/IMMUNOLOGIC NEGATIVE: 1
EYES NEGATIVE: 1
ENDOCRINE NEGATIVE: 1
CONSTITUTIONAL NEGATIVE: 1

## 2020-01-28 ASSESSMENT — PAIN SCALES - GENERAL: PAINLEVEL: NO PAIN (0)

## 2020-01-28 NOTE — H&P (VIEW-ONLY)
Chief Complaint: Reflux symptoms.    HPI: This patient comes in today with a complaint of continued reflux symptoms.  See previous note.  He is taking omeprazole but it does not seem to help.  He admits that his diet is very poor but he does not have any symptoms attributable to potential gallbladder disease such as postprandial bloating or pain in the right upper quadrant.  Bowels have been normal.  He does not have any discomfort with exertion, he says he actually feels fine when he exerts himself and feels that he could probably run a mile with no problem and he has no pains etc. when he is shoveling snow.  He has a lot of belching and burping especially at night.    Past Medical History:   Diagnosis Date     Abdominal aortic atherosclerosis (H)      Anxiety      Cyst of right kidney      Diverticulitis of intestine without perforation or abscess without bleeding     No Comments Provided     History of tobacco abuse      Hyperlipidemia      Left inguinal hernia      Prostate cancer (H)      Tubular adenoma        Past Surgical History:   Procedure Laterality Date     ARTHROSCOPY SHOULDER RT/LT Left 1980s     BIOPSY PROSTATE TRANSRECTAL  10/2016     COLONOSCOPY N/A 4/6/2018    F/U Colonoscopy 2023; tubular adenoma       No Known Allergies    Current Outpatient Medications   Medication Sig Dispense Refill     ASPIRIN ADULT LOW STRENGTH PO Take 81 mg by mouth daily       atorvastatin (LIPITOR) 20 MG tablet Take 1 tablet (20 mg) by mouth At Bedtime 90 tablet 3     busPIRone (BUSPAR) 15 MG tablet Take 1 tablet (15 mg) by mouth 2 times daily as needed (for anxiety) 180 tablet 3     fluticasone (FLONASE) 50 MCG/ACT nasal spray Spray 1 spray into both nostrils daily 16 mL 1     omeprazole (PRILOSEC) 20 MG DR capsule Take 1 capsule (20 mg) by mouth daily 90 capsule 3       Review of Systems   Constitutional: Negative.    Eyes: Negative.    Endocrine: Negative.    Allergic/Immunologic: Negative.        Physical  Exam  Vitals signs and nursing note reviewed.   Constitutional:       General: He is not in acute distress.     Appearance: Normal appearance. He is not ill-appearing, toxic-appearing or diaphoretic.   Neurological:      Mental Status: He is alert.         Assessment:        ICD-10-CM    1. Abdominal pain, epigastric R10.13 US Abdomen Limited     GASTROENTEROLOGY ADULT REF PROCEDURE ONLY   2. Gastroesophageal reflux disease with esophagitis K21.0 GASTROENTEROLOGY ADULT REF PROCEDURE ONLY       Plan: Reviewed with the patient.  I think this is mostly GI in nature and there is very low likelihood that this represents anything else including cardiac.  I recommended that he continue with the omeprazole and that we get a right upper quadrant ultrasound to be followed by upper GI endoscopy if negative.  I will let him know the results and we will proceed as indicated.

## 2020-01-28 NOTE — NURSING NOTE
Josué Álvarez is a 64 year old male presenting today for: acid reflux bothering all of January  Medication Reconciliation: complete    Sonal Hancock LPN 1/28/2020 3:55 PM

## 2020-01-29 ENCOUNTER — TELEPHONE (OUTPATIENT)
Dept: INTERNAL MEDICINE | Facility: OTHER | Age: 65
End: 2020-01-29

## 2020-01-29 ENCOUNTER — HOSPITAL ENCOUNTER (OUTPATIENT)
Dept: ULTRASOUND IMAGING | Facility: OTHER | Age: 65
Discharge: HOME | End: 2020-01-29
Attending: INTERNAL MEDICINE | Admitting: INTERNAL MEDICINE
Payer: COMMERCIAL

## 2020-01-29 ENCOUNTER — TELEPHONE (OUTPATIENT)
Dept: SURGERY | Facility: OTHER | Age: 65
End: 2020-01-29

## 2020-01-29 DIAGNOSIS — R10.13 ABDOMINAL PAIN, EPIGASTRIC: ICD-10-CM

## 2020-01-29 PROCEDURE — 76705 ECHO EXAM OF ABDOMEN: CPT

## 2020-01-29 NOTE — TELEPHONE ENCOUNTER
Patient referred by Dr. Billingsley for a Upper GI endoscopy ,  Diagnosis is abdominal pain, gastroesophageal reflux disease with esophagitis.  Please advise.  Thank you. Lazara Garcia on 1/29/2020 at 10:56 AM

## 2020-01-29 NOTE — TELEPHONE ENCOUNTER
Okay to schedule. May want do a bravo given symptoms. Dr Billingsley - This would allow us to correlate regurgitation symptoms to acid reflux or rule it out.  Otherwise we can just look for hiatal hernia or esophagitis on a scope and see what we see.       - Morgan

## 2020-01-30 ENCOUNTER — TELEPHONE (OUTPATIENT)
Dept: SURGERY | Facility: OTHER | Age: 65
End: 2020-01-30

## 2020-01-30 NOTE — TELEPHONE ENCOUNTER
Screening Questions for the Scheduling of Screening Colonoscopies   (If Colonoscopy is diagnostic, Provider should review the chart before scheduling.)  Are you younger than 50 or older than 80?  NO   Do you take aspirin or fish oil?  YES  - BOTH  (if yes, tell patient to stop 1 week prior to Colonoscopy)  Do you take warfarin (Coumadin), clopidogrel (Plavix), apixaban (Eliquis), dabigatram (Pradaxa), rivaroxaban (Xarelto) or any blood thinner? NO   Do you use oxygen at home?  NO   Do you have kidney disease? NO   Are you on dialysis? NO   Have you had a stroke or heart attack in the last year? NO   Have you had a stent in your heart or any blood vessel in the last year? NO   Have you had a transplant of any organ? NO   Have you had a colonoscopy or upper endoscopy (EGD) before? YES - COLONOSCOPY          When?  2018  Date of scheduled EGD/BRAVO  02/06/2020  Provider JUAREZ   Pharmacy

## 2020-02-03 RX ORDER — CHLORAL HYDRATE 500 MG
2 CAPSULE ORAL DAILY
COMMUNITY

## 2020-02-04 DIAGNOSIS — J30.1 ALLERGIC RHINITIS DUE TO POLLEN, UNSPECIFIED SEASONALITY: ICD-10-CM

## 2020-02-05 ENCOUNTER — ANESTHESIA EVENT (OUTPATIENT)
Dept: SURGERY | Facility: OTHER | Age: 65
End: 2020-02-05
Payer: COMMERCIAL

## 2020-02-05 RX ORDER — FLUTICASONE PROPIONATE 50 MCG
SPRAY, SUSPENSION (ML) NASAL
Qty: 16 G | Refills: 3 | Status: SHIPPED | OUTPATIENT
Start: 2020-02-05 | End: 2020-08-05

## 2020-02-05 NOTE — TELEPHONE ENCOUNTER
Routing refill request to provider for review/approval because:  Medication is reported/historical  LOV: 1/28/2020    Leah Preciado RN on 2/5/2020 at 3:32 PM

## 2020-02-06 ENCOUNTER — HOSPITAL ENCOUNTER (OUTPATIENT)
Facility: OTHER | Age: 65
Discharge: HOME | End: 2020-02-06
Attending: SURGERY | Admitting: SURGERY
Payer: COMMERCIAL

## 2020-02-06 ENCOUNTER — ANESTHESIA (OUTPATIENT)
Dept: SURGERY | Facility: OTHER | Age: 65
End: 2020-02-06
Payer: COMMERCIAL

## 2020-02-06 VITALS
WEIGHT: 155 LBS | SYSTOLIC BLOOD PRESSURE: 122 MMHG | BODY MASS INDEX: 22.24 KG/M2 | TEMPERATURE: 97.9 F | HEART RATE: 74 BPM | RESPIRATION RATE: 16 BRPM | DIASTOLIC BLOOD PRESSURE: 92 MMHG | OXYGEN SATURATION: 97 %

## 2020-02-06 PROCEDURE — 25800030 ZZH RX IP 258 OP 636: Performed by: SURGERY

## 2020-02-06 PROCEDURE — 88304 TISSUE EXAM BY PATHOLOGIST: CPT

## 2020-02-06 PROCEDURE — 43239 EGD BIOPSY SINGLE/MULTIPLE: CPT | Performed by: NURSE ANESTHETIST, CERTIFIED REGISTERED

## 2020-02-06 PROCEDURE — 40000010 ZZH STATISTIC ANES STAT CODE-CRNA PER MINUTE: Performed by: SURGERY

## 2020-02-06 PROCEDURE — 25000125 ZZHC RX 250: Performed by: NURSE ANESTHETIST, CERTIFIED REGISTERED

## 2020-02-06 PROCEDURE — 43239 EGD BIOPSY SINGLE/MULTIPLE: CPT

## 2020-02-06 PROCEDURE — 91035 G-ESOPH REFLX TST W/ELECTROD: CPT | Performed by: SURGERY

## 2020-02-06 PROCEDURE — 25000128 H RX IP 250 OP 636: Performed by: NURSE ANESTHETIST, CERTIFIED REGISTERED

## 2020-02-06 PROCEDURE — 27210294: Performed by: SURGERY

## 2020-02-06 PROCEDURE — 25000125 ZZHC RX 250: Performed by: SURGERY

## 2020-02-06 RX ORDER — ONDANSETRON 4 MG/1
4 TABLET, ORALLY DISINTEGRATING ORAL EVERY 30 MIN PRN
Status: DISCONTINUED | OUTPATIENT
Start: 2020-02-06 | End: 2020-02-06 | Stop reason: HOSPADM

## 2020-02-06 RX ORDER — SODIUM CHLORIDE, SODIUM LACTATE, POTASSIUM CHLORIDE, CALCIUM CHLORIDE 600; 310; 30; 20 MG/100ML; MG/100ML; MG/100ML; MG/100ML
INJECTION, SOLUTION INTRAVENOUS CONTINUOUS
Status: DISCONTINUED | OUTPATIENT
Start: 2020-02-06 | End: 2020-02-06 | Stop reason: HOSPADM

## 2020-02-06 RX ORDER — PROPOFOL 10 MG/ML
INJECTION, EMULSION INTRAVENOUS PRN
Status: DISCONTINUED | OUTPATIENT
Start: 2020-02-06 | End: 2020-02-06

## 2020-02-06 RX ORDER — FLUMAZENIL 0.1 MG/ML
0.2 INJECTION, SOLUTION INTRAVENOUS
Status: DISCONTINUED | OUTPATIENT
Start: 2020-02-06 | End: 2020-02-06 | Stop reason: HOSPADM

## 2020-02-06 RX ORDER — LIDOCAINE 40 MG/G
CREAM TOPICAL
Status: DISCONTINUED | OUTPATIENT
Start: 2020-02-06 | End: 2020-02-06 | Stop reason: HOSPADM

## 2020-02-06 RX ORDER — ONDANSETRON 2 MG/ML
4 INJECTION INTRAMUSCULAR; INTRAVENOUS
Status: DISCONTINUED | OUTPATIENT
Start: 2020-02-06 | End: 2020-02-06 | Stop reason: HOSPADM

## 2020-02-06 RX ORDER — PROPOFOL 10 MG/ML
INJECTION, EMULSION INTRAVENOUS CONTINUOUS PRN
Status: DISCONTINUED | OUTPATIENT
Start: 2020-02-06 | End: 2020-02-06

## 2020-02-06 RX ORDER — NALOXONE HYDROCHLORIDE 0.4 MG/ML
.1-.4 INJECTION, SOLUTION INTRAMUSCULAR; INTRAVENOUS; SUBCUTANEOUS
Status: DISCONTINUED | OUTPATIENT
Start: 2020-02-06 | End: 2020-02-06 | Stop reason: HOSPADM

## 2020-02-06 RX ORDER — FENTANYL CITRATE 50 UG/ML
25-50 INJECTION, SOLUTION INTRAMUSCULAR; INTRAVENOUS
Status: DISCONTINUED | OUTPATIENT
Start: 2020-02-06 | End: 2020-02-06 | Stop reason: HOSPADM

## 2020-02-06 RX ORDER — ONDANSETRON 2 MG/ML
4 INJECTION INTRAMUSCULAR; INTRAVENOUS EVERY 30 MIN PRN
Status: DISCONTINUED | OUTPATIENT
Start: 2020-02-06 | End: 2020-02-06 | Stop reason: HOSPADM

## 2020-02-06 RX ORDER — HYDROMORPHONE HYDROCHLORIDE 1 MG/ML
.3-.5 INJECTION, SOLUTION INTRAMUSCULAR; INTRAVENOUS; SUBCUTANEOUS EVERY 10 MIN PRN
Status: DISCONTINUED | OUTPATIENT
Start: 2020-02-06 | End: 2020-02-06 | Stop reason: HOSPADM

## 2020-02-06 RX ORDER — MEPERIDINE HYDROCHLORIDE 50 MG/ML
12.5 INJECTION INTRAMUSCULAR; INTRAVENOUS; SUBCUTANEOUS
Status: DISCONTINUED | OUTPATIENT
Start: 2020-02-06 | End: 2020-02-06 | Stop reason: HOSPADM

## 2020-02-06 RX ORDER — LIDOCAINE HYDROCHLORIDE 20 MG/ML
INJECTION, SOLUTION INFILTRATION; PERINEURAL PRN
Status: DISCONTINUED | OUTPATIENT
Start: 2020-02-06 | End: 2020-02-06

## 2020-02-06 RX ADMIN — SODIUM CHLORIDE, POTASSIUM CHLORIDE, SODIUM LACTATE AND CALCIUM CHLORIDE: 600; 310; 30; 20 INJECTION, SOLUTION INTRAVENOUS at 09:45

## 2020-02-06 RX ADMIN — PROPOFOL 140 MCG/KG/MIN: 10 INJECTION, EMULSION INTRAVENOUS at 10:01

## 2020-02-06 RX ADMIN — LIDOCAINE HYDROCHLORIDE 60 MG: 20 INJECTION, SOLUTION INFILTRATION; PERINEURAL at 10:01

## 2020-02-06 RX ADMIN — PROPOFOL 150 MG: 10 INJECTION, EMULSION INTRAVENOUS at 10:01

## 2020-02-06 ASSESSMENT — LIFESTYLE VARIABLES: TOBACCO_USE: 1

## 2020-02-06 NOTE — INTERVAL H&P NOTE
I saw and examined Josué lÁvarez.  I have reviewed the history and physical and find no changes to the patient's medical status or condition with the exceptions noted below.     Morgan Perdue MD   9:21 AM 2/6/2020

## 2020-02-06 NOTE — ANESTHESIA PREPROCEDURE EVALUATION
Anesthesia Pre-Procedure Evaluation    Patient: Josué Álvarez   MRN: [id number] : 1955          Preoperative Diagnosis: Abdominal pain [R10.9]  Hiatal hernia with GERD [K21.9, K44.9]    Procedure(s):  ESOPHAGOGASTRODUODENOSCOPY, WITH BRAVO PH MONITORING DEVICE INSERTION    Past Medical History:   Diagnosis Date     Abdominal aortic atherosclerosis (H)      Anxiety      Cyst of right kidney      Diverticulitis of intestine without perforation or abscess without bleeding     No Comments Provided     History of tobacco abuse      Hyperlipidemia      Left inguinal hernia      Prostate cancer (H)      Tubular adenoma      Past Surgical History:   Procedure Laterality Date     ARTHROSCOPY SHOULDER RT/LT Left 1980s     BIOPSY PROSTATE TRANSRECTAL  10/2016     COLONOSCOPY N/A 2018    F/U Colonoscopy ; tubular adenoma       Anesthesia Evaluation     . Pt has had prior anesthetic.     No history of anesthetic complications          ROS/MED HX    ENT/Pulmonary:     (+)tobacco use, Past use , . .    Neurologic:  - neg neurologic ROS     Cardiovascular:     (+) Dyslipidemia, ----. : . . . :. .       METS/Exercise Tolerance:  >4 METS   Hematologic:  - neg hematologic  ROS       Musculoskeletal:  - neg musculoskeletal ROS       GI/Hepatic:     (+) GERD Symptomatic,       Renal/Genitourinary:     (+) Other Renal/ Genitourinary, Right renal cyst      Endo:  - neg endo ROS       Psychiatric:  - neg psychiatric ROS       Infectious Disease:  - neg infectious disease ROS       Malignancy:      - no malignancy   Other:    - neg other ROS                      Physical Exam  Normal systems: cardiovascular, pulmonary and dental    Airway   Mallampati: II  TM distance: >3 FB  Neck ROM: full    Dental     Cardiovascular   Rhythm and rate: regular and normal      Pulmonary    breath sounds clear to auscultation            Lab Results   Component Value Date    WBC 7.6 2018    HGB 11.6 (L) 2018    HCT 34.0 (L)  "09/20/2018     09/20/2018    SED 10 08/14/2017     01/06/2020    POTASSIUM 4.2 01/06/2020    CHLORIDE 101 01/06/2020    CO2 30 01/06/2020    BUN 18 01/06/2020    CR 0.88 01/06/2020    GLC 86 01/06/2020    KAREN 9.9 01/06/2020    MAG 2.3 09/20/2018    ALBUMIN 4.9 01/06/2020    PROTTOTAL 7.9 01/06/2020    ALT 35 01/06/2020    AST 28 01/06/2020    ALKPHOS 52 01/06/2020    BILITOTAL 0.9 01/06/2020    LIPASE 7.6 (L) 08/14/2017    AMYLASE 23 (L) 08/14/2017       Preop Vitals  BP Readings from Last 3 Encounters:   02/06/20 (!) 154/105   01/28/20 132/84   01/06/20 118/76    Pulse Readings from Last 3 Encounters:   02/06/20 71   01/28/20 60   01/06/20 72      Resp Readings from Last 3 Encounters:   02/06/20 16   01/28/20 16   01/06/20 18    SpO2 Readings from Last 3 Encounters:   02/06/20 98%   09/20/18 98%   09/17/18 97%      Temp Readings from Last 1 Encounters:   02/06/20 98.3  F (36.8  C) (Tympanic)    Ht Readings from Last 1 Encounters:   01/06/20 1.778 m (5' 10\")      Wt Readings from Last 1 Encounters:   02/06/20 70.3 kg (155 lb)    Estimated body mass index is 22.24 kg/m  as calculated from the following:    Height as of 1/6/20: 1.778 m (5' 10\").    Weight as of this encounter: 70.3 kg (155 lb).       Anesthesia Plan      History & Physical Review      ASA Status:  2 .    NPO Status:  > 6 hours    Plan for MAC with Propofol induction.          Postoperative Care      Consents  Anesthetic plan, risks, benefits and alternatives discussed with:  Patient..                 KEKE GARCIA CRNA  "

## 2020-02-06 NOTE — OP NOTE
PROCEDURE NOTE    SURGEON:Morgan Perdue MD    PRE-OP DIAGNOSIS:   Epigastric abdominal pain, GERD      POST-OP DIAGNOSIS: Epigastric pain, GERD    PROCEDURE PLANNED:   Diagnostic EGD, flexible        PROCEDURE PERFORMED:  EGD with biopsy, flexible    SPECIMEN:     ID Type Source Tests Collected by Time Destination   A : duodenum biopsies Biopsy Small Intestine, Duodenum SURGICAL PATHOLOGY EXAM Morgan Perdue MD 2/6/2020 10:04 AM    B : antrum biopsies Biopsy Stomach, Antrum SURGICAL PATHOLOGY EXAM Morgan Perdue MD 2/6/2020 10:05 AM    C : GE junction biopsies Biopsy Gastro Esophageal Junction SURGICAL PATHOLOGY EXAM Morgan Perdue MD 2/6/2020 10:07 AM            ANESTHESIA: Monitor Anesthesia Care  Coverage requested    CRNA Independent: Na Braga APRN CRNA      ESTIMATED BLOOD LOSS: None    COMPLICATIONS:  None    INDICATION FOR THE PROCEDURE:The patient is a 64 year oldmale. The patient presents with epigastric pain and possible GERD. I explained to the patient the risks, benefits and alternatives to diagnostic EGD  with bravo pH capsule placement for evaluating GERD. We specifically discussed the risks of bleeding, infection, perforation and the risks of sedation. The patient's questions were answered and the patient wished to proceed. Informed consent paperwork was completed.    PROCEDURE: The patient was taken to the endoscopy suite. Appropriate monitors were attached. The patient was placed in the left lateral decubitus position. Bite block was positioned. Timeout was performed confirming the patient's identity and procedure to be performed. After appropriate sedation was confirmed, the flexible endoscope was advanced into the oropharynx. The posterior oropharynx appeared grossly normal. The scope was advanced into the proximal esophagus. The esophagus was insufflated with air. The scope was advanced under direct visualization. No acute abnormalities of the esophagus were noted. The scope was  advanced into the stomach. The scope was advanced through the pylorus into the duodenal bulb. The bulb and distal duodenum appeared grossly normal.  The scope was withdrawn back into the stomach. Antral biopsy was obtained and sent to pathology. There was antral gastritis. The scope was retroflexed and the GE junction inspected. No abnormalities were noted. The scope was returned to a neutral position and the stomach was decompressed. The scope was withdrawn to the GE junction and biopsy obtained.  The hiatus measured 42.  The Z -line measured 42. There was LA Grade B change without inflammatory esophagitis. The mucosa of the esophagus was inspected while withdrawing the scope. No abnormalities were noted. . The scope was withdrawn from the patient.  The bravo catheter was advanced to 36 cm. Suction was held for at least 30 seconds without interruption. The probe was deployed. The scope was re-advanced and the capsule verified.  The bite block was removed.The bite block was removed. The patient tolerated the procedure with no immediately apparent complication. The patient was taken to recovery instable condition.    FOLLOW UP:  RECOMMENDATIONS  Follow-up pathology and bravo results.

## 2020-02-06 NOTE — ANESTHESIA CARE TRANSFER NOTE
Patient: Josué Álvarez    Procedure(s):  ESOPHAGOGASTRODUODENOSCOPY, WITH BRAVO PH MONITORING DEVICE INSERTION    Diagnosis: Abdominal pain [R10.9]  Hiatal hernia with GERD [K21.9, K44.9]  Diagnosis Additional Information: No value filed.    Anesthesia Type:   MAC     Note:  Airway :Room Air  Patient transferred to:Phase II  Handoff Report: Identifed the Patient, Identified the Reponsible Provider, Reviewed the pertinent medical history, Discussed the surgical course, Reviewed Intra-OP anesthesia mangement and issues during anesthesia, Set expectations for post-procedure period and Allowed opportunity for questions and acknowledgement of understanding      Vitals: (Last set prior to Anesthesia Care Transfer)    CRNA VITALS  2/6/2020 0944 - 2/6/2020 1017      2/6/2020             Pulse:  70    Ht Rate:  70    SpO2:  96 %                Electronically Signed By: KEKE GARCIA CRNA  February 6, 2020  10:17 AM

## 2020-02-06 NOTE — ANESTHESIA POSTPROCEDURE EVALUATION
Patient: Josué Álvarez    Procedure(s):  ESOPHAGOGASTRODUODENOSCOPY, WITH BRAVO PH MONITORING DEVICE INSERTION    Diagnosis:Abdominal pain [R10.9]  Hiatal hernia with GERD [K21.9, K44.9]  Diagnosis Additional Information: No value filed.    Anesthesia Type:  MAC    Note:  Anesthesia Post Evaluation    Patient location during evaluation: Phase 2 and Endoscopy Recovery  Patient participation: Able to fully participate in evaluation  Level of consciousness: awake and alert  Pain management: adequate  Airway patency: patent  Cardiovascular status: acceptable  Respiratory status: acceptable  Hydration status: acceptable  PONV: none     Anesthetic complications: None          Last vitals:  Vitals:    02/06/20 0937   BP: (!) 154/105   Pulse: 71   Resp: 16   Temp: 98.3  F (36.8  C)   SpO2: 98%         Electronically Signed By: KEKE Molina CRNA  February 6, 2020  12:00 PM

## 2020-02-06 NOTE — H&P
CHIEF COMPLAINT / REASON FOR PROCEDURE:  Abdominal pain, GERD    PERTINENT HISTORY   Patient complains of post prandial pain. Previous EGD - None.     Past Medical History:   Diagnosis Date     Abdominal aortic atherosclerosis (H)      Anxiety      Cyst of right kidney      Diverticulitis of intestine without perforation or abscess without bleeding     No Comments Provided     History of tobacco abuse      Hyperlipidemia      Left inguinal hernia      Prostate cancer (H)      Tubular adenoma        Past Surgical History:   Procedure Laterality Date     ARTHROSCOPY SHOULDER RT/LT Left 1980s     BIOPSY PROSTATE TRANSRECTAL  10/2016     COLONOSCOPY N/A 4/6/2018    F/U Colonoscopy 2023; tubular adenoma       Other:  None  Bleeding tendencies:  None      Relevant Family History: None     Relevant Social History:  None     10 point ROS of systems including Constitutional, Eyes, Respiratory, Cardiovascular, Gastroenterology, Genitourinary, Integumentary, Muscularskeletal, Psychiatric were all negative except for pertinent positives noted in my HPI.      ALLERGIES/SENSITIVITIES: No Known Allergies     CURRENT MEDICATIONS:  No current facility-administered medications on file prior to encounter.   ASPIRIN ADULT LOW STRENGTH PO, Take 81 mg by mouth daily  atorvastatin (LIPITOR) 20 MG tablet, Take 1 tablet (20 mg) by mouth At Bedtime  busPIRone (BUSPAR) 15 MG tablet, Take 1 tablet (15 mg) by mouth 2 times daily as needed (for anxiety)  fish oil-omega-3 fatty acids 1000 MG capsule, Take 2 g by mouth daily  omeprazole (PRILOSEC) 20 MG DR capsule, Take 1 capsule (20 mg) by mouth daily          PRE-SEDATION ASSESSMENT:    LUNGS:  CTA B/L, no wheezing or crackles.  Heart & CV:  RRR no murmur.  Intact distal pulses, good cap refill.    Comment(s):      IMPRESSION:  64 year old male in need of EGD to evaluate abdominal pain and GERD  .    PLAN:  I discussed diagnostic EGD with the patient. Anesthesia requested    Morgan Perdue  MD

## 2020-02-06 NOTE — OR NURSING
"Bravo equipment was prepared for the procedure. The Bravo recorder settings were verified and the Bravo capsule was calibrated. The Bravo capsule was placed, by the physician, at 6cm above the identified \"SCJ\" following endoscopy. Recorder mode was activated after the confirmed attachment.   "

## 2020-02-06 NOTE — DISCHARGE INSTRUCTIONS
You were given instruction today on the BRAVO recording device and how to enter your Symptoms via the recorder. You were also given a 2 page instruction packet that includes a written diary. You will need to bring back the written diary and the BRAVO recording device to [facility] after 48 hours, which would be after Saturday, Feb 8th at 10:15 am. (ER on the weekend or DSU on Monday)

## 2020-02-18 DIAGNOSIS — K21.9 GASTROESOPHAGEAL REFLUX DISEASE WITHOUT ESOPHAGITIS: Primary | ICD-10-CM

## 2020-02-18 PROCEDURE — 91035 G-ESOPH REFLX TST W/ELECTROD: CPT | Mod: 26 | Performed by: SURGERY

## 2020-02-20 ENCOUNTER — OFFICE VISIT (OUTPATIENT)
Dept: INTERNAL MEDICINE | Facility: OTHER | Age: 65
End: 2020-02-20
Attending: INTERNAL MEDICINE
Payer: COMMERCIAL

## 2020-02-20 VITALS
TEMPERATURE: 98.8 F | WEIGHT: 152.4 LBS | DIASTOLIC BLOOD PRESSURE: 88 MMHG | RESPIRATION RATE: 16 BRPM | SYSTOLIC BLOOD PRESSURE: 132 MMHG | BODY MASS INDEX: 21.87 KG/M2 | HEART RATE: 64 BPM

## 2020-02-20 DIAGNOSIS — K21.9 GASTROESOPHAGEAL REFLUX DISEASE WITHOUT ESOPHAGITIS: ICD-10-CM

## 2020-02-20 DIAGNOSIS — F41.9 ANXIETY: ICD-10-CM

## 2020-02-20 DIAGNOSIS — K57.20 DIVERTICULITIS OF LARGE INTESTINE WITH PERFORATION WITHOUT BLEEDING: Primary | ICD-10-CM

## 2020-02-20 DIAGNOSIS — N28.1 CYST OF RIGHT KIDNEY: ICD-10-CM

## 2020-02-20 PROCEDURE — 99214 OFFICE O/P EST MOD 30 MIN: CPT | Performed by: INTERNAL MEDICINE

## 2020-02-20 PROCEDURE — G0463 HOSPITAL OUTPT CLINIC VISIT: HCPCS

## 2020-02-20 ASSESSMENT — PAIN SCALES - GENERAL: PAINLEVEL: NO PAIN (0)

## 2020-02-20 ASSESSMENT — ENCOUNTER SYMPTOMS
ALLERGIC/IMMUNOLOGIC NEGATIVE: 1
ENDOCRINE NEGATIVE: 1
HEMATOLOGIC/LYMPHATIC NEGATIVE: 1
CONSTITUTIONAL NEGATIVE: 1

## 2020-02-20 NOTE — NURSING NOTE
Josué Álvarez is a 64 year old male presenting today for: follow up endoscopy  Medication Reconciliation: complete    Sonal Hancock LPN 2/20/2020 10:45 AM

## 2020-02-20 NOTE — PROGRESS NOTES
Chief Complaint:  F/U on issues.    HPI: He returns here today for a follow-up.  He is actually feeling much better.  All of his testing has been completed.  This does show that he had some gastritis, likely from acid.  His pH study was fairly unremarkable and Dr. Perdue did not think that he would benefit from a PPI.  Note the patient is actually following a healthy diet like he should and limiting caffeine and alcohol, he is much better on the Prilosec.  At this point in time we will just continue with Prilosec and do nothing else.  We could always consider adding amitriptyline to his regimen.  We can always consider doing a HIDA scan as well but as long as he is improved, we will continue with encouraging healthy diet and lifestyle.    He had a lot of questions today about diverticulitis and diverticulosis.  That was reviewed with him.    He had questions about his anxiety.  He is of course moderately anxious.  He was wondering if he should be on additional medications or a higher dose of BuSpar.  He tells me that he does not want to change the dose.    Past Medical History:   Diagnosis Date     Abdominal aortic atherosclerosis (H)      Anxiety      Cyst of right kidney      Diverticulitis of intestine without perforation or abscess without bleeding     No Comments Provided     History of tobacco abuse      Hyperlipidemia      Left inguinal hernia      Prostate cancer (H)      Tubular adenoma        Past Surgical History:   Procedure Laterality Date     ARTHROSCOPY SHOULDER RT/LT Left 1980s     BIOPSY PROSTATE TRANSRECTAL  10/2016     COLONOSCOPY N/A 4/6/2018    F/U Colonoscopy 2023; tubular adenoma       No Known Allergies    Current Outpatient Medications   Medication Sig Dispense Refill     ASPIRIN ADULT LOW STRENGTH PO Take 81 mg by mouth daily       atorvastatin (LIPITOR) 20 MG tablet Take 1 tablet (20 mg) by mouth At Bedtime 90 tablet 3     busPIRone (BUSPAR) 15 MG tablet Take 1 tablet (15 mg) by mouth 2  times daily as needed (for anxiety) 180 tablet 3     fish oil-omega-3 fatty acids 1000 MG capsule Take 2 g by mouth daily       fluticasone (FLONASE) 50 MCG/ACT nasal spray INSTILL 2 SPRAYS INTO EACH NARE ONCE DAILY FOR CHRONIC NASAL CONGESTION AND POLYP 16 g 3     omeprazole (PRILOSEC) 20 MG DR capsule Take 1 capsule (20 mg) by mouth daily 90 capsule 3       Social History     Socioeconomic History     Marital status: Single     Spouse name: Not on file     Number of children: Not on file     Years of education: Not on file     Highest education level: Not on file   Occupational History     Not on file   Social Needs     Financial resource strain: Not on file     Food insecurity:     Worry: Not on file     Inability: Not on file     Transportation needs:     Medical: Not on file     Non-medical: Not on file   Tobacco Use     Smoking status: Former Smoker     Last attempt to quit: 10/30/2014     Years since quittin.3     Smokeless tobacco: Never Used   Substance and Sexual Activity     Alcohol use: Yes     Frequency: 2-3 times a week     Drinks per session: 3 or 4     Comment: occasional     Drug use: No     Comment: Drug use: No     Sexual activity: Not Currently   Lifestyle     Physical activity:     Days per week: Not on file     Minutes per session: Not on file     Stress: Not on file   Relationships     Social connections:     Talks on phone: Not on file     Gets together: Not on file     Attends Shinto service: Not on file     Active member of club or organization: Not on file     Attends meetings of clubs or organizations: Not on file     Relationship status: Not on file     Intimate partner violence:     Fear of current or ex partner: Not on file     Emotionally abused: Not on file     Physically abused: Not on file     Forced sexual activity: Not on file   Other Topics Concern     Not on file   Social History Narrative    Moved to [place] from [place], ex wife lives here.  Retired from   work, construction.       Review of Systems   Constitutional: Negative.    Endocrine: Negative.    Skin: Negative.    Allergic/Immunologic: Negative.    Hematological: Negative.        Physical Exam  Vitals signs and nursing note reviewed.   Constitutional:       General: He is not in acute distress.     Appearance: Normal appearance. He is not ill-appearing, toxic-appearing or diaphoretic.   Skin:     General: Skin is warm and dry.   Neurological:      Mental Status: He is alert.   Psychiatric:      Comments: He is somewhat anxious with somewhat rapid speech as per usual         Assessment:      ICD-10-CM    1. Diverticulitis of large intestine with perforation without bleeding K57.20    2. Gastroesophageal reflux disease without esophagitis K21.9    3. Cyst of right kidney N28.1    4. Anxiety F41.9        Plan: No change in medications for now.  Consider adding amitriptyline at some point in time.  Discussion was held regarding all of his other issues.  Of note is that he does have this cyst on his kidney but it has been unchanged for 4 years so I do not think that any further investigation is warranted at this time.  Assuming all goes well, he will follow-up as needed.  I expect that if he stays on a healthy diet, etc. he should be improved in regards to his symptoms.  Over 50% of a 25-minute visit spent in counseling and coordination of care.

## 2020-02-24 NOTE — TELEPHONE ENCOUNTER
Patient had an appointment with [facility] 02/20/19.  This writer will now close this encounter.   Irma Donnelly LPN 2/24/2020   9:52 AM

## 2020-02-27 ENCOUNTER — TELEPHONE (OUTPATIENT)
Dept: INTERNAL MEDICINE | Facility: OTHER | Age: 65
End: 2020-02-27

## 2020-02-27 DIAGNOSIS — K21.9 GASTROESOPHAGEAL REFLUX DISEASE WITHOUT ESOPHAGITIS: Primary | ICD-10-CM

## 2020-02-27 NOTE — TELEPHONE ENCOUNTER
Pt is having stomach issues again.  He would like to go ahead and get the medication that was discussed

## 2020-02-28 ENCOUNTER — TELEPHONE (OUTPATIENT)
Dept: INTERNAL MEDICINE | Facility: OTHER | Age: 65
End: 2020-02-28

## 2020-02-28 RX ORDER — AMITRIPTYLINE HYDROCHLORIDE 10 MG/1
10 TABLET ORAL AT BEDTIME
Qty: 90 TABLET | Refills: 3 | Status: SHIPPED | OUTPATIENT
Start: 2020-02-28 | End: 2022-07-07

## 2020-02-28 NOTE — TELEPHONE ENCOUNTER
After the patient's full name and date of birth was verified, the patient was notified of the below information.  Sonal Hancock LPN on 2/28/2020 at 3:25 PM

## 2020-02-28 NOTE — TELEPHONE ENCOUNTER
Patient notified of script sent per previous message.  Franca Chacon LPN .............2/28/2020     1:46 PM

## 2020-03-18 ENCOUNTER — TELEPHONE (OUTPATIENT)
Dept: INTERNAL MEDICINE | Facility: OTHER | Age: 65
End: 2020-03-18

## 2020-03-18 NOTE — TELEPHONE ENCOUNTER
After the patient's full name and date of birth was verified, the patient was notified of the below information.  Sonal Hancock LPN on 3/18/2020 at 3:40 PM

## 2020-03-18 NOTE — TELEPHONE ENCOUNTER
Reason for call: Request for a referral.    Referral requested for what concern? Neck pain     Have you already been seen by the specialty you need the referral for?  Yes    If yes, Date:   unknown,  Location:   [facility]  Provider:   NA    If no,  Where do you want to go?   [facility]    Additional comments:  Patient has went to therapy for this issues last summer, pain has come back.    Preferred method for responding to this message: Telephone Call    Phone number patient can be reached at? Cell number on file:    Telephone Information:   Mobile [phone]       If we can't reach you directly, may we leave a detailed response at the number you provided? Yes

## 2020-06-29 ENCOUNTER — TELEPHONE (OUTPATIENT)
Dept: UROLOGY | Facility: OTHER | Age: 65
End: 2020-06-29

## 2020-06-29 DIAGNOSIS — C61 PROSTATE CANCER (H): Primary | ICD-10-CM

## 2020-07-01 ENCOUNTER — TELEPHONE (OUTPATIENT)
Dept: UROLOGY | Facility: OTHER | Age: 65
End: 2020-07-01

## 2020-07-01 NOTE — TELEPHONE ENCOUNTER
Call returned to patient who states that he has had some blood in his urine and in his semen. On the 6/24-6/25 he had blood in his urine. He had blood on the tip of his penis too.  On 6/26 he noticed blood in his semen. He states the blood was bright red in color. He had been riding a bike prior to this for an hour. He has not noticed any bleeding in his urine or his semen since the 06/26/2020. There is no pain with this. He has had no new sexual partners. He reports no other trauma besides riding the bike. He has had prostate cancer on active surveillance since 2013. He has had 3 prostate biopsies per chart.  He sees Dr. Dawson on Monday 7/6/2020. Advised patient to keep his appointment and it can be addressed. He is going to notify the clinic if his bleeding  continues or worsens.     Rosalia Cespedes RN on 7/1/2020 at 11:46 AM

## 2020-07-01 NOTE — TELEPHONE ENCOUNTER
Patient states the end of June he had noticed blood in his urine and in his semnn. He would like to speak to the nurse about this prior to his upcoming visit this Monday 07/06.    Beatriz Wilkes on 7/1/2020 at 11:32 AM

## 2020-07-06 ENCOUNTER — OFFICE VISIT (OUTPATIENT)
Dept: UROLOGY | Facility: OTHER | Age: 65
End: 2020-07-06
Attending: UROLOGY
Payer: COMMERCIAL

## 2020-07-06 VITALS
BODY MASS INDEX: 21.24 KG/M2 | DIASTOLIC BLOOD PRESSURE: 72 MMHG | RESPIRATION RATE: 16 BRPM | WEIGHT: 148 LBS | SYSTOLIC BLOOD PRESSURE: 136 MMHG

## 2020-07-06 DIAGNOSIS — R31.0 GROSS HEMATURIA: ICD-10-CM

## 2020-07-06 DIAGNOSIS — C61 PROSTATE CANCER (H): ICD-10-CM

## 2020-07-06 DIAGNOSIS — C61 PROSTATE CANCER (H): Primary | ICD-10-CM

## 2020-07-06 LAB
ALBUMIN UR-MCNC: 10 MG/DL
APPEARANCE UR: CLEAR
BILIRUB UR QL STRIP: NEGATIVE
COLOR UR AUTO: YELLOW
GLUCOSE UR STRIP-MCNC: NEGATIVE MG/DL
HGB UR QL STRIP: NEGATIVE
KETONES UR STRIP-MCNC: NEGATIVE MG/DL
LEUKOCYTE ESTERASE UR QL STRIP: NEGATIVE
MUCOUS THREADS #/AREA URNS LPF: PRESENT /LPF
NITRATE UR QL: NEGATIVE
PH UR STRIP: 6.5 PH (ref 5–7)
PSA SERPL-MCNC: 9.8 NG/ML
RBC #/AREA URNS AUTO: 4 /HPF (ref 0–2)
SOURCE: ABNORMAL
SP GR UR STRIP: 1.03 (ref 1–1.03)
UROBILINOGEN UR STRIP-MCNC: NORMAL MG/DL (ref 0–2)
WBC #/AREA URNS AUTO: 1 /HPF (ref 0–5)

## 2020-07-06 PROCEDURE — 81001 URINALYSIS AUTO W/SCOPE: CPT | Mod: ZL | Performed by: UROLOGY

## 2020-07-06 PROCEDURE — 36415 COLL VENOUS BLD VENIPUNCTURE: CPT | Mod: ZL | Performed by: UROLOGY

## 2020-07-06 PROCEDURE — 99214 OFFICE O/P EST MOD 30 MIN: CPT | Performed by: UROLOGY

## 2020-07-06 PROCEDURE — G0463 HOSPITAL OUTPT CLINIC VISIT: HCPCS

## 2020-07-06 PROCEDURE — 84153 ASSAY OF PSA TOTAL: CPT | Mod: ZL | Performed by: UROLOGY

## 2020-07-06 ASSESSMENT — PAIN SCALES - GENERAL: PAINLEVEL: NO PAIN (0)

## 2020-07-06 NOTE — PROGRESS NOTES
Type of Visit  EST    Chief Complaint  Prostate cancer, low risk on active surveillance  Gross hematuria    HPI  Mr. Álvarez is a 64 year old male who follows up with low risk prostate cancer on active surveillance since 2013.  Patient has permanently relocated to the [facility].  He denies any changes in health since the last visit 7 months ago.  Specifically he denies unintentional weight loss, bone or pelvic pain.  He also denies gross hematuria and dysuria.    In addition the patient noticed spontaneous and painless gross hematuria develop a few weeks back.  He was riding his bike a greater distance than he typically rides when the hematuria started.  After a few voids the hematuria spontaneously resolved.  The patient was never seen and he was never treated.  Since that event he has denies recurrent gross hematuria.    PCa History  The patient was initially diagnosed in 2013 with biopsy due to a PSA of 9.2  The patient underwent repeat biopsy in 2014 which was negative as well as MRI guided prostate biopsy in 2016 which was also negative.  The patient's prior urologist was Dr. Hernandes of [facility] in the [facility].      Family History  Family History   Problem Relation Age of Onset     No Known Problems Other      Emphysema Mother      Other - See Comments Father         90, multiple medical problems     Other - See Comments Brother         MVA     Other - See Comments Brother         Multiple health problems     Heart Disease Brother         valve problems       Review of Systems  I personally reviewed the ROS with the patient.    Nursing Notes:   Kimberly Jauregui LPN  7/6/2020  1:17 PM  Sign at exiting of workspace  Pt presents to clinic for 6 month history of prostate cancer    Review of Systems:    Weight loss:    No     Recent fever/chills:  No   Night sweats:   No  Current skin rash:  No   Recent hair loss:  No  Heat intolerance:  No   Cold intolerance:  No  Chest  pain:   No   Palpitations:   No  Shortness of breath:  No   Wheezing:   No  Constipation:    No   Diarrhea:   No   Nausea:   No   Vomiting:   No   Kidney/side pain:  No   Back pain:   No  Frequent headaches:  No   Dizziness:     No  Leg swelling:   No   Calf pain:    No    Physical Exam  Vitals:    07/06/20 1317   BP: 136/72   BP Location: Left arm   Patient Position: Sitting   Cuff Size: Adult Regular   Resp: 16   Weight: 67.1 kg (148 lb)    Constitutional: No acute distress.  Alert and cooperative   Head: NCAT  Eyes: Conjunctivae normal  Cardiovascular: Regular rate.  Pulmonary/Chest: Respirations are even and non-labored bilaterally, no audible wheezing  Abdominal: Soft. No distension, tenderness, masses or guarding.   Neurological: A + O x 3.  Cranial Nerves II-XII grossly intact.  Extremities: FLAQUITA x 4, Warm. No clubbing.  No cyanosis.    Skin: Pink, warm and dry.  No visible rashes noted.  Psychiatric:  Normal mood and affect  Back:  No left CVA tenderness.  No right CVA tenderness.  Genitourinary:  Nonpalpable bladder    Labs  Results for ARMOND GOEL (MRN [id number]) as of 7/6/2020 14:23   7/6/2020 13:29   Color Urine Yellow   Appearance Urine Clear   Glucose Urine Negative   Bilirubin Urine Negative   Ketones Urine Negative   Specific Gravity Urine 1.028   pH Urine 6.5   Protein Albumin Urine 10 (A)   Urobilinogen mg/dL Normal   Nitrite Urine Negative   Blood Urine Negative   Leukocyte Esterase Urine Negative   Source Midstream Urine   WBC Urine 1   RBC Urine 4 (H)   Mucous Urine Present (A)       Results for ARMOND GOEL (MRN [id number]) as of 7/6/2020 13:20   5/23/2019 12:58 1/6/2020 11:06 7/6/2020 10:40   PSA 8.973 (H) 12.011 (H) 9.798 (H)     Past PSAs  11/2018 9.73  5/2018  14.6  11/2017 11.6  4/2017  7.4  8/2016  9.8  1/2016  10.7**  6/2015  8.73  5/2014  9.9  2/2014  10.3*  10/2013 9.2*    *prompted TRUS biopsy  **prompted MRI guided biopsy    Imaging  CT a/p   9/18/2019  I personally reviewed and  interpreted the images and report.  IMPRESSION:    This is the third episode of acute diverticulitis within the past 2  years, today associated with localized free air in the mesentery. No  abscess formation is seen.    Assessment  Mr. Álvarez is a 64 year old male who follows up with low risk prostate cancer on active surveillance since 2013 and gross hematuria.    PSA is stable.    Discussed his prostate cancer history including the 3 biopsies he has undergone including one MRI guided biopsy.    Plan  Follow up PSA q6 months.  Follow-up sooner if gross hematuria recurs.

## 2020-07-06 NOTE — NURSING NOTE
Pt presents to clinic for 6 month history of prostate cancer    Review of Systems:    Weight loss:    No     Recent fever/chills:  No   Night sweats:   No  Current skin rash:  No   Recent hair loss:  No  Heat intolerance:  No   Cold intolerance:  No  Chest pain:   No   Palpitations:   No  Shortness of breath:  No   Wheezing:   No  Constipation:    No   Diarrhea:   No   Nausea:   No   Vomiting:   No   Kidney/side pain:  No   Back pain:   No  Frequent headaches:  No   Dizziness:     No  Leg swelling:   No   Calf pain:    No

## 2020-08-03 DIAGNOSIS — J30.1 ALLERGIC RHINITIS DUE TO POLLEN, UNSPECIFIED SEASONALITY: ICD-10-CM

## 2020-08-05 RX ORDER — FLUTICASONE PROPIONATE 50 MCG
SPRAY, SUSPENSION (ML) NASAL
Qty: 16 ML | Refills: 3 | Status: SHIPPED | OUTPATIENT
Start: 2020-08-05 | End: 2021-06-08

## 2020-08-05 NOTE — TELEPHONE ENCOUNTER
Prescription approved per [facility] [facility].  LOV: 2/202020  Leah Preciado RN on 8/5/2020 at 2:54 PM

## 2020-08-26 ENCOUNTER — TELEPHONE (OUTPATIENT)
Dept: INTERNAL MEDICINE | Facility: OTHER | Age: 65
End: 2020-08-26

## 2020-08-26 DIAGNOSIS — S46.812A STRAIN OF LEFT TRAPEZIUS MUSCLE, INITIAL ENCOUNTER: Primary | ICD-10-CM

## 2020-08-26 NOTE — TELEPHONE ENCOUNTER
Reason for call: Request for a referral.    Referral requested for what concern?  PT    Have you already been seen by the specialty you need the referral for?  No    If yes, Date:   ,  Location:   ,  Provider:       If no,  Where do you want to go?   [facility]    Additional comments:   Pt was doing PT before Covid, he would like to start again    Preferred method for responding to this message: Telephone Call    Phone number patient can be reached at? Cell number on file:    Telephone Information:   Mobile [phone]       If we can't reach you directly, may we leave a detailed response at the number you provided? Yes

## 2020-08-31 ENCOUNTER — OFFICE VISIT (OUTPATIENT)
Dept: UROLOGY | Facility: OTHER | Age: 65
End: 2020-08-31
Attending: UROLOGY
Payer: COMMERCIAL

## 2020-08-31 VITALS
HEART RATE: 68 BPM | SYSTOLIC BLOOD PRESSURE: 132 MMHG | BODY MASS INDEX: 21.24 KG/M2 | DIASTOLIC BLOOD PRESSURE: 84 MMHG | WEIGHT: 148 LBS | RESPIRATION RATE: 18 BRPM

## 2020-08-31 DIAGNOSIS — R31.0 GROSS HEMATURIA: Primary | ICD-10-CM

## 2020-08-31 LAB
ALBUMIN UR-MCNC: 10 MG/DL
APPEARANCE UR: CLEAR
BILIRUB UR QL STRIP: NEGATIVE
COLOR UR AUTO: YELLOW
GLUCOSE UR STRIP-MCNC: NEGATIVE MG/DL
HGB UR QL STRIP: ABNORMAL
KETONES UR STRIP-MCNC: NEGATIVE MG/DL
LEUKOCYTE ESTERASE UR QL STRIP: NEGATIVE
MUCOUS THREADS #/AREA URNS LPF: PRESENT /LPF
NITRATE UR QL: NEGATIVE
PH UR STRIP: 6.5 PH (ref 5–7)
RBC #/AREA URNS AUTO: 71 /HPF (ref 0–2)
SOURCE: ABNORMAL
SP GR UR STRIP: 1.03 (ref 1–1.03)
UROBILINOGEN UR STRIP-MCNC: 2 MG/DL (ref 0–2)
WBC #/AREA URNS AUTO: 1 /HPF (ref 0–5)

## 2020-08-31 PROCEDURE — 99214 OFFICE O/P EST MOD 30 MIN: CPT | Performed by: UROLOGY

## 2020-08-31 PROCEDURE — G0463 HOSPITAL OUTPT CLINIC VISIT: HCPCS

## 2020-08-31 PROCEDURE — 81001 URINALYSIS AUTO W/SCOPE: CPT | Mod: ZL | Performed by: UROLOGY

## 2020-08-31 ASSESSMENT — PAIN SCALES - GENERAL: PAINLEVEL: NO PAIN (0)

## 2020-08-31 NOTE — NURSING NOTE
Pt presents to clinic for a 6 week UA     Review of Systems:    Weight loss:    No     Recent fever/chills:  No   Night sweats:   No  Current skin rash:  No   Recent hair loss:  No  Heat intolerance:  No   Cold intolerance:  No  Chest pain:   No   Palpitations:   No  Shortness of breath:  No   Wheezing:   No  Constipation:    No   Diarrhea:   No   Nausea:   No   Vomiting:   No   Kidney/side pain:  No   Back pain:   No  Frequent headaches:  No   Dizziness:     No  Leg swelling:   No   Calf pain:    No

## 2020-08-31 NOTE — PROGRESS NOTES
Type of Visit  EST    Chief Complaint  Prostate cancer, low risk on active surveillance  Gross hematuria    HPI  Mr. Álvarez is a 65 year old male who follows up with low risk prostate cancer on active surveillance since 2013.  The patient noted hematuria a few weeks back.  UA at that time in clinic revealed a very low level of microhematuria.  Because of this I recommended he follow-up in about 6 weeks with a UA which he provided today.  He denies gross hematuria since the last visit.  He also denies flank pain and dysuria.    PCa History  The patient was initially diagnosed in 2013 at an OSH with biopsy due to a PSA of 9.2  The patient underwent repeat biopsy in 2014 which was negative as well as MRI guided prostate biopsy in 2016 which was also negative.  The patient's prior urologist was Dr. Hernandes of [facility] in the [facility].      Family History  Family History   Problem Relation Age of Onset     No Known Problems Other      Emphysema Mother      Other - See Comments Father         90, multiple medical problems     Other - See Comments Brother         MVA     Other - See Comments Brother         Multiple health problems     Heart Disease Brother         valve problems       Review of Systems  I personally reviewed the ROS with the patient.    Nursing Notes:   Kimberly Jauregui LPN  8/31/2020  2:13 PM  Signed  Pt presents to clinic for a 6 week UA     Review of Systems:    Weight loss:    No     Recent fever/chills:  No   Night sweats:   No  Current skin rash:  No   Recent hair loss:  No  Heat intolerance:  No   Cold intolerance:  No  Chest pain:   No   Palpitations:   No  Shortness of breath:  No   Wheezing:   No  Constipation:    No   Diarrhea:   No   Nausea:   No   Vomiting:   No   Kidney/side pain:  No   Back pain:   No  Frequent headaches:  No   Dizziness:     No  Leg swelling:   No   Calf pain:    No      Physical Exam  Vitals:    08/31/20 1419   BP: 132/84   BP Location: Right arm   Patient  Position: Sitting   Cuff Size: Adult Regular   Pulse: 68   Resp: 18   Weight: 67.1 kg (148 lb)      Constitutional: No acute distress.  Alert and cooperative   Head: NCAT  Eyes: Conjunctivae normal  Cardiovascular: Regular rate.  Pulmonary/Chest: Respirations are even and non-labored bilaterally, no audible wheezing  Abdominal: Soft. No distension, tenderness, masses or guarding.   Neurological: A + O x 3.  Cranial Nerves II-XII grossly intact.  Extremities: FLAQUITA x 4, Warm. No clubbing.  No cyanosis.    Skin: Pink, warm and dry.  No visible rashes noted.  Psychiatric:  Normal mood and affect  Back:  No left CVA tenderness.  No right CVA tenderness.  Genitourinary:  Nonpalpable bladder    Labs  Results for ARMOND GOEL (MRN [id number]) as of 8/31/2020 14:28   7/6/2020 13:29 8/31/2020 14:15   Color Urine Yellow Yellow   Appearance Urine Clear Clear   Glucose Urine Negative Negative   Bilirubin Urine Negative Negative   Ketones Urine Negative Negative   Specific Gravity Urine 1.028 1.033   pH Urine 6.5 6.5   Protein Albumin Urine 10 (A) 10 (A)   Urobilinogen mg/dL Normal 2.0   Nitrite Urine Negative Negative   Blood Urine Negative Moderate (A)   Leukocyte Esterase Urine Negative Negative   Source Midstream Urine Midstream Urine   WBC Urine 1 1   RBC Urine 4 (H) 71 (H)   Mucous Urine Present (A) Present (A)     Results for ARMOND GOEL (MRN [id number]) as of 7/6/2020 13:20   5/23/2019 12:58 1/6/2020 11:06 7/6/2020 10:40   PSA 8.973 (H) 12.011 (H) 9.798 (H)     Past PSAs  11/2018 9.73  5/2018  14.6  11/2017 11.6  4/2017  7.4  8/2016  9.8  1/2016  10.7**  6/2015  8.73  5/2014  9.9  2/2014  10.3*  10/2013 9.2*    *prompted TRUS biopsy  **prompted MRI guided biopsy    Imaging  CT a/p   9/18/2019  I personally reviewed and interpreted the images and report.  IMPRESSION:    This is the third episode of acute diverticulitis within the past 2  years, today associated with localized free air in the mesentery. No  abscess  formation is seen.    Assessment  Mr. Álvarez is a 65 year old male who follows up with low risk prostate cancer on active surveillance since 2013 and persistent microhematuria.  UA today reveals significant microhematuria.  Because of this I recommended a work-up and including cross-sectional imaging of the upper tracts as well as cystoscopy.  I discussed the indications, risks and benefits of this recommendation.  I also discussed the potential findings including the most likely, normal work-up.    PSA is stable.    Discussed his prostate cancer history including the 3 biopsies he has undergone including one MRI guided biopsy.    Plan  CTU and cysto complete a hematuria work-up  Follow up PSA q6 months.

## 2020-09-02 ENCOUNTER — HOSPITAL ENCOUNTER (OUTPATIENT)
Dept: CT IMAGING | Facility: OTHER | Age: 65
End: 2020-09-02
Attending: UROLOGY
Payer: COMMERCIAL

## 2020-09-02 ENCOUNTER — OFFICE VISIT (OUTPATIENT)
Dept: UROLOGY | Facility: OTHER | Age: 65
End: 2020-09-02
Attending: UROLOGY
Payer: COMMERCIAL

## 2020-09-02 VITALS — WEIGHT: 148.2 LBS | RESPIRATION RATE: 16 BRPM | BODY MASS INDEX: 21.26 KG/M2 | HEART RATE: 68 BPM

## 2020-09-02 DIAGNOSIS — R31.0 GROSS HEMATURIA: Primary | ICD-10-CM

## 2020-09-02 DIAGNOSIS — R31.0 GROSS HEMATURIA: ICD-10-CM

## 2020-09-02 PROCEDURE — G0463 HOSPITAL OUTPT CLINIC VISIT: HCPCS | Mod: 25 | Performed by: UROLOGY

## 2020-09-02 PROCEDURE — 25500064 ZZH RX 255 OP 636: Performed by: UROLOGY

## 2020-09-02 PROCEDURE — 52000 CYSTOURETHROSCOPY: CPT | Performed by: UROLOGY

## 2020-09-02 PROCEDURE — 74178 CT ABD&PLV WO CNTR FLWD CNTR: CPT

## 2020-09-02 PROCEDURE — 99212 OFFICE O/P EST SF 10 MIN: CPT | Mod: 25 | Performed by: UROLOGY

## 2020-09-02 RX ADMIN — IOHEXOL 100 ML: 350 INJECTION, SOLUTION INTRAVENOUS at 13:36

## 2020-09-02 ASSESSMENT — PAIN SCALES - GENERAL: PAINLEVEL: NO PAIN (0)

## 2020-09-02 NOTE — PATIENT INSTRUCTIONS

## 2020-09-02 NOTE — NURSING NOTE
Patient positioned in supine position, perineum area prepped with chlorhexidene Gluconate and patient draped per sterile technique. Per verbal order read back by Matt Dawson MD, Urojet 10mL 2% lidocaine jelly to be instilled into urethra.  Urojet- 10ml 2% Lidocaine jelly instilled into the urethra.    Urojet 2%  Lot#: [id number]   Expiration date: 03/2022  : [employer]  NDC: [address]-9    [facility] Protocol    A. Pre-procedure verification complete Yes  1-relevant information / documentation available, reviewed and properly matched to the patient; 2-consent accurate and complete, 3-equipment and supplies available    B. Site marking complete N/A  Site marked if not in continuous attendance with patient    C. TIME OUT completed Yes  Time Out was conducted just prior to starting procedure to verify the eight required elements: 1-patient identity, 2-consent accurate and complete, 3-position, 4-correct side/site marked (if applicable), 5-procedure, 6-relevant images / results properly labeled and displayed (if applicable), 7-antibiotics / irrigation fluids (if applicable), 8-safety precautions.    After procedure perineum area rinsed. Discharge instructions reviewed with patient. Patient verbalized understanding of discharge instructions and discharged ambulatory.  Rosalia Cespedes RN..................9/2/2020  1:49 PM

## 2020-09-02 NOTE — PROGRESS NOTES
IV Contrast- Discharge Instructions After Your CT Scan      The IV contrast you received today will be filtered from your bloodstream by your kidneys during the next 24 hours and pass from the body in urine.  You will not be aware of this process and your urine will not change in color.  To help this process you should drink at least 4 additional glasses of water or juice today.  This reduces stress on your kidneys.    Most contrast reactions are immediate.  Should you develop symptoms of concern after discharge, contact the department at the number below.  After hours you should contact your personal physician.  If you develop breathing distress or wheezing, call 911.      1.  Has the patient had a previous reaction to IV contrast? n    2.  Does the patient have kidney disease? n    3.  Is the patient on dialysis? n    If YES to any of these questions, exam will be reviewed with a Radiologist before administering contrast.  '

## 2020-09-02 NOTE — PROGRESS NOTES
Preprocedure diagnosis  Hematuria    Postprocedure diagnosis  Hematuria    Procedure  Flexible Cystourethroscopy    Surgeon  Matt Dawson MD    Anesthesia  2% lidocaine jelly intraurethrally    Complications  None    Indications  65 year old male undergoing a flexible cystoscopy for the above mentioned indications.    Findings  Cystoscopic findings included a normal anterior urethra.    There was not a prominent median lobe.    The lateral lobes were obstructive in appearance.  The bladder appeared to be normal capacity.    There were no tumors, stones or foreign bodies.    The orifices were slit-shaped and in their normal location.    Procedure  The patient was placed in supine position and prepped and draped in sterile fashion with lidocaine jelly per urethra for anesthesia.    I passed a lubricated 14F flexible cystoscope through the penile urethra and into the bladder and the bladder was completely visualized.  The cystoscope was retroflexed and the bladder neck and prostate visualized.    The cystoscope was slowly withdrawn while visualizing the urethra and the procedure terminated.    The patient tolerated the procedure well.      ^^^^^^^^^^^^^^^^^^^^^^^^^^^^^^^^^^^    Labs  Results for ARMOND GOEL (MRN [id number]) as of 8/31/2020 14:28   7/6/2020 13:29 8/31/2020 14:15   Color Urine Yellow Yellow   Appearance Urine Clear Clear   Glucose Urine Negative Negative   Bilirubin Urine Negative Negative   Ketones Urine Negative Negative   Specific Gravity Urine 1.028 1.033   pH Urine 6.5 6.5   Protein Albumin Urine 10 (A) 10 (A)   Urobilinogen mg/dL Normal 2.0   Nitrite Urine Negative Negative   Blood Urine Negative Moderate (A)   Leukocyte Esterase Urine Negative Negative   Source Midstream Urine Midstream Urine   WBC Urine 1 1   RBC Urine 4 (H) 71 (H)   Mucous Urine Present (A) Present (A)     Results for ARMOND GOEL (MRN [id number]) as of 7/6/2020 13:20   5/23/2019 12:58 1/6/2020 11:06 7/6/2020 10:40   PSA  8.973 (H) 12.011 (H) 9.798 (H)     Past PSAs  11/2018 9.73  5/2018  14.6  11/2017 11.6  4/2017  7.4  8/2016  9.8  1/2016  10.7**  6/2015  8.73  5/2014  9.9  2/2014  10.3*  10/2013 9.2*    *prompted TRUS biopsy  **prompted MRI guided biopsy    Imaging  CTU  9/2/2020  IMPRESSION:       Marked prostatomegaly. No suspicious adenopathy or osseous lesion.     7 mm nonobstructive right renal calculus.      Hemorrhagic right renal cortical cyst, Bosniak 2.    ^^^^^^^^^^^^^^^^^^^^^^^^^^^^^^^^^^^    CT a/p   9/18/2019  I personally reviewed and interpreted the images and report.  IMPRESSION:    This is the third episode of acute diverticulitis within the past 2  years, today associated with localized free air in the mesentery. No  abscess formation is seen.    Assessment  Mr. Álvarez is a 65 year old male who follows up with low risk prostate cancer on active surveillance since 2013 and persistent microhematuria.  UA today reveals significant microhematuria.  Because of this I recommended a work-up and including cross-sectional imaging of the upper tracts as well as cystoscopy.  I discussed the indications, risks and benefits of this recommendation.  I also discussed the potential findings including the most likely, normal work-up.    PSA is stable.    Discussed his prostate cancer history including the 3 biopsies he has undergone including one MRI guided biopsy.    Plan  Follow up PSA q6 months (he is due this January)            I spent 10 minutes on this patient's visit (exclusive of separately billed services/procedures) and over half of this time was spent in face-to-face counseling regarding his diagnosis, treatment options with emphasis on  risks and benefits of each, prognosis and importance of compliance.

## 2020-09-11 ENCOUNTER — HOSPITAL ENCOUNTER (OUTPATIENT)
Dept: PHYSICAL THERAPY | Facility: OTHER | Age: 65
Setting detail: THERAPIES SERIES
End: 2020-09-11
Attending: INTERNAL MEDICINE
Payer: COMMERCIAL

## 2020-09-11 DIAGNOSIS — S46.812A STRAIN OF LEFT TRAPEZIUS MUSCLE, INITIAL ENCOUNTER: ICD-10-CM

## 2020-09-11 PROCEDURE — 97161 PT EVAL LOW COMPLEX 20 MIN: CPT | Mod: GP | Performed by: PHYSICAL THERAPIST

## 2020-09-11 PROCEDURE — 97110 THERAPEUTIC EXERCISES: CPT | Mod: GP | Performed by: PHYSICAL THERAPIST

## 2020-09-11 PROCEDURE — 97140 MANUAL THERAPY 1/> REGIONS: CPT | Mod: GP | Performed by: PHYSICAL THERAPIST

## 2020-09-11 PROCEDURE — 97035 APP MDLTY 1+ULTRASOUND EA 15: CPT | Mod: GP | Performed by: PHYSICAL THERAPIST

## 2020-09-11 NOTE — PROGRESS NOTES
"   09/11/20 1400   General Information   Type of Visit Initial OP Ortho PT Evaluation   Start of Care Date 09/11/20   Referring Physician Dr Billingsley   Patient/Family Goals Statement reduce L shoulder & upper trap tightness   Orders Evaluate and Treat   Date of Order 08/26/20   Certification Required? Yes   Medical Diagnosis S46.812A (ICD-10-CM) - Strain of left trapezius muscle, initial encounter   Surgical/Medical history reviewed Yes   Precautions/Limitations no known precautions/limitations   Weight-Bearing Status - LUE full weight-bearing   Weight-Bearing Status - RUE full weight-bearing   General Information Comments please refer to pt's medical record for any additional information   Body Part(s)   Body Part(s) Cervical Spine   Presentation and Etiology   Pertinent history of current problem (include personal factors and/or comorbidities that impact the POC) Pt was seen in PT last year for a similar issue. PT helped and he is hoping it will help again. He isn't in too much pain but has \"a lot\" of stiffness in the affected area. Describes it as more of an annoyance and doesn't want this to become permanent. This is a chronic issue that he has had for years. He doesn't get headaches. He did not restart his previous HEP when the tightness returned   Impairments F. Decreased strength and endurance;A. Pain;E. Decreased flexibility;D. Decreased ROM   Functional Limitations perform activities of daily living;perform required work activities;perform desired leisure / sports activities   Symptom Location L neck & upper trap area   How/Where did it occur From insidious onset   Onset date of current episode/exacerbation 09/11/19   Pain rating (0-10 point scale) Best (/10);Worst (/10)  (more soreness than pain)   Best (/10) 5   Worst (/10) 6   Pain quality C. Aching  (stiffness)   Frequency of pain/symptoms A. Constant   Pain/symptoms are: Worse in the morning   Pain/symptoms exacerbated by G. Certain positions "   Pain/symptoms eased by E. Changing positions;F. Certain positions;G. Heat   Progression of symptoms since onset: Unchanged   Prior Level of Function   Prior Level of Function-Mobility Ind   Prior Level of Function-ADLs Ind   Current Level of Function   Current Community Support Family/friend caregiver   Patient role/employment history E. Unemployed   Living environment Apartment/condo   Current equipment-Gait/Locomotion None   Current equipment-ADL None   Fall Risk Screen   Fall screen completed by PT   Have you fallen 2 or more times in the past year? No   Have you fallen and had an injury in the past year? No   Is patient a fall risk? No;Department fall risk interventions implemented   Abuse Screen (yes response referral indicated)   Feels Unsafe at Home or Work/School no   Feels Threatened by Someone no   Does Anyone Try to Keep You From Having Contact with Others or Doing Things Outside Your Home? no   Physical Signs of Abuse Present no   Functional Scales   Functional Scales Other   Other Scales  PSFS   Cervical Spine   Observation pt is pleasant and in no acute distress   Posture forward head and rounded shoulders   Cervical Flexion ROM functional   Cervical Extension ROM functional   Cervical Right Side Bending ROM normal   Cervical Left Side Bending ROM normal   Cervical Left Rotation ROM 90% of R rotation   Shoulder Shrug (C2-C4) Strength 5/5   Shoulder Abd (C5) Strength 5/5   Shoulder ER (C5, C6) Strength 5/5   Shoulder IR (C5, C6) Strength 5/5   Elbow Flexion (C5, C6) Strength 5/5   Elbow Extension (C7) Strength 5/5   Spurling Test NEG   Cervical Distraction Test NEG   Dermatome/Sensory Testing no UE dermatome deficits   Planned Therapy Interventions   Planned Therapy Interventions joint mobilization;manual therapy;motor coordination training;neuromuscular re-education;strengthening;stretching   Planned Modality Interventions   Planned Modality Interventions Cryotherapy;Hot packs;Ultrasound   Clinical  Impression   Criteria for Skilled Therapeutic Interventions Met yes, treatment indicated   PT Diagnosis cervical strain, neck tightness; neck pain   Influenced by the following impairments decreased cervical ROM, ie L rotation   Functional limitations due to impairments difficulty with driving, ie looking over L shoulder   Clinical Presentation Stable/Uncomplicated   Clinical Decision Making (Complexity) Low complexity   Therapy Frequency 2 times/Week   Predicted Duration of Therapy Intervention (days/wks) 8 weeks   Risk & Benefits of therapy have been explained Yes   Patient, Family & other staff in agreement with plan of care Yes   Clinical Impression Comments cervical strain   Education Assessment   Preferred Learning Style Listening;Reading;Demonstration;Pictures/video   Barriers to Learning No barriers   ORTHO GOALS   PT Ortho Eval Goals 1;2;3   Ortho Goal 1   Goal Identifier Pain   Goal Description Pt to subjectively report 0/10 pain throughout the day for improved housework, ie meal prep & cleaning up dishes   Target Date 11/06/20   Ortho Goal 2   Goal Identifier Driving   Goal Description Pt to look over his L shoulder while driving without increasing stiffness   Target Date 11/06/20   Ortho Goal 3   Goal Identifier HEP   Goal Description Pt to be more consistent with HEP to prevent future flare ups   Target Date 11/06/20   Total Evaluation Time   PT Elida Low Complexity Minutes (32256) 15   Therapy Certification   Certification date from 09/11/20   Certification date to 11/06/20   Medical Diagnosis S46.812A (ICD-10-CM) - Strain of left trapezius muscle, initial encounter

## 2020-09-11 NOTE — PROGRESS NOTES
[facility]          OUTPATIENT PHYSICAL THERAPY ORTHOPEDIC EVALUATION  PLAN OF TREATMENT FOR OUTPATIENT REHABILITATION  (COMPLETE FOR INITIAL CLAIMS ONLY)  Patient's Last Name, First Name, M.I.  YOB: 1955  HenriJosué  MCKAY    Provider s Name:  [facility]   Medical Record No.  [id number]   Start of Care Date:  09/11/20   Onset Date:  09/11/19   Type:     _X__PT   ___OT   ___SLP Medical Diagnosis:  S46.812A (ICD-10-CM) - Strain of left trapezius muscle, initial encounter     PT Diagnosis:  (P) cervical strain, neck tightness; neck pain   Visits from SOC:  1      _________________________________________________________________________________  Plan of Treatment/Functional Goals:  (P) joint mobilization, manual therapy, motor coordination training, neuromuscular re-education, strengthening, stretching     (P) Cryotherapy, Hot packs, Ultrasound     Goals  Goal Identifier: (P) Pain  Goal Description: (P) Pt to subjectively report 0/10 pain throughout the day for improved housework, ie meal prep & cleaning up dishes  Target Date: (P) 11/06/20    Goal Identifier: (P) Driving  Goal Description: (P) Pt to look over his L shoulder while driving without increasing stiffness  Target Date: (P) 11/06/20    Goal Identifier: (P) HEP  Goal Description: (P) Pt to be more consistent with HEP to prevent future flare ups  Target Date: (P) 11/06/20       Therapy Frequency:  (P) 2 times/Week  Predicted Duration of Therapy Intervention:  (P) 8 weeks    Jimmy Wang, PT, ATC                 I CERTIFY THE NEED FOR THESE SERVICES FURNISHED UNDER        THIS PLAN OF TREATMENT AND WHILE UNDER MY CARE     (Physician co-signature of this document indicates review and certification of the therapy plan).                       Certification Date From:  09/11/20   Certification Date To:  11/06/20    Referring Provider:  Dr Billingsley    Initial Assessment        See Epic Evaluation Start of Care  Date: 09/11/20

## 2020-09-21 ENCOUNTER — HOSPITAL ENCOUNTER (OUTPATIENT)
Dept: PHYSICAL THERAPY | Facility: OTHER | Age: 65
Setting detail: THERAPIES SERIES
End: 2020-09-21
Attending: INTERNAL MEDICINE
Payer: COMMERCIAL

## 2020-09-21 PROCEDURE — 97035 APP MDLTY 1+ULTRASOUND EA 15: CPT | Mod: GP | Performed by: PHYSICAL THERAPIST

## 2020-09-21 PROCEDURE — 97140 MANUAL THERAPY 1/> REGIONS: CPT | Mod: GP | Performed by: PHYSICAL THERAPIST

## 2020-09-23 ENCOUNTER — HOSPITAL ENCOUNTER (OUTPATIENT)
Dept: PHYSICAL THERAPY | Facility: OTHER | Age: 65
Setting detail: THERAPIES SERIES
End: 2020-09-23
Attending: INTERNAL MEDICINE
Payer: COMMERCIAL

## 2020-09-23 PROCEDURE — 97035 APP MDLTY 1+ULTRASOUND EA 15: CPT | Mod: GP | Performed by: PHYSICAL THERAPIST

## 2020-09-23 PROCEDURE — 97140 MANUAL THERAPY 1/> REGIONS: CPT | Mod: GP | Performed by: PHYSICAL THERAPIST

## 2020-10-05 ENCOUNTER — HOSPITAL ENCOUNTER (OUTPATIENT)
Dept: PHYSICAL THERAPY | Facility: OTHER | Age: 65
Setting detail: THERAPIES SERIES
End: 2020-10-05
Attending: INTERNAL MEDICINE
Payer: COMMERCIAL

## 2020-10-05 PROCEDURE — 97035 APP MDLTY 1+ULTRASOUND EA 15: CPT | Mod: GP | Performed by: PHYSICAL THERAPIST

## 2020-10-05 PROCEDURE — 97140 MANUAL THERAPY 1/> REGIONS: CPT | Mod: GP | Performed by: PHYSICAL THERAPIST

## 2020-10-06 DIAGNOSIS — F41.9 ANXIETY: ICD-10-CM

## 2020-10-06 RX ORDER — BUSPIRONE HYDROCHLORIDE 15 MG/1
15 TABLET ORAL 2 TIMES DAILY PRN
Qty: 60 TABLET | Refills: 11 | Status: SHIPPED | OUTPATIENT
Start: 2020-10-06 | End: 2021-10-11

## 2020-10-06 NOTE — TELEPHONE ENCOUNTER
Prescription refilled per RN Medication Refill Policy..................Soha Nevarez RN 10/6/2020 4:21 PM     Principal Discharge DX:	 delivery delivered  Goal:	healthy mother and baby  Assessment and plan of treatment:	nothing in the vagina for 6 weeks (no tampons, no intercourse, no douching, no bath tubs, no swimming pools). may shower.

## 2020-10-07 ENCOUNTER — HOSPITAL ENCOUNTER (OUTPATIENT)
Dept: PHYSICAL THERAPY | Facility: OTHER | Age: 65
Setting detail: THERAPIES SERIES
End: 2020-10-07
Attending: INTERNAL MEDICINE
Payer: COMMERCIAL

## 2020-10-07 PROCEDURE — 97140 MANUAL THERAPY 1/> REGIONS: CPT | Mod: GP | Performed by: PHYSICAL THERAPIST

## 2020-10-07 PROCEDURE — 97035 APP MDLTY 1+ULTRASOUND EA 15: CPT | Mod: GP | Performed by: PHYSICAL THERAPIST

## 2020-10-12 ENCOUNTER — HOSPITAL ENCOUNTER (OUTPATIENT)
Dept: PHYSICAL THERAPY | Facility: OTHER | Age: 65
Setting detail: THERAPIES SERIES
End: 2020-10-12
Attending: INTERNAL MEDICINE
Payer: COMMERCIAL

## 2020-10-12 PROCEDURE — 97035 APP MDLTY 1+ULTRASOUND EA 15: CPT | Mod: GP

## 2020-10-12 PROCEDURE — 97140 MANUAL THERAPY 1/> REGIONS: CPT | Mod: GP

## 2020-10-15 ENCOUNTER — HOSPITAL ENCOUNTER (OUTPATIENT)
Dept: PHYSICAL THERAPY | Facility: OTHER | Age: 65
Setting detail: THERAPIES SERIES
End: 2020-10-15
Attending: INTERNAL MEDICINE
Payer: COMMERCIAL

## 2020-10-15 PROCEDURE — 97035 APP MDLTY 1+ULTRASOUND EA 15: CPT | Mod: GP | Performed by: PHYSICAL THERAPIST

## 2020-10-15 PROCEDURE — 97140 MANUAL THERAPY 1/> REGIONS: CPT | Mod: GP | Performed by: PHYSICAL THERAPIST

## 2020-10-28 ENCOUNTER — HOSPITAL ENCOUNTER (OUTPATIENT)
Dept: PHYSICAL THERAPY | Facility: OTHER | Age: 65
Setting detail: THERAPIES SERIES
End: 2020-10-28
Attending: INTERNAL MEDICINE
Payer: COMMERCIAL

## 2020-10-28 PROCEDURE — 97035 APP MDLTY 1+ULTRASOUND EA 15: CPT | Mod: GP | Performed by: PHYSICAL THERAPIST

## 2020-10-28 PROCEDURE — 97140 MANUAL THERAPY 1/> REGIONS: CPT | Mod: GP | Performed by: PHYSICAL THERAPIST

## 2020-10-30 ENCOUNTER — HOSPITAL ENCOUNTER (OUTPATIENT)
Dept: PHYSICAL THERAPY | Facility: OTHER | Age: 65
Setting detail: THERAPIES SERIES
End: 2020-10-30
Attending: INTERNAL MEDICINE
Payer: COMMERCIAL

## 2020-10-30 PROCEDURE — 97140 MANUAL THERAPY 1/> REGIONS: CPT | Mod: GP | Performed by: PHYSICAL THERAPIST

## 2020-10-30 PROCEDURE — 97035 APP MDLTY 1+ULTRASOUND EA 15: CPT | Mod: GP | Performed by: PHYSICAL THERAPIST

## 2020-10-30 NOTE — PROGRESS NOTES
Outpatient Physical Therapy Progress Note     Patient: Josué Álvarez  : 1955    Beginning/End Dates of Reporting Period:  20 to 10/30/2020    Referring Provider: Dr Billingsley    Therapy Diagnosis: S46.812A (ICD-10-CM) - Strain of left trapezius muscle, initial encounter     Client Self Report: (P) Felt good for a few hours after last session. He has his grand daughters over tonight so he is anticipating be sore tomorrow    Objective Measurements:  Objective Measure: (P) functional motion, all planes  Details: (P) cervical ROM  Objective Measure: (P) 3/10, more soreness than pain  Details: (P) subjective pain level     Goals:  Goal Identifier Pain   Goal Description Pt to subjectively report 0/10 pain throughout the day for improved housework, ie meal prep & cleaning up dishes   Target Date 20   Date Met      Progress:     Goal Identifier Driving   Goal Description Pt to look over his L shoulder while driving without increasing stiffness   Target Date 20   Date Met      Progress:     Goal Identifier HEP   Goal Description Pt to be more consistent with HEP to prevent future flare ups   Target Date 20   Date Met      Progress:       Progress Toward Goals:   Progress this reporting period: few hours of carry over relief following PT sessions. Pt is poor to semi-compliant with HEP    Plan:  Continue therapy per current plan of care.    Discharge:  No

## 2020-11-02 ENCOUNTER — HOSPITAL ENCOUNTER (OUTPATIENT)
Dept: PHYSICAL THERAPY | Facility: OTHER | Age: 65
Setting detail: THERAPIES SERIES
End: 2020-11-02
Attending: INTERNAL MEDICINE
Payer: COMMERCIAL

## 2020-11-02 PROCEDURE — 97140 MANUAL THERAPY 1/> REGIONS: CPT | Mod: GP

## 2020-11-02 PROCEDURE — 97035 APP MDLTY 1+ULTRASOUND EA 15: CPT | Mod: GP

## 2020-11-05 ENCOUNTER — OFFICE VISIT (OUTPATIENT)
Dept: INTERNAL MEDICINE | Facility: OTHER | Age: 65
End: 2020-11-05
Attending: INTERNAL MEDICINE
Payer: COMMERCIAL

## 2020-11-05 VITALS
HEART RATE: 101 BPM | DIASTOLIC BLOOD PRESSURE: 70 MMHG | BODY MASS INDEX: 21.38 KG/M2 | OXYGEN SATURATION: 96 % | WEIGHT: 149 LBS | TEMPERATURE: 98.6 F | RESPIRATION RATE: 16 BRPM | SYSTOLIC BLOOD PRESSURE: 132 MMHG

## 2020-11-05 DIAGNOSIS — R07.0 THROAT PAIN: Primary | ICD-10-CM

## 2020-11-05 DIAGNOSIS — F10.282 ALCOHOL DEPENDENCE WITH ALCOHOL-INDUCED SLEEP DISORDER (H): ICD-10-CM

## 2020-11-05 LAB
SPECIMEN SOURCE: NORMAL
STREP GROUP A PCR: NOT DETECTED

## 2020-11-05 PROCEDURE — 87651 STREP A DNA AMP PROBE: CPT | Mod: ZL | Performed by: NURSE PRACTITIONER

## 2020-11-05 PROCEDURE — 99214 OFFICE O/P EST MOD 30 MIN: CPT | Performed by: NURSE PRACTITIONER

## 2020-11-05 PROCEDURE — G0463 HOSPITAL OUTPT CLINIC VISIT: HCPCS

## 2020-11-05 RX ORDER — IBUPROFEN 800 MG/1
800 TABLET, FILM COATED ORAL EVERY 8 HOURS PRN
Qty: 15 TABLET | Refills: 0 | Status: SHIPPED | OUTPATIENT
Start: 2020-11-05 | End: 2020-11-10

## 2020-11-05 RX ORDER — MENTHOL/CETYLPYRD CL 3 MG
1 LOZENGE MUCOUS MEMBRANE
Qty: 60 LOZENGE | Refills: 0 | Status: SHIPPED | OUTPATIENT
Start: 2020-11-05 | End: 2020-11-10

## 2020-11-05 ASSESSMENT — PAIN SCALES - GENERAL: PAINLEVEL: NO PAIN (0)

## 2020-11-05 NOTE — PROGRESS NOTES
Nursing Notes:   Yulisa Alvarado LPN  11/5/2020  2:51 PM  Signed  Chief Complaint   Patient presents with     Throat Pain     Patients presents to the clinic today for throat pain, patient states he has been having a sore throat pain for two weeks.  Medication Reconciliation: completed   Yulisa PkmarySARANYA  11/5/2020 2:28 PM     Nursing note reviewed with patient.  Accuracy and completeness verified.    SUBJECTIVE:                                                      Josué Álvarez is a 65 year old year old male patient who presents to the clinic today for a sore throat for two weeks. He has been using Tylenol 500 mg, Listerine gargles, ice water.  Does report a dry mouth at night stating he drinks an entire bottle of water during the night.  He has never had a sleep study completed.  He reports he sleeps 9 to 10 hours per night with a fan directly on his head.  Patient reports he continues to drink 6+ beers per night.  Patient reports his reflux has improved.  MINI Score:  No flowsheet data found.     PHQ-2 Score:     PHQ-2 ( 1999 [employer]) 11/5/2020 2/20/2020   Q1: Little interest or pleasure in doing things 0 1   Q2: Feeling down, depressed or hopeless 1 0   PHQ-2 Score 1 1      Problem List/PMH: Reviewed in EMR, and made relevant updates today.  Medications: Reviewed in EMR, and made relevant updates today.  Allergies: Reviewed in EMR, and made relevant updates today.    Current Code Status:  Prior    REVIEW OF SYSTEMS:    Review of Systems   HENT: Positive for sore throat. Negative for dental problem, drooling, ear discharge, ear pain, hearing loss, mouth sores, postnasal drip, rhinorrhea, sinus pressure, sinus pain, trouble swallowing and voice change.    Respiratory: Negative for cough and shortness of breath.         Feels he snores, but is not aware for sure...   Neurological: Negative for dizziness, weakness and light-headedness.   Psychiatric/Behavioral: Positive for sleep disturbance. The patient is  nervous/anxious.    All other systems reviewed and are negative.      OBJECTIVE:                                                      EXAM:     /70 (BP Location: Right arm, Patient Position: Sitting, Cuff Size: Adult Regular)   Pulse 101   Temp 98.6  F (37  C) (Tympanic)   Resp 16   Wt 67.6 kg (149 lb)   SpO2 96%   BMI 21.38 kg/m      Current Pain Score: No Pain (0)     Physical Exam  Vitals signs and nursing note reviewed.   Constitutional:       Appearance: Normal appearance.   HENT:      Head: Normocephalic and atraumatic.      Right Ear: Tympanic membrane, ear canal and external ear normal.      Left Ear: Tympanic membrane, ear canal and external ear normal.      Mouth/Throat:      Mouth: Mucous membranes are moist. No oral lesions or angioedema.      Dentition: Abnormal dentition (missing teeth).      Pharynx: Oropharynx is clear. No pharyngeal swelling, oropharyngeal exudate, posterior oropharyngeal erythema or uvula swelling.   Eyes:      General: Lids are normal.      Extraocular Movements: Extraocular movements intact.      Conjunctiva/sclera:      Right eye: Right conjunctiva is injected.      Left eye: Left conjunctiva is injected.   Neck:      Musculoskeletal: Normal range of motion and neck supple.   Cardiovascular:      Rate and Rhythm: Normal rate and regular rhythm.      Heart sounds: Normal heart sounds.   Pulmonary:      Effort: Pulmonary effort is normal.      Breath sounds: Normal breath sounds.   Abdominal:      General: Bowel sounds are normal.      Palpations: Abdomen is soft.   Musculoskeletal: Normal range of motion.   Lymphadenopathy:      Cervical: No cervical adenopathy.   Skin:     General: Skin is warm and dry.   Neurological:      Mental Status: He is alert and oriented to person, place, and time. Mental status is at baseline.   Psychiatric:         Attention and Perception: Attention and perception normal.         Mood and Affect: Mood and affect normal.         Speech:  Speech normal.         Behavior: Behavior normal. Behavior is cooperative.         Thought Content: Thought content normal.         Judgment: Judgment is impulsive.     Diagnostics Completed at this Visit:  Results for orders placed or performed in visit on 11/05/20   Group A Streptococcus PCR Throat Swab     Status: None    Specimen: Throat   Result Value Ref Range    Specimen Description Throat     Strep Group A PCR Not Detected NDET^Not Detected        ASSESSMENT/PLAN:                                                      1. Throat pain  - Group A Streptococcus PCR Throat Swab, negative  - Menthol (CEPACOL REGULAR STRENGTH) 3 MG lozenge; Place 1 lozenge inside cheek every 2 hours as needed for moderate pain  - ibuprofen (ADVIL/MOTRIN) 800 MG tablet; Take 1 tablet (800 mg) by mouth every 8 hours as needed for moderate pain    Alcohol dependence with alcohol-induced sleep disorder (H)  MUST STOP DRINKING. He acknowledges he needs to stop drinking.  -I feel his alcohol intake and the fact that he most likely is a mouth breather puts him at higher risk and I feel he should have a sleep study completed.  This was recommended to him and he is agreeable.    I also recommend low dose chest CT for lung cancer screening due to past medical history of smoking.  He can let us know if he wishes to pursue this.  He was also reminded of the recommended screenings such as hepatitis C and HIV screening which he is due for.    Patient verbalizes understanding and is agreeable to plan of care.    Return if symptoms worsen or fail to improve.       KEKE Holloway, AGNP-C  Internal Medicine  [facility]    11/12/2020 12:40 PM    Portions of this note were dictated using speech recognition software. The note has been proofread but errors in the text may have been overlooked. Please contact me if there are any concerns regarding the accuracy of the dictation.

## 2020-11-05 NOTE — NURSING NOTE
Chief Complaint   Patient presents with     Throat Pain     Patients presents to the clinic today for throat pain, patient states he has been having a sore throat pain for two weeks.  Medication Reconciliation: completed   Yulisa Alvarado LPN  11/5/2020 2:28 PM

## 2020-11-06 ASSESSMENT — ENCOUNTER SYMPTOMS
LIGHT-HEADEDNESS: 0
TROUBLE SWALLOWING: 0
COUGH: 0
NERVOUS/ANXIOUS: 1
SHORTNESS OF BREATH: 0
RHINORRHEA: 0
SORE THROAT: 1
SLEEP DISTURBANCE: 1
DIZZINESS: 0
SINUS PRESSURE: 0
VOICE CHANGE: 0
SINUS PAIN: 0
WEAKNESS: 0

## 2020-12-02 ENCOUNTER — HOSPITAL ENCOUNTER (OUTPATIENT)
Dept: PHYSICAL THERAPY | Facility: OTHER | Age: 65
Setting detail: THERAPIES SERIES
End: 2020-12-02
Attending: INTERNAL MEDICINE
Payer: COMMERCIAL

## 2020-12-02 PROCEDURE — 97140 MANUAL THERAPY 1/> REGIONS: CPT | Mod: GP | Performed by: PHYSICAL THERAPIST

## 2020-12-02 PROCEDURE — 97035 APP MDLTY 1+ULTRASOUND EA 15: CPT | Mod: GP | Performed by: PHYSICAL THERAPIST

## 2020-12-10 DIAGNOSIS — C61 PROSTATE CANCER (H): Primary | ICD-10-CM

## 2020-12-10 NOTE — PROGRESS NOTES
"Per last office visit  8/31/20 with Matt Dawson MD \"Plan  CTU and cysto complete a hematuria work-up  Follow up PSA q6 months.          Orders Placed    "

## 2020-12-30 NOTE — PROGRESS NOTES
Outpatient Physical Therapy Discharge Note     Patient: Josué Álvarez  : 1955    Beginning/End Dates:  20 to 20    Referring Provider: Dr Billingsley    Therapy Diagnosis: S46.812A (ICD-10-CM) - Strain of left trapezius muscle, initial encounter     Plan:  Discharge from therapy.    Discharge:   Pt has not returned to physical therapy after a trial of self mgmt techniques indicating that he is doing well with HEP. Please refer to pt's chart for most recent information on objective measurements, goals or any additional information. This is an unplanned DC    Reason for Discharge:   Patient has not scheduled further appointments.    Discharge Plan: Patient to continue home program.

## 2021-01-11 ENCOUNTER — OFFICE VISIT (OUTPATIENT)
Dept: UROLOGY | Facility: OTHER | Age: 66
End: 2021-01-11
Attending: UROLOGY
Payer: COMMERCIAL

## 2021-01-11 VITALS
RESPIRATION RATE: 16 BRPM | WEIGHT: 153.6 LBS | HEART RATE: 92 BPM | BODY MASS INDEX: 22.04 KG/M2 | SYSTOLIC BLOOD PRESSURE: 118 MMHG | DIASTOLIC BLOOD PRESSURE: 86 MMHG

## 2021-01-11 DIAGNOSIS — R97.20 ELEVATED PSA: Primary | ICD-10-CM

## 2021-01-11 DIAGNOSIS — K21.00 GASTROESOPHAGEAL REFLUX DISEASE WITH ESOPHAGITIS: ICD-10-CM

## 2021-01-11 DIAGNOSIS — M10.9 ACUTE GOUT INVOLVING TOE OF RIGHT FOOT, UNSPECIFIED CAUSE: ICD-10-CM

## 2021-01-11 DIAGNOSIS — C61 PROSTATE CANCER (H): ICD-10-CM

## 2021-01-11 LAB — PSA SERPL-MCNC: 10.56 NG/ML

## 2021-01-11 PROCEDURE — 99214 OFFICE O/P EST MOD 30 MIN: CPT | Performed by: UROLOGY

## 2021-01-11 PROCEDURE — G0463 HOSPITAL OUTPT CLINIC VISIT: HCPCS

## 2021-01-11 PROCEDURE — 36415 COLL VENOUS BLD VENIPUNCTURE: CPT | Mod: ZL | Performed by: UROLOGY

## 2021-01-11 PROCEDURE — 84153 ASSAY OF PSA TOTAL: CPT | Mod: ZL | Performed by: UROLOGY

## 2021-01-11 RX ORDER — IBUPROFEN 800 MG/1
800 TABLET, FILM COATED ORAL EVERY 8 HOURS PRN
Qty: 100 TABLET | Refills: 1 | Status: SHIPPED | OUTPATIENT
Start: 2021-01-11 | End: 2021-10-31

## 2021-01-11 ASSESSMENT — PAIN SCALES - GENERAL: PAINLEVEL: WORST PAIN (10)

## 2021-01-11 NOTE — PROGRESS NOTES
Type of Visit  EST    Chief Complaint  Prostate cancer, low risk on active surveillance  Gout flare    HPI  Mr. Álvarez is a 65 year old male who follows up with low risk prostate cancer on active surveillance since 2013.  Patient underwent a PSA earlier today and follows up to review the results.  Since the last surveillance visit the patient did develop persistent hematuria and underwent a work-up including CT urogram and cystoscopy which was negative.  Specifically the patient denies bone or pelvic pain as well as unintentional weight loss.    Over the last 24 hours the patient has developed severe right foot pain, specifically his greater toe.  He has a history of gouty attack and the symptoms are the same as past gouty attacks.  He has used NSAIDs in the past with success.  He is asking for prescription for ibuprofen.    PCa History  The patient was initially diagnosed in 2013 with biopsy due to a PSA of 9.2  The patient underwent repeat biopsy in 2014 which was negative as well as MRI guided prostate biopsy in 2016 which was also negative.  The patient's prior urologist was Dr. Hernandes of [facility] in the [facility].      Family History  Family History   Problem Relation Age of Onset     No Known Problems Other      Emphysema Mother      Other - See Comments Father         90, multiple medical problems     Other - See Comments Brother         MVA     Other - See Comments Brother         Multiple health problems     Heart Disease Brother         valve problems       Review of Systems  I personally reviewed the ROS with the patient.    Nursing Notes:   Shara Troy RN  1/11/2021  2:43 PM  Signed  Review of Systems:    Weight loss:    No     Recent fever/chills:  No   Night sweats:   No  Current skin rash:  No   Recent hair loss:  No  Heat intolerance:  No   Cold intolerance:  No  Chest pain:   No   Palpitations:   No  Shortness of breath:  No   Wheezing:   No  Constipation:     No   Diarrhea:   No   Nausea:   No   Vomiting:   No   Kidney/side pain:  No   Back pain:   No  Frequent headaches:  No   Dizziness:     No  Leg swelling:   No   Calf pain:    No      Physical Exam  Vitals:    01/11/21 1439   BP: 118/86   BP Location: Right arm   Patient Position: Sitting   Cuff Size: Adult Regular   Pulse: 92   Resp: 16   Weight: 69.7 kg (153 lb 9.6 oz)      Constitutional: No acute distress.  Alert and cooperative   Head: NCAT  Eyes: Conjunctivae normal  Cardiovascular: Regular rate.  Pulmonary/Chest: Respirations are even and non-labored bilaterally, no audible wheezing  Abdominal: Soft. No distension, tenderness, masses or guarding.   Neurological: A + O x 3.  Cranial Nerves II-XII grossly intact.  Extremities: FLAQUITA x 4, Warm. No clubbing.  No cyanosis.    Skin: Pink, warm and dry.  No visible rashes noted.  Psychiatric:  Normal mood and affect  Back:  No left CVA tenderness.  No right CVA tenderness.  Genitourinary:  Nonpalpable bladder    Labs  Results for ARMOND ÁLVAREZ (MRN [id number]) as of 1/11/2021 14:37   1/11/2021 12:50   PSA 10.561 (H)     Results for ARMOND ÁLVAREZ (MRN [id number]) as of 7/6/2020 13:20   5/23/2019 12:58 1/6/2020 11:06 7/6/2020 10:40   PSA 8.973 (H) 12.011 (H) 9.798 (H)     Past PSAs  11/2018 9.73  5/2018  14.6  11/2017 11.6  4/2017  7.4  8/2016  9.8  1/2016  10.7**  6/2015  8.73  5/2014  9.9  2/2014  10.3*  10/2013 9.2*    *prompted TRUS biopsy  **prompted MRI guided biopsy    Imaging  CT a/p   9/18/2019  I personally reviewed and interpreted the images and report.  IMPRESSION:    This is the third episode of acute diverticulitis within the past 2  years, today associated with localized free air in the mesentery. No  abscess formation is seen.    Assessment  Mr. Álvarez is a 64 year old male who follows up with low risk prostate cancer on active surveillance since 2013.  He has a continues to be stable -I reassured the patient.  Discussed his prostate cancer history  including the 3 biopsies he has undergone including one MRI guided biopsy.    Also discussed his gouty attack and the patient does plan to eventually follow-up with his PCP.    Plan  Prescription for ibuprofen 800 mg sent to pharmacy for gout attack  Follow up PSA q6 months.

## 2021-01-11 NOTE — TELEPHONE ENCOUNTER
[facility] sent Rx request for the following:   omeprazole (PRILOSEC) 20 MG DR capsule   Sig:TAKE 1 CAPSULE BY MOUTH EVERY DAY    Last Prescription Date:   01/06/2020  Last Fill Qty/Refills:         90, R-3    Last Office Visit:              11/05/2020 ([place])   Future Office visit:           01/15/2021 ([place])   PPI Protocol Passed - 1/11/2021  3:30 PM     Prescription approved per [facility] [place].  Mela Arteaga RN ....................  1/11/2021   4:35 PM

## 2021-01-29 ENCOUNTER — HOSPITAL ENCOUNTER (OUTPATIENT)
Dept: GENERAL RADIOLOGY | Facility: OTHER | Age: 66
End: 2021-01-29
Attending: INTERNAL MEDICINE
Payer: COMMERCIAL

## 2021-01-29 ENCOUNTER — OFFICE VISIT (OUTPATIENT)
Dept: INTERNAL MEDICINE | Facility: OTHER | Age: 66
End: 2021-01-29
Attending: INTERNAL MEDICINE
Payer: COMMERCIAL

## 2021-01-29 VITALS
BODY MASS INDEX: 22.6 KG/M2 | TEMPERATURE: 98.2 F | WEIGHT: 152.6 LBS | HEIGHT: 69 IN | SYSTOLIC BLOOD PRESSURE: 122 MMHG | DIASTOLIC BLOOD PRESSURE: 88 MMHG | OXYGEN SATURATION: 97 % | RESPIRATION RATE: 16 BRPM | HEART RATE: 90 BPM

## 2021-01-29 DIAGNOSIS — E78.5 HYPERLIPIDEMIA, UNSPECIFIED HYPERLIPIDEMIA TYPE: Primary | ICD-10-CM

## 2021-01-29 DIAGNOSIS — Z87.891 HISTORY OF TOBACCO ABUSE: ICD-10-CM

## 2021-01-29 DIAGNOSIS — Z86.0101 H/O ADENOMATOUS POLYP OF COLON: ICD-10-CM

## 2021-01-29 DIAGNOSIS — F10.21 ALCOHOL DEPENDENCE IN REMISSION (H): ICD-10-CM

## 2021-01-29 DIAGNOSIS — F41.9 ANXIETY: ICD-10-CM

## 2021-01-29 DIAGNOSIS — M54.2 NECK PAIN: ICD-10-CM

## 2021-01-29 DIAGNOSIS — I70.0 ABDOMINAL AORTIC ATHEROSCLEROSIS (H): ICD-10-CM

## 2021-01-29 DIAGNOSIS — Z87.891 PERSONAL HISTORY OF TOBACCO USE: ICD-10-CM

## 2021-01-29 DIAGNOSIS — K21.9 GASTROESOPHAGEAL REFLUX DISEASE WITHOUT ESOPHAGITIS: ICD-10-CM

## 2021-01-29 LAB
ALBUMIN SERPL-MCNC: 4.4 G/DL (ref 3.5–5.7)
ALP SERPL-CCNC: 44 U/L (ref 34–104)
ALT SERPL W P-5'-P-CCNC: 34 U/L (ref 7–52)
ANION GAP SERPL CALCULATED.3IONS-SCNC: 7 MMOL/L (ref 3–14)
AST SERPL W P-5'-P-CCNC: 26 U/L (ref 13–39)
BILIRUB SERPL-MCNC: 0.9 MG/DL (ref 0.3–1)
BUN SERPL-MCNC: 15 MG/DL (ref 7–25)
CALCIUM SERPL-MCNC: 9.6 MG/DL (ref 8.6–10.3)
CHLORIDE SERPL-SCNC: 102 MMOL/L (ref 98–107)
CHOLEST SERPL-MCNC: 236 MG/DL
CO2 SERPL-SCNC: 30 MMOL/L (ref 21–31)
CREAT SERPL-MCNC: 0.86 MG/DL (ref 0.7–1.3)
GFR SERPL CREATININE-BSD FRML MDRD: 90 ML/MIN/{1.73_M2}
GLUCOSE SERPL-MCNC: 99 MG/DL (ref 70–105)
HDLC SERPL-MCNC: 54 MG/DL (ref 23–92)
LDLC SERPL CALC-MCNC: 152 MG/DL
NONHDLC SERPL-MCNC: 182 MG/DL
POTASSIUM SERPL-SCNC: 4 MMOL/L (ref 3.5–5.1)
PROT SERPL-MCNC: 7.6 G/DL (ref 6.4–8.9)
SODIUM SERPL-SCNC: 139 MMOL/L (ref 134–144)
TRIGL SERPL-MCNC: 148 MG/DL

## 2021-01-29 PROCEDURE — 36415 COLL VENOUS BLD VENIPUNCTURE: CPT | Mod: ZL | Performed by: INTERNAL MEDICINE

## 2021-01-29 PROCEDURE — G0463 HOSPITAL OUTPT CLINIC VISIT: HCPCS | Mod: 25

## 2021-01-29 PROCEDURE — 80061 LIPID PANEL: CPT | Mod: ZL | Performed by: INTERNAL MEDICINE

## 2021-01-29 PROCEDURE — 86803 HEPATITIS C AB TEST: CPT | Mod: ZL | Performed by: INTERNAL MEDICINE

## 2021-01-29 PROCEDURE — G0463 HOSPITAL OUTPT CLINIC VISIT: HCPCS

## 2021-01-29 PROCEDURE — 72040 X-RAY EXAM NECK SPINE 2-3 VW: CPT

## 2021-01-29 PROCEDURE — 99215 OFFICE O/P EST HI 40 MIN: CPT | Performed by: INTERNAL MEDICINE

## 2021-01-29 PROCEDURE — 80053 COMPREHEN METABOLIC PANEL: CPT | Mod: ZL | Performed by: INTERNAL MEDICINE

## 2021-01-29 PROCEDURE — G0296 VISIT TO DETERM LDCT ELIG: HCPCS | Performed by: INTERNAL MEDICINE

## 2021-01-29 RX ORDER — ATORVASTATIN CALCIUM 20 MG/1
20 TABLET, FILM COATED ORAL AT BEDTIME
Qty: 90 TABLET | Refills: 3 | Status: SHIPPED | OUTPATIENT
Start: 2021-01-29 | End: 2022-02-14

## 2021-01-29 RX ORDER — LORAZEPAM 1 MG/1
1 TABLET ORAL DAILY PRN
Qty: 30 TABLET | Refills: 0 | Status: SHIPPED | OUTPATIENT
Start: 2021-01-29 | End: 2022-07-07

## 2021-01-29 SDOH — HEALTH STABILITY: MENTAL HEALTH: HOW OFTEN DO YOU HAVE 6 OR MORE DRINKS ON ONE OCCASION?: NOT ASKED

## 2021-01-29 SDOH — HEALTH STABILITY: MENTAL HEALTH: HOW MANY STANDARD DRINKS CONTAINING ALCOHOL DO YOU HAVE ON A TYPICAL DAY?: 1 OR 2

## 2021-01-29 ASSESSMENT — ENCOUNTER SYMPTOMS
FEVER: 0
HEMATURIA: 0
DIARRHEA: 0
BRUISES/BLEEDS EASILY: 0
WOUND: 0
RHINORRHEA: 0
SORE THROAT: 0
SLEEP DISTURBANCE: 0
EYE PAIN: 0
TREMORS: 0
VOICE CHANGE: 0
NUMBNESS: 0
ABDOMINAL PAIN: 0
JOINT SWELLING: 0
UNEXPECTED WEIGHT CHANGE: 0
NAUSEA: 0
SINUS PAIN: 0
SEIZURES: 0
EYE REDNESS: 0
SPEECH DIFFICULTY: 0
APPETITE CHANGE: 0
HALLUCINATIONS: 0
PALPITATIONS: 0
DIFFICULTY URINATING: 1
FREQUENCY: 0
ABDOMINAL DISTENTION: 0
WHEEZING: 0
AGITATION: 0
FLANK PAIN: 0
NECK STIFFNESS: 0
CONSTIPATION: 0
NECK PAIN: 1
SHORTNESS OF BREATH: 0
DIAPHORESIS: 0
NERVOUS/ANXIOUS: 1
SINUS PRESSURE: 0
HEADACHES: 0
DIZZINESS: 0
BLOOD IN STOOL: 0
FATIGUE: 0
CONFUSION: 0
WEAKNESS: 0
ARTHRALGIAS: 1
TROUBLE SWALLOWING: 0
ADENOPATHY: 0
VOMITING: 0
DYSURIA: 0
BACK PAIN: 0
CHILLS: 0
LIGHT-HEADEDNESS: 0
CHEST TIGHTNESS: 0
COUGH: 0

## 2021-01-29 ASSESSMENT — PAIN SCALES - GENERAL: PAINLEVEL: EXTREME PAIN (8)

## 2021-01-29 ASSESSMENT — MIFFLIN-ST. JEOR: SCORE: 1468.6

## 2021-01-29 NOTE — LETTER
February 1, 2021      Josué Álvarez  [address]  [place] [address]        Dear Josué,    Below are the results of your recent labs:    Results for orders placed or performed during the hospital encounter of 01/29/21   XR Cervical Spine 2/3 Views     Status: None    Narrative    XR CERVICAL SPINE 2/3 VWS    HISTORY: 65 years Male Neck pain    COMPARISON: None    TECHNIQUE: Cervical spine 3 views    FINDINGS: There is no concerning prevertebral soft tissue edema.  Vertebral body height and disc height are well-maintained throughout.  There is no evidence of subluxation. There is multilevel facet  osteoarthritis of moderate or slightly greater severity.      Impression    IMPRESSION: Multilevel facet osteoarthritis. There is no evidence of  significant degenerative disc disease otherwise. There is no evidence  of fracture or subluxation of the cervical spine.    ADA FINN MD   Results for orders placed or performed in visit on 01/29/21   Hepatitis C antibody     Status: None   Result Value Ref Range    Hepatitis C Antibody Nonreactive NR^Nonreactive   Lipid Profile     Status: Abnormal   Result Value Ref Range    Cholesterol 236 (H) <200 mg/dL    Triglycerides 148 <150 mg/dL    HDL Cholesterol 54 23 - 92 mg/dL    LDL Cholesterol Calculated 152 (H) <100 mg/dL    Non HDL Cholesterol 182 (H) <130 mg/dL   Comprehensive metabolic panel     Status: None   Result Value Ref Range    Sodium 139 134 - 144 mmol/L    Potassium 4.0 3.5 - 5.1 mmol/L    Chloride 102 98 - 107 mmol/L    Carbon Dioxide 30 21 - 31 mmol/L    Anion Gap 7 3 - 14 mmol/L    Glucose 99 70 - 105 mg/dL    Urea Nitrogen 15 7 - 25 mg/dL    Creatinine 0.86 0.70 - 1.30 mg/dL    GFR Estimate 90 >60 mL/min/[1.73_m2]    GFR Estimate If Black >90 >60 mL/min/[1.73_m2]    Calcium 9.6 8.6 - 10.3 mg/dL    Bilirubin Total 0.9 0.3 - 1.0 mg/dL    Albumin 4.4 3.5 - 5.7 g/dL    Protein Total 7.6 6.4 - 8.9 g/dL    Alkaline Phosphatase 44 34 - 104 U/L     ALT 34 7 - 52 U/L    AST 26 13 - 39 U/L        Aside from your cholesterol being elevated, everything else looks normal.  If you have any questions about your results, feel free to contact me.  Make sure that you continue to take your cholesterol-lowering medication on a daily basis.    Sincerely,        Frank Billingsley MD  Internal Medicine  [facility]

## 2021-01-29 NOTE — PROGRESS NOTES
Chief Complaint:  This patient is here for a comprehensive review of their multiple medical problems, renewal of medications and update on necessary health maintenance issues.      HPI: This patient comes in today for complete evaluation.  He has a history of alcohol abuse.  He is trying to quit drinking but he finds that he is extremely anxious.  He would like to have something to help with the anxiety while he gets over the desire to drink.  He does take BuSpar but he feels that he needs something in addition to for short-term to help him get through so that he does not relapse.    He has a history of cigarette smoking.  We have talked in the past about doing some evaluation including CT scan of the lung.  He quit 7 years ago.  He tells me today that he would be interested in proceeding with screening for lung cancer with a CT.    He has a history of prostate cancer.  This is on active surveillance with Dr. Dawson.  His PSA has remained stable for a number of years.  He has a recent history of gout.  This is probably made worse by his alcohol.  He has some flexion contractures in his hands and I pointed out to him that that would be from the alcohol as well.    He has a lot of trouble with neck pain.  He has been to physical therapy for this.  He has not had any imaging done.  There is no radicular component.    He has a history of hyperlipidemia.  He is on low intensity statin therapy.  He is due for some lab work today.  He is not having any cardiac symptoms at all with this.    Medications are reconciled.  Past medical history, past surgical history, family history and social histories are reviewed and updated.  Colonoscopy is up-to-date.  Immunizations are up-to-date.    Past Medical History:   Diagnosis Date     Abdominal aortic atherosclerosis (H)      Anxiety      Cyst of right kidney      Diverticulitis of intestine without perforation or abscess without bleeding     No Comments Provided     Gout of foot       History of tobacco abuse      Hyperlipidemia      Prostate cancer (H)      Tubular adenoma        Past Surgical History:   Procedure Laterality Date     ARTHROSCOPY SHOULDER RT/LT Left 1980s     BIOPSY PROSTATE TRANSRECTAL  10/2016     COLONOSCOPY N/A 4/6/2018    F/U Colonoscopy 2023; tubular adenoma       Current Outpatient Medications   Medication Sig Dispense Refill     amitriptyline (ELAVIL) 10 MG tablet Take 1 tablet (10 mg) by mouth At Bedtime 90 tablet 3     ASPIRIN ADULT LOW STRENGTH PO Take 81 mg by mouth daily       atorvastatin (LIPITOR) 20 MG tablet Take 1 tablet (20 mg) by mouth At Bedtime 90 tablet 3     busPIRone (BUSPAR) 15 MG tablet TAKE 1 TABLET (15 MG) BY MOUTH 2 TIMES DAILY AS NEEDED (FOR ANXIETY) 60 tablet 11     fish oil-omega-3 fatty acids 1000 MG capsule Take 2 g by mouth daily       fluticasone (FLONASE) 50 MCG/ACT nasal spray INSTILL 2 SPRAYS INTO EACH NOSTRIL ONCE DAILY FOR CHRONIC NASAL CONGESTION AND POLYP 16 mL 3     ibuprofen (ADVIL/MOTRIN) 800 MG tablet Take 1 tablet (800 mg) by mouth every 8 hours as needed for moderate pain (take scheduled x 3 days then as needed) 100 tablet 1     LORazepam (ATIVAN) 1 MG tablet Take 1 tablet (1 mg) by mouth daily as needed for anxiety 30 tablet 0     omeprazole (PRILOSEC) 20 MG DR capsule TAKE 1 CAPSULE BY MOUTH EVERY DAY 90 capsule 0       No Known Allergies    Family History   Problem Relation Age of Onset     No Known Problems Other      Emphysema Mother      Other - See Comments Father         91, multiple medical problems     Other - See Comments Brother         MVA     Other - See Comments Brother         Multiple health problems     Heart Disease Brother         valve problems       Social History     Socioeconomic History     Marital status: Single     Spouse name: Not on file     Number of children: Not on file     Years of education: Not on file     Highest education level: Not on file   Occupational History     Not on file   Social  Needs     Financial resource strain: Not on file     Food insecurity     Worry: Not on file     Inability: Not on file     Transportation needs     Medical: Not on file     Non-medical: Not on file   Tobacco Use     Smoking status: Former Smoker     Quit date: 10/30/2014     Years since quittin.2     Smokeless tobacco: Former User   Substance and Sexual Activity     Alcohol use: Yes     Frequency: 2-3 times a week     Drinks per session: 1 or 2     Comment: occasional     Drug use: No     Comment: Drug use: No     Sexual activity: Not Currently   Lifestyle     Physical activity     Days per week: Not on file     Minutes per session: Not on file     Stress: Not on file   Relationships     Social connections     Talks on phone: Not on file     Gets together: Not on file     Attends [employer] service: Not on file     Active member of club or organization: Not on file     Attends meetings of clubs or organizations: Not on file     Relationship status: Not on file     Intimate partner violence     Fear of current or ex partner: Not on file     Emotionally abused: Not on file     Physically abused: Not on file     Forced sexual activity: Not on file   Other Topics Concern     Not on file   Social History Narrative    Moved to [place] from [place], ex wife lives here.  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       Review of Systems   Constitutional: Negative for appetite change, chills, diaphoresis, fatigue, fever and unexpected weight change.   HENT: Negative for ear pain, rhinorrhea, sinus pressure, sinus pain, sore throat, trouble swallowing and voice change.    Eyes: Negative for pain, redness and visual disturbance.   Respiratory: Negative for cough, chest tightness, shortness of breath and wheezing.    Cardiovascular: Negative for chest pain, palpitations and leg swelling.   Gastrointestinal: Negative for abdominal distention, abdominal pain, blood in stool, constipation, diarrhea, nausea and vomiting.    Endocrine: Negative for cold intolerance and heat intolerance.   Genitourinary: Positive for difficulty urinating. Negative for dysuria, flank pain, frequency and hematuria.   Musculoskeletal: Positive for arthralgias and neck pain. Negative for back pain, joint swelling and neck stiffness.   Skin: Negative for rash and wound.   Allergic/Immunologic: Negative for immunocompromised state.   Neurological: Negative for dizziness, tremors, seizures, syncope, speech difficulty, weakness, light-headedness, numbness and headaches.   Hematological: Negative for adenopathy. Does not bruise/bleed easily.   Psychiatric/Behavioral: Negative for agitation, behavioral problems, confusion, hallucinations and sleep disturbance. The patient is nervous/anxious.        Physical Exam  Vitals signs and nursing note reviewed.   Constitutional:       General: He is not in acute distress.     Appearance: He is well-developed. He is not diaphoretic.   HENT:      Head: Normocephalic.      Right Ear: Tympanic membrane, ear canal and external ear normal.      Left Ear: Tympanic membrane, ear canal and external ear normal.      Nose: Nose normal.      Mouth/Throat:      Pharynx: No oropharyngeal exudate.   Eyes:      Conjunctiva/sclera: Conjunctivae normal.      Pupils: Pupils are equal, round, and reactive to light.   Neck:      Musculoskeletal: Normal range of motion and neck supple.      Thyroid: No thyroid mass or thyromegaly.      Vascular: Normal carotid pulses. No carotid bruit or JVD.      Trachea: No tracheal deviation.   Cardiovascular:      Rate and Rhythm: Normal rate and regular rhythm.      Heart sounds: Normal heart sounds. No murmur. No friction rub. No gallop.    Pulmonary:      Effort: Pulmonary effort is normal. No respiratory distress.      Breath sounds: Normal breath sounds. No stridor. No decreased breath sounds, wheezing, rhonchi or rales.   Abdominal:      General: Bowel sounds are normal. There is no distension.       Palpations: Abdomen is soft. There is no mass.      Tenderness: There is no abdominal tenderness. There is no guarding or rebound.      Hernia: No hernia is present.   Musculoskeletal: Normal range of motion.      Right lower leg: No edema.      Left lower leg: No edema.   Lymphadenopathy:      Cervical: No cervical adenopathy.   Skin:     General: Skin is warm and dry.      Findings: No rash.   Neurological:      Mental Status: He is alert and oriented to person, place, and time.      Cranial Nerves: No cranial nerve deficit.      Sensory: No sensory deficit.      Motor: No abnormal muscle tone.      Coordination: Coordination normal.      Deep Tendon Reflexes: Reflexes normal.         Assessment:      ICD-10-CM    1. Hyperlipidemia, unspecified hyperlipidemia type  E78.5 Comprehensive metabolic panel     Lipid Profile     Hepatitis C antibody     atorvastatin (LIPITOR) 20 MG tablet   2. Anxiety  F41.9 LORazepam (ATIVAN) 1 MG tablet   3. H/O adenomatous polyp of colon  Z86.010    4. Gastroesophageal reflux disease without esophagitis  K21.9    5. Alcohol dependence in remission (H)  F10.21    6. History of tobacco abuse  Z87.891    7. Abdominal aortic atherosclerosis (H)  I70.0    8. Personal history of tobacco use  Z87.891 Prof Fee: Shared Decision Making Visit for Lung Cancer Screening     CT Chest Lung Cancer Scrn Low Dose wo   9. Neck pain  M54.2 XR Cervical Spine 2/3 Views     PHYSICAL THERAPY REFERRAL        Plan: His exam today is basically unremarkable.  His neck x-ray today showed mild osteoarthritis, nothing significant.  He wants to try some more physical therapy at this time.  I recommended heat as well.  Notify if not improving with the neck discomfort.  All of his other medical problems appear to be stable.  He is trying to quit alcohol and this is causing significant anxiety so I did agree to give him 1 prescription of lorazepam to use as needed just to keep him out of the liquor store.  I am not  going to refill this medication for him and I made that clear.  Complete lab drawn and pending, I will send him a letter with the results and any recommendations.  Continue to follow-up with urology as scheduled.  CT scan of the chest is scheduled for screening for lung cancer.    Over 50% of a 44 minute visit were spent in face-to-face time, previsit planning, obtaining and reviewing history, counseling and education, ordering tests and procedures and documenting clinical information into the electronic health record.          Lung Cancer Screening Shared Decision Making Visit     Josué Álvarez is eligible for lung cancer screening on the basis of the information provided in my signed lung cancer screening order.     I have discussed with patient the risks and benefits of screening for lung cancer with low-dose CT.     The risks include:  radiation exposure: one low dose chest CT has as much ionizing radiation as about 15 chest x-rays or 6 months of background radiation living in [place]    false positives: 96% of positive findings/nodules are NOT cancer, but some might still require additional diagnostic evaluation, including biopsy  over-diagnosis: some slow growing cancers that might never have been clinically significant will be detected and treated unnecessarily     The benefit of early detection of lung cancer is contingent upon adherence to annual screening or more frequent follow up if indicated.     Furthermore, reaping the benefits of screening requires Josué Álvarez to be willing and physically able to undergo diagnostic procedures, if indicated. Although no specific guide is available for determining severity of comorbidities, it is reasonable to withhold screening in patients who have greater mortality risk from other diseases.     We did discuss that the only way to prevent lung cancer is to not smoke. Smoking cessation counseling was not given.      I did not offer risk estimation using a  calculator such as this one:    ShouldIScreen

## 2021-01-29 NOTE — PATIENT INSTRUCTIONS

## 2021-01-29 NOTE — NURSING NOTE
"Chief Complaint   Patient presents with     Annual Visit       Initial /88 (BP Location: Right arm, Patient Position: Chair, Cuff Size: Adult Regular)   Pulse 90   Temp 98.2  F (36.8  C) (Tympanic)   Resp 16   Ht 1.746 m (5' 8.75\")   Wt 69.2 kg (152 lb 9.6 oz)   SpO2 97%   BMI 22.70 kg/m   Estimated body mass index is 22.7 kg/m  as calculated from the following:    Height as of this encounter: 1.746 m (5' 8.75\").    Weight as of this encounter: 69.2 kg (152 lb 9.6 oz).  Medication Reconciliation: complete      Cyn Giordano LPN on 1/29/2021 at 1:06 PM   "

## 2021-02-01 LAB — HCV AB SERPL QL IA: NONREACTIVE

## 2021-02-08 ENCOUNTER — TELEPHONE (OUTPATIENT)
Dept: INTERNAL MEDICINE | Facility: OTHER | Age: 66
End: 2021-02-08

## 2021-02-08 NOTE — TELEPHONE ENCOUNTER
Patient called and received his lab results and has questions regarding them.  Please call    Marylou Alicea on 2/8/2021 at 1:19 PM

## 2021-02-08 NOTE — TELEPHONE ENCOUNTER
After birth date and last name were verified, reviewed his results letter and answered all his questions.  He will follow up as needed.  Valeria Proctor CMA ([facility])

## 2021-02-10 ENCOUNTER — HOSPITAL ENCOUNTER (OUTPATIENT)
Dept: CT IMAGING | Facility: OTHER | Age: 66
Discharge: HOME | End: 2021-02-10
Attending: INTERNAL MEDICINE | Admitting: INTERNAL MEDICINE
Payer: COMMERCIAL

## 2021-02-10 DIAGNOSIS — Z87.891 PERSONAL HISTORY OF TOBACCO USE: ICD-10-CM

## 2021-02-10 PROCEDURE — 71271 CT THORAX LUNG CANCER SCR C-: CPT

## 2021-02-24 ENCOUNTER — HOSPITAL ENCOUNTER (OUTPATIENT)
Dept: PHYSICAL THERAPY | Facility: OTHER | Age: 66
Setting detail: THERAPIES SERIES
End: 2021-02-24
Attending: INTERNAL MEDICINE
Payer: COMMERCIAL

## 2021-02-24 DIAGNOSIS — M54.2 NECK PAIN: ICD-10-CM

## 2021-02-24 PROCEDURE — 97110 THERAPEUTIC EXERCISES: CPT | Mod: GP | Performed by: PHYSICAL THERAPIST

## 2021-02-24 PROCEDURE — 97035 APP MDLTY 1+ULTRASOUND EA 15: CPT | Mod: GP | Performed by: PHYSICAL THERAPIST

## 2021-02-24 PROCEDURE — 97161 PT EVAL LOW COMPLEX 20 MIN: CPT | Mod: GP | Performed by: PHYSICAL THERAPIST

## 2021-02-24 NOTE — INITIAL ASSESSMENTS
02/24/21 1400   General Information   Type of Visit Initial OP Ortho PT Evaluation   Start of Care Date 02/24/21   Referring Physician Analilia   Patient/Family Goals Statement less pain,    Orders Evaluate and Treat   Certification Required? Yes   Medical Diagnosis Neck pain M54.2    Surgical/Medical history reviewed Yes   Body Part(s)   Body Part(s) Cervical Spine   Presentation and Etiology   Pertinent history of current problem (include personal factors and/or comorbidities that impact the POC) Patient is 65-year-old male who has had at least 2 years worth of of neck pain he states probably longer.  He says it is mostly on the left side from cervical spine down into the upper trap region.  Stiffness soreness and loss of strength reported to the patient he rates the pain at 4 out of 10 constant all the time having some nagging nuisance pain.  It is made worse when he sleeps wrong or turns his head too quickly.  Limited with daily self-cares and some other activities of recreation.  Heat helps and massage.  Has had PT in the past with some success.  Currently retired not working living nearby in town home.  He enjoys walking daily tolerates well 45 minutes of walking.   Progression of symptoms since onset: Unchanged   Prior Level of Function   Functional Level Prior Comment Some decrease in cervical rotation and head positioning.   Fall Risk Screen   Fall screen completed by PT   Is patient a fall risk? No   Abuse Screen (yes response referral indicated)   Feels Unsafe at Home or Work/School no   Feels Threatened by Someone no   Does Anyone Try to Keep You From Having Contact with Others or Doing Things Outside Your Home? no   Physical Signs of Abuse Present no   Cervical Spine   Posture poor posture, forward head and rounded shoulder   Cervical Flexion ROM full ROM   Cervical Extension ROM full ROM some sorenes at UT/C5/6   Cervical Right Side Bending ROM 35 left side sore   Cervical Left Side Bending ROM 35  left side sore   Cervical Right Rotation ROM 80   Cervical Left Rotation ROM 70, left soreness   Thoracic Extension ROM decreased ROM and stiff into extension.    Thoracic Right Rotation Mildly tight   Thoracic Left Rotation Mildly tight   Shoulder AROM Screen Good shoulder range of motion   Shoulder/Wrist/Hand Strength Comments Scapular retraction strength slightly decreased but overall good upper extremity strength   Upper Trapezius Flexibility Mildly tight left and right   Levator Scapula Flexibility Mildly tight left and right   Scalene Flexibility Mild to moderately tight bilaterally   Pectoralis Minor Flexibility Mild tightness   Vertebral Artery Test Negative   Alar Ligament Test Negative   Transverse Ligament Test Negative   Spurling Test Negative   Neck Flexor Endurance Test (normal 39 sec) Specifically not tested but gets fatigued with chin tucks.   Palpation Some tension within the upper trap bilaterally left more than right   Planned Therapy Interventions   Planned Therapy Interventions joint mobilization;manual therapy;neuromuscular re-education;prosthetic fitting/training;strengthening;stretching   Planned Modality Interventions   Planned Modality Interventions Cryotherapy;Hot packs;Electrical stimulation;Ultrasound;Traction   Planned Modality Interventions Comments Per PT judgment and patient need   Clinical Impression   Criteria for Skilled Therapeutic Interventions Met yes, treatment indicated   PT Diagnosis Needs to improve his posture to reduce strain on cervical spine joints as well as improve thoracic mobility and range of motion to reduce stiffness throughout thoracic and cervical spine.   Influenced by the following impairments Decreased tolerance to sleeping positions   Functional limitations due to impairments Aching pain constantly limits some tolerance to activity and positions   Clinical Presentation Stable/Uncomplicated   Clinical Decision Making (Complexity) Low complexity   Predicted  Duration of Therapy Intervention (days/wks) 1-2 times a week for 8 to 12 weeks   Risk & Benefits of therapy have been explained Yes   Patient, Family & other staff in agreement with plan of care Yes   ORTHO GOALS   PT Ortho Eval Goals 1;2;3   Ortho Goal 1   Goal Identifier Home exercise program   Goal Description Patient to maintain home exercise program at least 3 times a week to improve cervical and thoracic spine health   Target Date 03/26/21   Ortho Goal 2   Goal Identifier Range of motion   Goal Description Thoracic extension range of motion will improve to allow a neutral spine as well as cervical rotation stretch to be equal bilaterally to reduce strain on cervical joints and upper trap   Target Date 04/23/21   Ortho Goal 3   Goal Identifier Pain   Goal Description Patient will have reduction in pain to no more than 2 out of 10 throughout the day of normal activities   Target Date 04/16/21   Total Evaluation Time   PT Eval, Low Complexity Minutes (64192) 30   Therapy Certification   Certification date from 02/24/21   Certification date to 04/23/21   Medical Diagnosis Neck pain M54.2

## 2021-02-24 NOTE — PROGRESS NOTES
[facility]          OUTPATIENT PHYSICAL THERAPY ORTHOPEDIC EVALUATION  PLAN OF TREATMENT FOR OUTPATIENT REHABILITATION  (COMPLETE FOR INITIAL CLAIMS ONLY)  Patient's Last Name, First Name, M.I.  YOB: 1955  Josué Álvarez    Provider s Name:  [facility]   Medical Record No.  [id number]   Start of Care Date:  02/24/21   Onset Date:      Type:     _X__PT   ___OT   ___SLP Medical Diagnosis:  Neck pain M54.2      PT Diagnosis:  Needs to improve his posture to reduce strain on cervical spine joints as well as improve thoracic mobility and range of motion to reduce stiffness throughout thoracic and cervical spine.   Visits from SOC:  1      _________________________________________________________________________________  Plan of Treatment/Functional Goals:  joint mobilization, manual therapy, neuromuscular re-education, prosthetic fitting/training, strengthening, stretching     Cryotherapy, Hot packs, Electrical stimulation, Ultrasound, Traction  Per PT judgment and patient need  Goals  Goal Identifier: Home exercise program  Goal Description: Patient to maintain home exercise program at least 3 times a week to improve cervical and thoracic spine health  Target Date: 03/26/21    Goal Identifier: Range of motion  Goal Description: Thoracic extension range of motion will improve to allow a neutral spine as well as cervical rotation stretch to be equal bilaterally to reduce strain on cervical joints and upper trap  Target Date: 04/23/21    Goal Identifier: Pain  Goal Description: Patient will have reduction in pain to no more than 2 out of 10 throughout the day of normal activities  Target Date: 04/16/21                      Therapy Frequency:     Predicted Duration of Therapy Intervention:  1-2 times a week for 8 to 12 weeks    Hang Sheehan, PT                 I CERTIFY THE NEED FOR THESE SERVICES FURNISHED UNDER        THIS PLAN OF TREATMENT  AND WHILE UNDER MY CARE     (Physician co-signature of this document indicates review and certification of the therapy plan).                       Certification Date From:  02/24/21   Certification Date To:  04/23/21    Referring Provider:  Analilia    Initial Assessment        See Epic Evaluation Start of Care Date: 02/24/21

## 2021-03-03 ENCOUNTER — HOSPITAL ENCOUNTER (OUTPATIENT)
Dept: PHYSICAL THERAPY | Facility: OTHER | Age: 66
Setting detail: THERAPIES SERIES
End: 2021-03-03
Attending: INTERNAL MEDICINE
Payer: COMMERCIAL

## 2021-03-03 PROCEDURE — 97140 MANUAL THERAPY 1/> REGIONS: CPT | Mod: GP | Performed by: PHYSICAL THERAPIST

## 2021-03-03 PROCEDURE — 97110 THERAPEUTIC EXERCISES: CPT | Mod: GP | Performed by: PHYSICAL THERAPIST

## 2021-03-03 PROCEDURE — 97035 APP MDLTY 1+ULTRASOUND EA 15: CPT | Mod: GP | Performed by: PHYSICAL THERAPIST

## 2021-03-10 ENCOUNTER — HOSPITAL ENCOUNTER (OUTPATIENT)
Dept: PHYSICAL THERAPY | Facility: OTHER | Age: 66
Setting detail: THERAPIES SERIES
End: 2021-03-10
Attending: INTERNAL MEDICINE
Payer: COMMERCIAL

## 2021-03-10 PROCEDURE — 97140 MANUAL THERAPY 1/> REGIONS: CPT | Mod: GP

## 2021-03-10 PROCEDURE — 97112 NEUROMUSCULAR REEDUCATION: CPT | Mod: GP

## 2021-03-15 ENCOUNTER — TELEPHONE (OUTPATIENT)
Dept: INTERNAL MEDICINE | Facility: OTHER | Age: 66
End: 2021-03-15

## 2021-03-15 DIAGNOSIS — K57.20 DIVERTICULITIS OF LARGE INTESTINE WITH PERFORATION WITHOUT BLEEDING: Primary | ICD-10-CM

## 2021-03-15 NOTE — TELEPHONE ENCOUNTER
Pt called wanting to see if you could prescribe  fiber powder? If so please abiola into [facility], please call if questions, thank you!    Fauzia Apodaca on 3/15/2021 at 1:39 PM

## 2021-03-18 ENCOUNTER — TELEPHONE (OUTPATIENT)
Dept: INTERNAL MEDICINE | Facility: OTHER | Age: 66
End: 2021-03-18

## 2021-03-18 DIAGNOSIS — K57.20 DIVERTICULITIS OF LARGE INTESTINE WITH PERFORATION WITHOUT BLEEDING: ICD-10-CM

## 2021-03-18 DIAGNOSIS — C61 PROSTATE CANCER (H): Primary | ICD-10-CM

## 2021-03-18 NOTE — PROGRESS NOTES
"Per last office visit  1/11/21 with Matt Dawson MD \"zaki  Prescription for ibuprofen 800 mg sent to pharmacy for gout attack  Follow up PSA q6 months.\"        Orders Placed    "

## 2021-03-23 NOTE — TELEPHONE ENCOUNTER
Patient requesting refill for Citrucel to be called in at [facility].  Stephanie Morgan LPN on 3/23/2021 at 11:12 AM

## 2021-03-25 ENCOUNTER — TELEPHONE (OUTPATIENT)
Dept: INTERNAL MEDICINE | Facility: OTHER | Age: 66
End: 2021-03-25

## 2021-03-25 DIAGNOSIS — K57.20 DIVERTICULITIS OF LARGE INTESTINE WITH PERFORATION WITHOUT BLEEDING: Primary | ICD-10-CM

## 2021-03-26 NOTE — TELEPHONE ENCOUNTER
Patient called and states that his insurance won't cover Citrucel, but will cover Metamucil.  Teed up new order.  Stephanie Morgan LPN on 3/26/2021 at 12:13 PM

## 2021-04-05 ENCOUNTER — HOSPITAL ENCOUNTER (OUTPATIENT)
Dept: PHYSICAL THERAPY | Facility: OTHER | Age: 66
Setting detail: THERAPIES SERIES
End: 2021-04-05
Attending: INTERNAL MEDICINE
Payer: COMMERCIAL

## 2021-04-05 PROCEDURE — 97112 NEUROMUSCULAR REEDUCATION: CPT | Mod: GP

## 2021-04-05 PROCEDURE — 97140 MANUAL THERAPY 1/> REGIONS: CPT | Mod: GP

## 2021-04-07 DIAGNOSIS — K21.00 GASTROESOPHAGEAL REFLUX DISEASE WITH ESOPHAGITIS: ICD-10-CM

## 2021-04-08 NOTE — TELEPHONE ENCOUNTER
omeprazole (PRILOSEC) 20 MG DR capsule  Sig: TAKE 1 CAPSULE BY MOUTH EVERY DAY  PPI Protocol Passed  Prescription refilled per RN Medication Refill Policy.................... Veronica Taylor RN ....................  4/8/2021   9:57 AM

## 2021-04-14 ENCOUNTER — HOSPITAL ENCOUNTER (OUTPATIENT)
Dept: PHYSICAL THERAPY | Facility: OTHER | Age: 66
Setting detail: THERAPIES SERIES
End: 2021-04-14
Attending: INTERNAL MEDICINE
Payer: COMMERCIAL

## 2021-04-14 PROCEDURE — 97112 NEUROMUSCULAR REEDUCATION: CPT | Mod: GP

## 2021-04-14 PROCEDURE — 97140 MANUAL THERAPY 1/> REGIONS: CPT | Mod: GP

## 2021-04-15 ENCOUNTER — IMMUNIZATION (OUTPATIENT)
Dept: FAMILY MEDICINE | Facility: OTHER | Age: 66
End: 2021-04-15
Attending: FAMILY MEDICINE
Payer: COMMERCIAL

## 2021-04-15 PROCEDURE — 91300 PR COVID VAC PFIZER DIL RECON 30 MCG/0.3 ML IM: CPT

## 2021-04-19 ENCOUNTER — HOSPITAL ENCOUNTER (OUTPATIENT)
Dept: PHYSICAL THERAPY | Facility: OTHER | Age: 66
Setting detail: THERAPIES SERIES
End: 2021-04-19
Attending: INTERNAL MEDICINE
Payer: COMMERCIAL

## 2021-04-19 PROCEDURE — 97112 NEUROMUSCULAR REEDUCATION: CPT | Mod: GP

## 2021-04-19 PROCEDURE — 97140 MANUAL THERAPY 1/> REGIONS: CPT | Mod: GP

## 2021-04-27 ENCOUNTER — HOSPITAL ENCOUNTER (OUTPATIENT)
Dept: PHYSICAL THERAPY | Facility: OTHER | Age: 66
Setting detail: THERAPIES SERIES
End: 2021-04-27
Attending: INTERNAL MEDICINE
Payer: COMMERCIAL

## 2021-04-27 PROCEDURE — 97112 NEUROMUSCULAR REEDUCATION: CPT | Mod: GP

## 2021-04-27 PROCEDURE — 97140 MANUAL THERAPY 1/> REGIONS: CPT | Mod: GP

## 2021-04-28 NOTE — PROGRESS NOTES
Rehabilitation Services      OUTPATIENT PHYSICAL THERAPY  PLAN OF TREATMENT FOR OUTPATIENT REHABILITATION    Patient's Last Name, First Name, MADRYAN.                YOB: 1955  Josué Álvarez                        Provider's Name  China Fernandes, PT Medical Record No.  [id number]                               Onset Date: 1/29/21   Start of Care Date: 2/24/21   Type:     _X_PT   ___OT   ___SLP Medical Diagnosis:  Neck pain                       PT Diagnosis: Needs to improve his posture to reduce strain on cervical spine joints as well as improve thoracic mobility and range of motion to reduce stiffness throughout thoracic and cervical spine.      _________________________________________________________________________________  Plan of Treatment:    Frequency/Duration: 1 times per week up to 3 months  Manual therapy, neuromuscular re-education, strengthening, stretching     Goals:  Goal Identifier Home exercise program   Goal Description Patient to maintain home exercise program at least 3 times a week to improve cervical and thoracic spine health   [facility] Date 03/26/21   Date Met      Progress: patient continues to work on HEP to aid in management of symptoms     Goal Identifier Range of motion   Goal Description Thoracic extension range of motion will improve to allow a neutral spine as well as cervical rotation stretch to be equal bilaterally to reduce strain on cervical joints and upper trap   Target Date 04/23/21   Date Met      Progress: progressing, noted limitation with thoracic spine extension that will likely be chronic due to degenerative and bony changes; noted improved facilitation of low traps, reduce hyperactivity of upper traps and therefore, pain.      Goal Identifier Pain   Goal Description Patient will have reduction in pain to no more than 2 out of 10 throughout the day of normal activities   Target  Date 04/16/21   Date Met      Progress: reporting pain at 2/10 at most with daily activities.        Progress Toward Goals:   Progress this reporting period: Patient is presenting with reduced mechanical dysfunctions of thoracic and cervical spine, improve stabilization mechanics of upper back and shoulders to reduce over use of upper traps. Will continue for a few more visits to maximize function and strength and then discontinue with HEP.           Certification date from 4/24/21 to 7/23/21.    China Fernandes, PT          I CERTIFY THE NEED FOR THESE SERVICES FURNISHED UNDER        THIS PLAN OF TREATMENT AND WHILE UNDER MY CARE     (Physician co-signature of this document indicates review and certification of the therapy plan).                Referring Provider: Frank Billingsley MD

## 2021-05-06 ENCOUNTER — IMMUNIZATION (OUTPATIENT)
Dept: FAMILY MEDICINE | Facility: OTHER | Age: 66
End: 2021-05-06
Attending: FAMILY MEDICINE
Payer: COMMERCIAL

## 2021-05-06 PROCEDURE — 0002A PR COVID VAC PFIZER DIL RECON 30 MCG/0.3 ML IM: CPT

## 2021-05-19 ENCOUNTER — HOSPITAL ENCOUNTER (OUTPATIENT)
Dept: PHYSICAL THERAPY | Facility: OTHER | Age: 66
Setting detail: THERAPIES SERIES
End: 2021-05-19
Attending: INTERNAL MEDICINE
Payer: COMMERCIAL

## 2021-05-19 PROCEDURE — 97140 MANUAL THERAPY 1/> REGIONS: CPT | Mod: GP

## 2021-05-19 PROCEDURE — 97112 NEUROMUSCULAR REEDUCATION: CPT | Mod: GP

## 2021-06-03 ENCOUNTER — HOSPITAL ENCOUNTER (OUTPATIENT)
Dept: PHYSICAL THERAPY | Facility: OTHER | Age: 66
Setting detail: THERAPIES SERIES
End: 2021-06-03
Attending: INTERNAL MEDICINE
Payer: COMMERCIAL

## 2021-06-03 PROCEDURE — 97140 MANUAL THERAPY 1/> REGIONS: CPT | Mod: GP

## 2021-06-03 PROCEDURE — 97112 NEUROMUSCULAR REEDUCATION: CPT | Mod: GP

## 2021-06-05 DIAGNOSIS — J30.1 ALLERGIC RHINITIS DUE TO POLLEN, UNSPECIFIED SEASONALITY: ICD-10-CM

## 2021-06-08 RX ORDER — FLUTICASONE PROPIONATE 50 MCG
SPRAY, SUSPENSION (ML) NASAL
Qty: 16 ML | Refills: 3 | Status: SHIPPED | OUTPATIENT
Start: 2021-06-08 | End: 2022-01-14

## 2021-06-08 NOTE — TELEPHONE ENCOUNTER
"Requested Prescriptions   Pending Prescriptions Disp Refills     fluticasone (FLONASE) 50 MCG/ACT nasal spray [Pharmacy Med Name: FLUTICASONE PROP 50 MCG SPRAY] 16 mL 3     Sig: INSTILL 2 SPRAYS INTO EACH NOSTRIL ONCE DAILY FOR CHRONIC NASAL CONGESTION AND POLYP       Nasal Allergy Protocol Passed - 6/5/2021  2:24 PM        Passed - Patient is age 12 or older        Passed - Recent (12 mo) or future (30 days) visit within the authorizing provider's specialty     Patient has had an office visit with the authorizing provider or a provider within the authorizing providers department within the previous 12 mos or has a future within next 30 days. See \"Patient Info\" tab in inbasket, or \"Choose Columns\" in Meds & Orders section of the refill encounter.              Passed - Medication is active on med list           LOV 1/29/21 [place]  Prescription approved per [facility] Refill Protocol.  Brenda J. Goodell, RN on 6/8/2021 at 9:18 AM    "

## 2021-07-13 ENCOUNTER — HOSPITAL ENCOUNTER (OUTPATIENT)
Dept: PHYSICAL THERAPY | Facility: OTHER | Age: 66
Setting detail: THERAPIES SERIES
End: 2021-07-13
Attending: INTERNAL MEDICINE
Payer: COMMERCIAL

## 2021-07-13 PROCEDURE — 97140 MANUAL THERAPY 1/> REGIONS: CPT | Mod: GP

## 2021-07-13 PROCEDURE — 97112 NEUROMUSCULAR REEDUCATION: CPT | Mod: GP

## 2021-09-22 ENCOUNTER — OFFICE VISIT (OUTPATIENT)
Dept: UROLOGY | Facility: OTHER | Age: 66
End: 2021-09-22
Attending: UROLOGY
Payer: MEDICARE

## 2021-09-22 ENCOUNTER — LAB (OUTPATIENT)
Dept: LAB | Facility: OTHER | Age: 66
End: 2021-09-22
Attending: UROLOGY
Payer: MEDICARE

## 2021-09-22 VITALS
WEIGHT: 151 LBS | BODY MASS INDEX: 22.46 KG/M2 | HEART RATE: 68 BPM | SYSTOLIC BLOOD PRESSURE: 122 MMHG | DIASTOLIC BLOOD PRESSURE: 88 MMHG | RESPIRATION RATE: 16 BRPM | OXYGEN SATURATION: 98 %

## 2021-09-22 DIAGNOSIS — C61 PROSTATE CANCER (H): Primary | ICD-10-CM

## 2021-09-22 DIAGNOSIS — C61 PROSTATE CANCER (H): ICD-10-CM

## 2021-09-22 LAB — PSA SERPL-MCNC: 11.12 UG/L (ref 0–4)

## 2021-09-22 PROCEDURE — G0463 HOSPITAL OUTPT CLINIC VISIT: HCPCS | Performed by: UROLOGY

## 2021-09-22 PROCEDURE — 99213 OFFICE O/P EST LOW 20 MIN: CPT | Performed by: UROLOGY

## 2021-09-22 PROCEDURE — 84153 ASSAY OF PSA TOTAL: CPT | Mod: ZL

## 2021-09-22 PROCEDURE — 36415 COLL VENOUS BLD VENIPUNCTURE: CPT | Mod: ZL

## 2021-09-22 ASSESSMENT — PAIN SCALES - GENERAL: PAINLEVEL: NO PAIN (0)

## 2021-09-22 NOTE — NURSING NOTE
Pt presents to clinic for a six month history of prostate cancer folllow up    Review of Systems:    Weight loss:    No     Recent fever/chills:  No   Night sweats:   No  Current skin rash:  No   Recent hair loss:  No  Heat intolerance:  No   Cold intolerance:  No  Chest pain:   No   Palpitations:   No  Shortness of breath:  No   Wheezing:   No  Constipation:    No   Diarrhea:   No   Nausea:   No   Vomiting:   No   Kidney/side pain:  No   Back pain:   No  Frequent headaches:  No   Dizziness:     No  Leg swelling:   No   Calf pain:    No

## 2021-09-22 NOTE — PROGRESS NOTES
Type of Visit  EST    Chief Complaint  Prostate cancer, low risk on active surveillance    HPI  Mr. Álvarez is a 66 year old male who follows up with low risk prostate cancer on active surveillance since 2013.  Patient underwent a PSA earlier today and follows up to review the results.  The patient is following up a few months past scheduled appointment as he was spending time out of town.  He denies any new symptoms.  Specifically denies new urinary symptoms, gross hematuria, unintentional weight loss, bone or pelvic pain.    PCa History  The patient was initially diagnosed in 2013 with biopsy due to a PSA of 9.2  The patient underwent repeat biopsy in 2014 which was negative as well as MRI guided prostate biopsy in 2016 which was also negative.  The patient's prior urologist was Dr. Hernandes of [facility] in the [facility].      Review of Systems  I personally reviewed the ROS with the patient.    Nursing Notes:   Kimberly Jauregui LPN  9/22/2021  2:52 PM  Signed  Pt presents to clinic for a six month history of prostate cancer folllow up    Review of Systems:    Weight loss:    No     Recent fever/chills:  No   Night sweats:   No  Current skin rash:  No   Recent hair loss:  No  Heat intolerance:  No   Cold intolerance:  No  Chest pain:   No   Palpitations:   No  Shortness of breath:  No   Wheezing:   No  Constipation:    No   Diarrhea:   No   Nausea:   No   Vomiting:   No   Kidney/side pain:  No   Back pain:   No  Frequent headaches:  No   Dizziness:     No  Leg swelling:   No   Calf pain:    No            Physical Exam  Vitals:    09/22/21 1441   BP: 122/88   BP Location: Right arm   Patient Position: Sitting   Cuff Size: Adult Regular   Pulse: 68   Resp: 16   SpO2: 98%   Weight: 68.5 kg (151 lb)      Constitutional: No acute distress.  Alert and cooperative   Head: NCAT  Eyes: Conjunctivae normal  Cardiovascular: Regular rate.  Pulmonary/Chest: Respirations are even and non-labored bilaterally, no  audible wheezing  Abdominal: Soft. No distension, tenderness, masses or guarding.   Extremities: FLAQUITA x 4, Warm. No clubbing.  No cyanosis.    Skin: Pink, warm and dry.  No visible rashes noted.  Back:  No left CVA tenderness.  No right CVA tenderness.  Genitourinary:  Nonpalpable bladder    Labs  Results for ARMOND ÁLVAREZ (MRN [id number]) as of 9/22/2021 14:52   9/22/2021 13:11   PSA 11.12 (H)     Results for ARMOND ÁLVAREZ (MRN [id number]) as of 1/11/2021 14:37   1/11/2021 12:50   PSA 10.561 (H)     Results for ARMOND ÁLVAREZ (MRN [id number]) as of 7/6/2020 13:20   5/23/2019 12:58 1/6/2020 11:06 7/6/2020 10:40   PSA 8.973 (H) 12.011 (H) 9.798 (H)     Past PSAs  11/2018 9.73  5/2018  14.6  11/2017 11.6  4/2017  7.4  8/2016  9.8  1/2016  10.7**  6/2015  8.73  5/2014  9.9  2/2014  10.3*  10/2013 9.2*    *prompted TRUS biopsy  **prompted MRI guided biopsy    Imaging  CT a/p   9/18/2019  I personally reviewed and interpreted the images and report.  IMPRESSION:    This is the third episode of acute diverticulitis within the past 2  years, today associated with localized free air in the mesentery. No  abscess formation is seen.    Assessment  Mr. Álvarez is a 66 year old male who follows up with low risk prostate cancer on active surveillance since 2013.  His PSA continues to be relatively stable when compared to the past 8 years of results.  He is highly motivated to continue active surveillance.    Plan  Follow up PSA q6 months.

## 2021-09-28 ENCOUNTER — HOSPITAL ENCOUNTER (OUTPATIENT)
Dept: CT IMAGING | Facility: OTHER | Age: 66
End: 2021-09-28
Attending: NURSE PRACTITIONER
Payer: MEDICARE

## 2021-09-28 ENCOUNTER — OFFICE VISIT (OUTPATIENT)
Dept: INTERNAL MEDICINE | Facility: OTHER | Age: 66
End: 2021-09-28
Attending: NURSE PRACTITIONER
Payer: MEDICARE

## 2021-09-28 VITALS
TEMPERATURE: 97.9 F | RESPIRATION RATE: 16 BRPM | BODY MASS INDEX: 22.49 KG/M2 | OXYGEN SATURATION: 99 % | DIASTOLIC BLOOD PRESSURE: 82 MMHG | WEIGHT: 151.2 LBS | HEART RATE: 73 BPM | SYSTOLIC BLOOD PRESSURE: 132 MMHG

## 2021-09-28 DIAGNOSIS — K57.20 DIVERTICULITIS OF LARGE INTESTINE WITH PERFORATION WITHOUT BLEEDING: ICD-10-CM

## 2021-09-28 DIAGNOSIS — K57.32 DIVERTICULITIS OF COLON: ICD-10-CM

## 2021-09-28 DIAGNOSIS — R10.32 LLQ ABDOMINAL PAIN: Primary | ICD-10-CM

## 2021-09-28 DIAGNOSIS — R10.32 LLQ ABDOMINAL PAIN: ICD-10-CM

## 2021-09-28 LAB
ALBUMIN SERPL-MCNC: 4.3 G/DL (ref 3.5–5.7)
ALBUMIN UR-MCNC: 10 MG/DL
ALP SERPL-CCNC: 44 U/L (ref 34–104)
ALT SERPL W P-5'-P-CCNC: 33 U/L (ref 7–52)
ANION GAP SERPL CALCULATED.3IONS-SCNC: 8 MMOL/L (ref 3–14)
APPEARANCE UR: CLEAR
AST SERPL W P-5'-P-CCNC: 26 U/L (ref 13–39)
BASOPHILS # BLD AUTO: 0.1 10E3/UL (ref 0–0.2)
BASOPHILS NFR BLD AUTO: 1 %
BILIRUB SERPL-MCNC: 1 MG/DL (ref 0.3–1)
BILIRUB UR QL STRIP: NEGATIVE
BUN SERPL-MCNC: 18 MG/DL (ref 7–25)
CALCIUM SERPL-MCNC: 10 MG/DL (ref 8.6–10.3)
CHLORIDE BLD-SCNC: 101 MMOL/L (ref 98–107)
CO2 SERPL-SCNC: 29 MMOL/L (ref 21–31)
COLOR UR AUTO: YELLOW
CREAT SERPL-MCNC: 0.81 MG/DL (ref 0.7–1.3)
CRP SERPL-MCNC: 20.9 MG/L
EOSINOPHIL # BLD AUTO: 0.2 10E3/UL (ref 0–0.7)
EOSINOPHIL NFR BLD AUTO: 2 %
ERYTHROCYTE [DISTWIDTH] IN BLOOD BY AUTOMATED COUNT: 12.1 % (ref 10–15)
ERYTHROCYTE [SEDIMENTATION RATE] IN BLOOD BY WESTERGREN METHOD: 16 MM/HR (ref 0–20)
GFR SERPL CREATININE-BSD FRML MDRD: >90 ML/MIN/1.73M2
GLUCOSE BLD-MCNC: 88 MG/DL (ref 70–105)
GLUCOSE UR STRIP-MCNC: NEGATIVE MG/DL
HCT VFR BLD AUTO: 40.1 % (ref 40–53)
HGB BLD-MCNC: 14.3 G/DL (ref 13.3–17.7)
HGB UR QL STRIP: NEGATIVE
IMM GRANULOCYTES # BLD: 0 10E3/UL
IMM GRANULOCYTES NFR BLD: 0 %
KETONES UR STRIP-MCNC: NEGATIVE MG/DL
LEUKOCYTE ESTERASE UR QL STRIP: NEGATIVE
LYMPHOCYTES # BLD AUTO: 1.7 10E3/UL (ref 0.8–5.3)
LYMPHOCYTES NFR BLD AUTO: 18 %
MCH RBC QN AUTO: 33.6 PG (ref 26.5–33)
MCHC RBC AUTO-ENTMCNC: 35.7 G/DL (ref 31.5–36.5)
MCV RBC AUTO: 94 FL (ref 78–100)
MONOCYTES # BLD AUTO: 1 10E3/UL (ref 0–1.3)
MONOCYTES NFR BLD AUTO: 11 %
MUCOUS THREADS #/AREA URNS LPF: PRESENT /LPF
NEUTROPHILS # BLD AUTO: 6.3 10E3/UL (ref 1.6–8.3)
NEUTROPHILS NFR BLD AUTO: 68 %
NITRATE UR QL: NEGATIVE
NRBC # BLD AUTO: 0 10E3/UL
NRBC BLD AUTO-RTO: 0 /100
PH UR STRIP: 6 [PH] (ref 5–9)
PLATELET # BLD AUTO: 182 10E3/UL (ref 150–450)
POTASSIUM BLD-SCNC: 4.2 MMOL/L (ref 3.5–5.1)
PROT SERPL-MCNC: 7.1 G/DL (ref 6.4–8.9)
RBC # BLD AUTO: 4.26 10E6/UL (ref 4.4–5.9)
RBC URINE: 1 /HPF
SODIUM SERPL-SCNC: 138 MMOL/L (ref 134–144)
SP GR UR STRIP: 1.03 (ref 1–1.03)
UROBILINOGEN UR STRIP-MCNC: NORMAL MG/DL
WBC # BLD AUTO: 9.2 10E3/UL (ref 4–11)
WBC URINE: <1 /HPF

## 2021-09-28 PROCEDURE — 80053 COMPREHEN METABOLIC PANEL: CPT | Mod: ZL | Performed by: NURSE PRACTITIONER

## 2021-09-28 PROCEDURE — 81001 URINALYSIS AUTO W/SCOPE: CPT | Mod: ZL | Performed by: NURSE PRACTITIONER

## 2021-09-28 PROCEDURE — 36415 COLL VENOUS BLD VENIPUNCTURE: CPT | Mod: ZL | Performed by: NURSE PRACTITIONER

## 2021-09-28 PROCEDURE — G0463 HOSPITAL OUTPT CLINIC VISIT: HCPCS | Mod: 25

## 2021-09-28 PROCEDURE — 85652 RBC SED RATE AUTOMATED: CPT | Mod: ZL | Performed by: NURSE PRACTITIONER

## 2021-09-28 PROCEDURE — 250N000011 HC RX IP 250 OP 636: Performed by: NURSE PRACTITIONER

## 2021-09-28 PROCEDURE — G0463 HOSPITAL OUTPT CLINIC VISIT: HCPCS

## 2021-09-28 PROCEDURE — 74177 CT ABD & PELVIS W/CONTRAST: CPT

## 2021-09-28 PROCEDURE — 85025 COMPLETE CBC W/AUTO DIFF WBC: CPT | Mod: ZL | Performed by: NURSE PRACTITIONER

## 2021-09-28 PROCEDURE — 99215 OFFICE O/P EST HI 40 MIN: CPT | Performed by: NURSE PRACTITIONER

## 2021-09-28 PROCEDURE — 86140 C-REACTIVE PROTEIN: CPT | Mod: ZL | Performed by: NURSE PRACTITIONER

## 2021-09-28 RX ORDER — PSYLLIUM HUSK 3.5 G/5.8G
POWDER TOPICAL
COMMUNITY
Start: 2021-05-12 | End: 2021-10-13

## 2021-09-28 RX ORDER — IOPAMIDOL 755 MG/ML
86 INJECTION, SOLUTION INTRAVASCULAR ONCE
Status: COMPLETED | OUTPATIENT
Start: 2021-09-28 | End: 2021-09-28

## 2021-09-28 RX ADMIN — IOPAMIDOL 86 ML: 755 INJECTION, SOLUTION INTRAVENOUS at 15:10

## 2021-09-28 ASSESSMENT — ENCOUNTER SYMPTOMS
NERVOUS/ANXIOUS: 1
ABDOMINAL PAIN: 1
FEVER: 0
DIARRHEA: 1
DIAPHORESIS: 0

## 2021-09-28 ASSESSMENT — ANXIETY QUESTIONNAIRES
7. FEELING AFRAID AS IF SOMETHING AWFUL MIGHT HAPPEN: NOT AT ALL
GAD7 TOTAL SCORE: 2
2. NOT BEING ABLE TO STOP OR CONTROL WORRYING: SEVERAL DAYS
IF YOU CHECKED OFF ANY PROBLEMS ON THIS QUESTIONNAIRE, HOW DIFFICULT HAVE THESE PROBLEMS MADE IT FOR YOU TO DO YOUR WORK, TAKE CARE OF THINGS AT HOME, OR GET ALONG WITH OTHER PEOPLE: NOT DIFFICULT AT ALL
5. BEING SO RESTLESS THAT IT IS HARD TO SIT STILL: NOT AT ALL
1. FEELING NERVOUS, ANXIOUS, OR ON EDGE: NOT AT ALL
3. WORRYING TOO MUCH ABOUT DIFFERENT THINGS: SEVERAL DAYS
6. BECOMING EASILY ANNOYED OR IRRITABLE: NOT AT ALL

## 2021-09-28 ASSESSMENT — PATIENT HEALTH QUESTIONNAIRE - PHQ9: 5. POOR APPETITE OR OVEREATING: NOT AT ALL

## 2021-09-28 ASSESSMENT — PAIN SCALES - GENERAL: PAINLEVEL: MILD PAIN (3)

## 2021-09-28 NOTE — PROGRESS NOTES
1.  Has the patient had a previous reaction to IV contrast? NO    2.  Does the patient have kidney disease? NO    3.  Is the patient on dialysis? NO    If YES to any of these questions, exam will be reviewed with a Radiologist before administering contrast.    IV Contrast- Discharge Instructions After Your CT Scan      The IV contrast you received today will be filtered from your bloodstream by your kidneys during the next 24 hours and pass from the body in urine.  You will not be aware of this process and your urine will not change in color.  To help this process you should drink at least 4 additional glasses of water or juice today.  This reduces stress on your kidneys.    Most contrast reactions are immediate.  Should you develop symptoms of concern after discharge, contact the department at the number below.  After hours you should contact your personal physician.  If you develop breathing distress or wheezing, call 911.

## 2021-09-28 NOTE — PROGRESS NOTES
"Josué Álvarez  : 1955 Age: 66 year old Sex: male MRN: [id number]    CC:   Chief Complaint   Patient presents with     Musculoskeletal Problem     Left sided Lower abdominal pain      MINI Score:    MINI-7 SCORE 2021   Total Score 2     PHQ-2 Score:     PHQ-2 (  [employer]) 2021   Q1: Little interest or pleasure in doing things 0 0   Q2: Feeling down, depressed or hopeless 0 0   PHQ-2 Score 0 0          Tobacco Use      Smoking status: Former Smoker        Quit date: 10/30/2014        Years since quittin.9      Smokeless tobacco: Former User    NURSE'S NOTES:    Nursing Notes:   Stephanie Morgan LPN  2021  2:08 PM  Signed  Chief Complaint   Patient presents with     Musculoskeletal Problem     Left sided Lower abdominal pain    Patient presents for left side lower abdomen pain,which has been going on for about 3 days.    Initial /82 (BP Location: Right arm, Patient Position: Sitting, Cuff Size: Adult Regular)   Pulse 73   Temp 97.9  F (36.6  C) (Tympanic)   Resp 16   Wt 68.6 kg (151 lb 3.2 oz)   SpO2 99%   BMI 22.49 kg/m   Estimated body mass index is 22.49 kg/m  as calculated from the following:    Height as of 21: 1.746 m (5' 8.75\").    Weight as of this encounter: 68.6 kg (151 lb 3.2 oz).  Medication Reconciliation: complete  FOOD SECURITY SCREENING QUESTIONS  Hunger Vital Signs:  Within the past 12 months we worried whether our food would run out before we got money to buy more. Never  Within the past 12 months the food we bought just didn't last and we didn't have money to get more. Never    Advance care plan reviewed      Stephanie Morgan LPN    Nursing note reviewed with patient.  Accuracy and completeness verified.      SUBJECTIVE:                                                      HPI:   Josué Álvarez presents to the clinic today with complaints of LLQ pain and diarrhea. He does have a history of diverticulitis which caused a 3 day hospitalization three " "years ago (2018). He is very fearful of having this again. He does endorse recently eating popcorn and corn.  Denies that he has been eating any nuts or seeds.  He also reports he takes daily fiber and eats a lot of salads.  He denies any fever or nausea.  Does report diarrhea for the past couple of days.  He also reports right before his appointment today he used a hot pack which took \"the edge off\" in regards to his pain.  He does endorse he continues to drink alcohol.    Josué Álvarez also presents with:    Patient Active Problem List   Diagnosis     Abdominal aortic atherosclerosis (H)     Cyst of right kidney     History of tobacco abuse     Alcohol dependence (H)     Anxiety     Hyperlipidemia     Prostate cancer on active surveillance since 2013     H/O adenomatous polyp of colon     Diverticulitis of large intestine with perforation without bleeding     Gastroesophageal reflux disease without esophagitis       Current Code Status:  Prior    REVIEW OF SYSTEMS:    Review of Systems   Constitutional: Negative for diaphoresis and fever.   Gastrointestinal: Positive for abdominal pain and diarrhea.   Psychiatric/Behavioral: The patient is nervous/anxious.    All other systems reviewed and are negative.      Problem List/PMH: Reviewed in EMR, and made relevant updates today.  Medications: Reviewed in EMR, and made relevant updates today.  Allergies: Reviewed in EMR, and made relevant updates today.    OBJECTIVE:                                                      /82 (BP Location: Right arm, Patient Position: Sitting, Cuff Size: Adult Regular)   Pulse 73   Temp 97.9  F (36.6  C) (Tympanic)   Resp 16   Wt 68.6 kg (151 lb 3.2 oz)   SpO2 99%   BMI 22.49 kg/m      Current Pain Score:   Mild Pain (3)     Physical Exam  Vitals and nursing note reviewed.   Cardiovascular:      Rate and Rhythm: Normal rate and regular rhythm.      Heart sounds: Normal heart sounds.   Pulmonary:      Effort: Pulmonary effort " is normal.      Breath sounds: Normal breath sounds.   Abdominal:      General: Bowel sounds are increased.      Palpations: Abdomen is soft. There is no hepatomegaly, splenomegaly or mass.      Tenderness: There is abdominal tenderness in the left upper quadrant and left lower quadrant. There is no left CVA tenderness or rebound.       Neurological:      Mental Status: He is alert.   Psychiatric:         Attention and Perception: Attention and perception normal.         Mood and Affect: Mood is anxious.         Speech: Speech is rapid and pressured.         Behavior: Behavior is cooperative.         Cognition and Memory: Cognition and memory normal.         Judgment: Judgment normal.          BP Readings from Last 3 Encounters:   09/28/21 132/82   09/22/21 122/88   01/29/21 122/88        Vitals:    09/28/21 1349   Weight: 68.6 kg (151 lb 3.2 oz)        The 10-year ASCVD risk score (Jagdish GARCIA Jr., et al., 2013) is: 14.3%    Values used to calculate the score:      Age: 66 years      Sex: Male      Is Non- : No      Diabetic: No      Tobacco smoker: No      Systolic Blood Pressure: 132 mmHg      Is BP treated: No      HDL Cholesterol: 54 mg/dL      Total Cholesterol: 236 mg/dL    Diagnostics Completed at this Visit:    Results for orders placed or performed during the hospital encounter of 09/28/21   CT Abdomen Pelvis w Contrast     Status: None    Narrative    PROCEDURE:  CT ABDOMEN PELVIS W CONTRAST    HISTORY: LUQ abdominal pain; Diverticulitis of large intestine with  perforation without bleeding; LLQ abdominal pain    TECHNIQUE:  Helical CT of the abdomen and pelvis was performed  following injection of intravenous contrast.    COMPARISON:  9/2/2020    MEDS/CONTRAST: 86 ml isovue 370    FINDINGS:      Limited images through the lung bases demonstrate no focal  consolidation or mass.  The heart size is normal. No pericardial or  pleural effusions are seen.    The liver demonstrates no mass  or intrahepatic biliary ductal  dilatation. The gallbladder, spleen, pancreas and adrenal glands are  unremarkable. A right renal cyst is present. Symmetric nephrograms are  present without hydronephrosis. There is no abdominal aortic aneurysm.    The bowel is normal in caliber. The appendix is normal. Focal  inflammation is present centered upon a diverticula of the descending  colon. The prostate is enlarged.    No free air or adenopathy is present.  No suspicious osseous lesions  are identified.      Impression    IMPRESSION:      Descending colon diverticulitis. No remote free air or abscess.    GOLDIE ORONA MD         SYSTEM ID:  [id number]   Results for orders placed or performed in visit on 09/28/21   UA with Microscopic reflex to Culture     Status: Abnormal    Specimen: Urine, Clean Catch   Result Value Ref Range    Color Urine Yellow Colorless, Straw, Light Yellow, Yellow    Appearance Urine Clear Clear    Glucose Urine Negative Negative mg/dL    Bilirubin Urine Negative Negative    Ketones Urine Negative Negative mg/dL    Specific Gravity Urine 1.030 1.000 - 1.030    Blood Urine Negative Negative    pH Urine 6.0 5.0 - 9.0    Protein Albumin Urine 10  (A) Negative mg/dL    Urobilinogen Urine Normal Normal, 2.0 mg/dL    Nitrite Urine Negative Negative    Leukocyte Esterase Urine Negative Negative    Mucus Urine Present (A) None Seen /LPF    RBC Urine 1 <=2 /HPF    WBC Urine <1 <=5 /HPF    Narrative    Urine Culture not indicated   CRP inflammation     Status: Abnormal   Result Value Ref Range    CRP Inflammation 20.9 (H) <10.0 mg/L   Sedimentation Rate (ESR)     Status: Normal   Result Value Ref Range    Erythrocyte Sedimentation Rate 16 0 - 20 mm/hr   Comprehensive metabolic panel     Status: Normal   Result Value Ref Range    Sodium 138 134 - 144 mmol/L    Potassium 4.2 3.5 - 5.1 mmol/L    Chloride 101 98 - 107 mmol/L    Carbon Dioxide (CO2) 29 21 - 31 mmol/L    Anion Gap 8 3 - 14 mmol/L    Urea  Nitrogen 18 7 - 25 mg/dL    Creatinine 0.81 0.70 - 1.30 mg/dL    Calcium 10.0 8.6 - 10.3 mg/dL    Glucose 88 70 - 105 mg/dL    Alkaline Phosphatase 44 34 - 104 U/L    AST 26 13 - 39 U/L    ALT 33 7 - 52 U/L    Protein Total 7.1 6.4 - 8.9 g/dL    Albumin 4.3 3.5 - 5.7 g/dL    Bilirubin Total 1.0 0.3 - 1.0 mg/dL    GFR Estimate >90 >60 mL/min/1.73m2   CBC with platelets and differential     Status: Abnormal   Result Value Ref Range    WBC Count 9.2 4.0 - 11.0 10e3/uL    RBC Count 4.26 (L) 4.40 - 5.90 10e6/uL    Hemoglobin 14.3 13.3 - 17.7 g/dL    Hematocrit 40.1 40.0 - 53.0 %    MCV 94 78 - 100 fL    MCH 33.6 (H) 26.5 - 33.0 pg    MCHC 35.7 31.5 - 36.5 g/dL    RDW 12.1 10.0 - 15.0 %    Platelet Count 182 150 - 450 10e3/uL    % Neutrophils 68 %    % Lymphocytes 18 %    % Monocytes 11 %    % Eosinophils 2 %    % Basophils 1 %    % Immature Granulocytes 0 %    NRBCs per 100 WBC 0 <1 /100    Absolute Neutrophils 6.3 1.6 - 8.3 10e3/uL    Absolute Lymphocytes 1.7 0.8 - 5.3 10e3/uL    Absolute Monocytes 1.0 0.0 - 1.3 10e3/uL    Absolute Eosinophils 0.2 0.0 - 0.7 10e3/uL    Absolute Basophils 0.1 0.0 - 0.2 10e3/uL    Absolute Immature Granulocytes 0.0 <=0.0 10e3/uL    Absolute NRBCs 0.0 10e3/uL   CBC and Differential     Status: Abnormal    Narrative    The following orders were created for panel order CBC and Differential.  Procedure                               Abnormality         Status                     ---------                               -----------         ------                     CBC with platelets and d...[[id number]]  Abnormal            Final result                 Please view results for these tests on the individual orders.        ASSESSMENT AND PLAN:    Diverticulitis of large intestine with perforation without bleeding  Resulted in hospitalization in 2018.  Patient has been good since.  Very fearful of hospitalization again.  - psyllium (METAMUCIL/KONSYL) 58.6 % powder  Dispense: 425 g; Refill: 3  - CT  Abdomen Pelvis w/o Contrast positive for diverticulitis of the descending colon    LLQ abdominal pain  Will obtain CT and labs today.  Advised patient to follow a fall to clear liquid diet until he hears from me regarding the results of his CT to allow his bowels to rest.  - CT Abdomen Pelvis w/o Contrast  - CBC and Differential  - Comprehensive metabolic panel  - Sedimentation Rate (ESR)  - CRP inflammation  - UA with Microscopic reflex to Culture     Diverticulitis of colon  - amoxicillin-clavulanate (AUGMENTIN) 875-125 MG tablet; Take 1 tablet by mouth 2 times daily for 7 days  Instructed patient he is to take a probiotic or eat yogurt 3 times a day while taking this medication and for 1 week after.  No alcohol while taking antibiotics.  Follow-up in 10 to 14 days      I explained my diagnostic considerations and recommendations to the patient, who voiced understanding and agreement with the treatment plan. All questions were answered. We discussed potential side effects of any prescribed or recommended therapies, as well as expectations for response to treatments.    Patient was advised to allow up to 2 days for response on lab results via MyChart and or letter to be sent.    FOLLOW-UP:    Return in about 2 weeks (around 10/12/2021) for Recheck.     Clinic : [phone]  Appointment line: [phone]     KEKE Holloway, AGNP-C  Internal Medicine  09/28/2021 4:38 PM  _____________________________________________________________________________________    Total time spent with this patient was 63 minutes which included chart review, visualization and interpretation of labs and/or images, time spent with patient, and documentation.    PATIENT INSTRUCTIONS:    Patient Instructions     Patient Education     Abdominal Pain  Abdominal pain is pain in the stomach or belly area. Everyone has this pain from time to time. In many cases it goes away on its own. But abdominal pain can sometimes be due to a  serious problem, such as appendicitis. So it s important to know when to get help.    Causes of abdominal pain  There are many possible causes of abdominal pain. Common causes in adults include:    Constipation, diarrhea, or gas    Stomach acid flowing back up into the esophagus (acid reflux or heartburn)    Severe acid reflux, called GERD (gastroesophageal reflux disease)    A sore in the lining of the stomach or small intestine (peptic ulcer)    Inflammation of the gallbladder, liver, or pancreas    Gallstones or kidney stones    Appendicitis     Intestinal blockage     An internal organ pushing through a muscle or other tissue (hernia)    Urinary tract infections    In women, menstrual cramps, fibroids, ovarian cysts, pelvic inflammatory disease, or endometriosis    Inflammation or infection of the intestines, including Crohn's disease and ulcerative colitis    Irritable bowel syndrome  Diagnosing the cause of abdominal pain  Your healthcare provider will give you a physical exam help find the cause of your pain. If needed, you will have tests. Belly pain has many possible causes. So it can be hard to find the reason for your pain. Giving details about your pain can help. Tell your provider where and when you feel the pain, and what makes it better or worse. Also let your provider know if you have other symptoms such as:    Fever    Tiredness    Upset stomach (nausea)    Vomiting    Changes in bathroom habits    Blood in the stool or black, tarry stool    Weight loss that you can't explain (involuntary weight loss?)  Also report any family history of stomach or intestinal problems, or cancers. Tell your provider about all your alcohol use and drug use. Tell your provider about all medicines you use, including herbs, vitamins, and supplements.  Treating abdominal pain  Some causes of pain need emergency medical treatment right away. These include appendicitis or a bowel blockage. Other problems can be treated with  rest, fluids, or medicines. Your healthcare provider can give you specific instructions for treatment or self-care based on what is causing your pain.     If you have vomiting or diarrhea, sip water or other clear fluids. When you are ready to eat solid foods again, start with small amounts of easy-to-digest, low-fat foods. These include apple sauce, toast, or crackers.  When to get medical care  Call 911 or go to the hospital right away if you:    Can t pass stool and are vomiting    Are vomiting blood or have bloody diarrhea or black, tarry diarrhea    Have chest, neck, or shoulder pain    Feel like you might pass out    Have pain in your shoulder blades with nausea    Have sudden, severe belly pain    Have new, severe pain unlike any you have felt before    Have a belly that is rigid, hard, and hurts to touch  Call your healthcare provider if you have:    Pain for more than 5 days    Bloating for more than 2 days    Diarrhea for more than 5 days    A fever of 100.4 F (38 C) or higher, or as directed by your healthcare provider    Pain that gets worse    Weight loss for no reason    Continued lack of appetite    Blood in your stool  How to prevent abdominal pain  Here are some tips to help prevent abdominal pain:    Eat smaller amounts of food at each meal.    Don't eat greasy, fried, or other high-fat foods.    Don't eat foods that give you gas.    Exercise regularly.    Drink plenty of fluids.  To help prevent GERD symptoms:    Quit smoking.    Reduce alcohol and foods that increase stomach acid.    Don't use aspirin or over-the-counter pain and fever medicines, if possible. This includes nonsteroidal anti-inflammatory drugs (NSAIDs).    Lose excess weight.    Finish eating at least 2 hours before you go to bed or lie down.    Raise the head of your bed.  [employer] last reviewed this educational content on 4/1/2019 2000-2021 [employer]. All rights reserved. This information is not intended as a  substitute for professional medical care. Always follow your healthcare professional's instructions.           Patient Education   Diet for Diverticular Disease  What is diverticular disease?   When the inner wall of your colon weakens and forms small pouches, you have diverticulosis. For most people, this causes no symptoms.   If the pouches become inflamed or infected, you have diverticulitis. Warning signs may include pain in the lower abdomen, chills and vomiting (throwing up).  These conditions are called diverticular disease.  What causes it?  The cause has been linked to many years of eating too little fiber. People who eat plenty of whole grains, fresh fruits and vegetables are less likely to get this disease.   Once the pouches have formed, there is no way to reverse them.   How much fiber should I get?  If you're healing from diverticulitis or surgery to remove the inflamed area, you will at first follow a low-fiber diet (less than 10 grams each day). Then, as your doctor advises, you may slowly add fiber. Increase your intake by 1 to 2 grams a day. Your goal will be 25 to 30 grams a day.   To avoid future problems, continue to get 25 to 30 grams of fiber each day.   How can fiber help?   A high-fiber diet will help you have regular bowel movements. Fiber adds bulk to the stool and helps it pass more easily. Fiber also helps prevent the pouches from growing larger or getting infected.  In the past, doctors have warned patients to avoid eating nuts, seeds and popcorn. They believed these foods could get caught in the pouches and inflame them. But recent studies do not support this idea.   Please ask your dietitian for the handout Fiber Content in Common Foods. It will show you how to get more fiber in your diet.  For informational purposes only. Not to replace the advice of your health care provider.   Copyright   2007 [employer] [facility]. All rights reserved. [employer] [id number] - REV 09/15.        Patient Education     Diet for Diverticular Disease  What is diverticular disease?   When the inner wall of your colon weakens and forms small pouches, you have diverticulosis. For most people, this causes no symptoms.   If the pouches become inflamed or infected, you have diverticulitis. Warning signs may include pain in the lower abdomen, chills and vomiting (throwing up).  These conditions are called diverticular disease.  What causes it?  The cause has been linked to many years of eating too little fiber. People who eat plenty of whole grains, fresh fruits and vegetables are less likely to get this disease.   Once the pouches have formed, there is no way to reverse them.   How much fiber should I get?  If you're healing from diverticulitis or surgery to remove the inflamed area, you will at first follow a low-fiber diet (less than 10 grams each day). Then, as your doctor advises, you may slowly add fiber. Increase your intake by 1 to 2 grams a day. Your goal will be 25 to 30 grams a day.   To avoid future problems, continue to get 25 to 30 grams of fiber each day.   How can fiber help?   A high-fiber diet will help you have regular bowel movements. Fiber adds bulk to the stool and helps it pass more easily. Fiber also helps prevent the pouches from growing larger or getting infected.  In the past, doctors have warned patients to avoid eating nuts, seeds and popcorn. They believed these foods could get caught in the pouches and inflame them. But recent studies do not support this idea.   Please ask your dietitian for the handout Fiber Content in Common Foods. It will show you how to get more fiber in your diet.  For informational purposes only. Not to replace the advice of your health care provider. Copyright   2007 [employer] [facility]. All rights reserved. Clinically reviewed by [employer]. [employer] [id number] - REV 09/19.

## 2021-09-28 NOTE — RESULT ENCOUNTER NOTE
Please call the patient and let him know that his CT did indicate diverticulitis of the descending colon.  I have sent in a prescription for Augmentin which she needs to  and start taking today.  He will take this for 1 week.  He is to take this with a probiotic or needs to be eating yogurt 3 times a day.  Follow-up in 10 to 14 days, sooner if not improving.  I would recommend no alcohol while taking this antibiotic.    KEKE Holloway, AGNP-C  Internal Medicine

## 2021-09-28 NOTE — PATIENT INSTRUCTIONS
Patient Education     Abdominal Pain  Abdominal pain is pain in the stomach or belly area. Everyone has this pain from time to time. In many cases it goes away on its own. But abdominal pain can sometimes be due to a serious problem, such as appendicitis. So it s important to know when to get help.    Causes of abdominal pain  There are many possible causes of abdominal pain. Common causes in adults include:    Constipation, diarrhea, or gas    Stomach acid flowing back up into the esophagus (acid reflux or heartburn)    Severe acid reflux, called GERD (gastroesophageal reflux disease)    A sore in the lining of the stomach or small intestine (peptic ulcer)    Inflammation of the gallbladder, liver, or pancreas    Gallstones or kidney stones    Appendicitis     Intestinal blockage     An internal organ pushing through a muscle or other tissue (hernia)    Urinary tract infections    In women, menstrual cramps, fibroids, ovarian cysts, pelvic inflammatory disease, or endometriosis    Inflammation or infection of the intestines, including Crohn's disease and ulcerative colitis    Irritable bowel syndrome  Diagnosing the cause of abdominal pain  Your healthcare provider will give you a physical exam help find the cause of your pain. If needed, you will have tests. Belly pain has many possible causes. So it can be hard to find the reason for your pain. Giving details about your pain can help. Tell your provider where and when you feel the pain, and what makes it better or worse. Also let your provider know if you have other symptoms such as:    Fever    Tiredness    Upset stomach (nausea)    Vomiting    Changes in bathroom habits    Blood in the stool or black, tarry stool    Weight loss that you can't explain (involuntary weight loss?)  Also report any family history of stomach or intestinal problems, or cancers. Tell your provider about all your alcohol use and drug use. Tell your provider about all medicines you  use, including herbs, vitamins, and supplements.  Treating abdominal pain  Some causes of pain need emergency medical treatment right away. These include appendicitis or a bowel blockage. Other problems can be treated with rest, fluids, or medicines. Your healthcare provider can give you specific instructions for treatment or self-care based on what is causing your pain.     If you have vomiting or diarrhea, sip water or other clear fluids. When you are ready to eat solid foods again, start with small amounts of easy-to-digest, low-fat foods. These include apple sauce, toast, or crackers.  When to get medical care  Call 911 or go to the hospital right away if you:    Can t pass stool and are vomiting    Are vomiting blood or have bloody diarrhea or black, tarry diarrhea    Have chest, neck, or shoulder pain    Feel like you might pass out    Have pain in your shoulder blades with nausea    Have sudden, severe belly pain    Have new, severe pain unlike any you have felt before    Have a belly that is rigid, hard, and hurts to touch  Call your healthcare provider if you have:    Pain for more than 5 days    Bloating for more than 2 days    Diarrhea for more than 5 days    A fever of 100.4 F (38 C) or higher, or as directed by your healthcare provider    Pain that gets worse    Weight loss for no reason    Continued lack of appetite    Blood in your stool  How to prevent abdominal pain  Here are some tips to help prevent abdominal pain:    Eat smaller amounts of food at each meal.    Don't eat greasy, fried, or other high-fat foods.    Don't eat foods that give you gas.    Exercise regularly.    Drink plenty of fluids.  To help prevent GERD symptoms:    Quit smoking.    Reduce alcohol and foods that increase stomach acid.    Don't use aspirin or over-the-counter pain and fever medicines, if possible. This includes nonsteroidal anti-inflammatory drugs (NSAIDs).    Lose excess weight.    Finish eating at least 2 hours  before you go to bed or lie down.    Raise the head of your bed.  [employer] last reviewed this educational content on 4/1/2019 2000-2021 [employer]. All rights reserved. This information is not intended as a substitute for professional medical care. Always follow your healthcare professional's instructions.           Patient Education   Diet for Diverticular Disease  What is diverticular disease?   When the inner wall of your colon weakens and forms small pouches, you have diverticulosis. For most people, this causes no symptoms.   If the pouches become inflamed or infected, you have diverticulitis. Warning signs may include pain in the lower abdomen, chills and vomiting (throwing up).  These conditions are called diverticular disease.  What causes it?  The cause has been linked to many years of eating too little fiber. People who eat plenty of whole grains, fresh fruits and vegetables are less likely to get this disease.   Once the pouches have formed, there is no way to reverse them.   How much fiber should I get?  If you're healing from diverticulitis or surgery to remove the inflamed area, you will at first follow a low-fiber diet (less than 10 grams each day). Then, as your doctor advises, you may slowly add fiber. Increase your intake by 1 to 2 grams a day. Your goal will be 25 to 30 grams a day.   To avoid future problems, continue to get 25 to 30 grams of fiber each day.   How can fiber help?   A high-fiber diet will help you have regular bowel movements. Fiber adds bulk to the stool and helps it pass more easily. Fiber also helps prevent the pouches from growing larger or getting infected.  In the past, doctors have warned patients to avoid eating nuts, seeds and popcorn. They believed these foods could get caught in the pouches and inflame them. But recent studies do not support this idea.   Please ask your dietitian for the handout Fiber Content in Common [employer]. It will show you how to  get more fiber in your diet.  For informational purposes only. Not to replace the advice of your health care provider.   Copyright   2007 [facility]. All rights reserved. [employer] 094600 - REV 09/15.       Patient Education     Diet for Diverticular Disease  What is diverticular disease?   When the inner wall of your colon weakens and forms small pouches, you have diverticulosis. For most people, this causes no symptoms.   If the pouches become inflamed or infected, you have diverticulitis. Warning signs may include pain in the lower abdomen, chills and vomiting (throwing up).  These conditions are called diverticular disease.  What causes it?  The cause has been linked to many years of eating too little fiber. People who eat plenty of whole grains, fresh fruits and vegetables are less likely to get this disease.   Once the pouches have formed, there is no way to reverse them.   How much fiber should I get?  If you're healing from diverticulitis or surgery to remove the inflamed area, you will at first follow a low-fiber diet (less than 10 grams each day). Then, as your doctor advises, you may slowly add fiber. Increase your intake by 1 to 2 grams a day. Your goal will be 25 to 30 grams a day.   To avoid future problems, continue to get 25 to 30 grams of fiber each day.   How can fiber help?   A high-fiber diet will help you have regular bowel movements. Fiber adds bulk to the stool and helps it pass more easily. Fiber also helps prevent the pouches from growing larger or getting infected.  In the past, doctors have warned patients to avoid eating nuts, seeds and popcorn. They believed these foods could get caught in the pouches and inflame them. But recent studies do not support this idea.   Please ask your dietitian for the handout Fiber Content in Common Foods. It will show you how to get more fiber in your diet.  For informational purposes only. Not to replace the advice of your health care  provider. Copyright   2007 [facility]. All rights reserved. Clinically reviewed by [employer]. [employer] 067899 - REV 09/19.

## 2021-09-28 NOTE — NURSING NOTE
"Chief Complaint   Patient presents with     Musculoskeletal Problem     Left sided Lower abdominal pain    Patient presents for left side lower abdomen pain,which has been going on for about 3 days.    Initial /82 (BP Location: Right arm, Patient Position: Sitting, Cuff Size: Adult Regular)   Pulse 73   Temp 97.9  F (36.6  C) (Tympanic)   Resp 16   Wt 68.6 kg (151 lb 3.2 oz)   SpO2 99%   BMI 22.49 kg/m   Estimated body mass index is 22.49 kg/m  as calculated from the following:    Height as of 1/29/21: 1.746 m (5' 8.75\").    Weight as of this encounter: 68.6 kg (151 lb 3.2 oz).  Medication Reconciliation: complete  FOOD SECURITY SCREENING QUESTIONS  Hunger Vital Signs:  Within the past 12 months we worried whether our food would run out before we got money to buy more. Never  Within the past 12 months the food we bought just didn't last and we didn't have money to get more. Never    Advance care plan reviewed      Stephanie Morgan LPN    "

## 2021-09-28 NOTE — PROGRESS NOTES
Outpatient Physical Therapy Discharge Note     Patient: Josué Álvarez  : 1955    Beginning/End Dates of Reporting Period:  21 to 21    Referring Provider: Frank Billingsley MD    Therapy Diagnosis: Needs to improve his posture to reduce strain on cervical spine joints as well as improve thoracic mobility and range of motion to reduce stiffness throughout thoracic and cervical spine.     Client Self Report: Has been dealing with mid cervical pain for a couple of weeks, using heat. No injuries. Stiffness, tenderness with turning head. Wondering if injections are the next step.     Objective Measurements:  Objective Measure: postural loading  Details: limited loading head R/L; general listening to mid c spine left; local listening to C3  Objective Measure: myofascial     Objective Measure: joint mobility  Details: FRS left C3  Objective Measure: cranial screen  Details: clear       Goals:  Goal Identifier Home exercise program   Goal Description Patient to maintain home exercise program at least 3 times a week to improve cervical and thoracic spine health   Target Date 21   Date Met      Progress (detail required for progress note):       Goal Identifier Range of motion   Goal Description Thoracic extension range of motion will improve to allow a neutral spine as well as cervical rotation stretch to be equal bilaterally to reduce strain on cervical joints and upper trap   Target Date 21   Date Met      Progress (detail required for progress note):       Goal Identifier Pain   Goal Description Patient will have reduction in pain to no more than 2 out of 10 throughout the day of normal activities   Target Date 21   Date Met      Progress (detail required for progress note):           Plan:  Discharge from therapy.    Discharge:    Reason for Discharge: Patient has failed to schedule further appointments. Was going to out of town for length of time.     Equipment Issued: NA    Discharge  Plan: Patient to continue home program.

## 2021-09-28 NOTE — LETTER
September 28, 2021      Josué Álvarez  [address]   [place] [address]        Dear ,    We are writing to inform you of your test results.    Resulted Orders   UA with Microscopic reflex to Culture   Result Value Ref Range    Color Urine Yellow Colorless, Straw, Light Yellow, Yellow    Appearance Urine Clear Clear    Glucose Urine Negative Negative mg/dL    Bilirubin Urine Negative Negative    Ketones Urine Negative Negative mg/dL    Specific Gravity Urine 1.030 1.000 - 1.030    Blood Urine Negative Negative    pH Urine 6.0 5.0 - 9.0    Protein Albumin Urine 10  (A) Negative mg/dL    Urobilinogen Urine Normal Normal, 2.0 mg/dL    Nitrite Urine Negative Negative    Leukocyte Esterase Urine Negative Negative    Mucus Urine Present (A) None Seen /LPF    RBC Urine 1 <=2 /HPF    WBC Urine <1 <=5 /HPF    Narrative    Urine Culture not indicated   CRP inflammation   Result Value Ref Range    CRP Inflammation 20.9 (H) <10.0 mg/L   Sedimentation Rate (ESR)   Result Value Ref Range    Erythrocyte Sedimentation Rate 16 0 - 20 mm/hr   Comprehensive metabolic panel   Result Value Ref Range    Sodium 138 134 - 144 mmol/L    Potassium 4.2 3.5 - 5.1 mmol/L    Chloride 101 98 - 107 mmol/L    Carbon Dioxide (CO2) 29 21 - 31 mmol/L    Anion Gap 8 3 - 14 mmol/L    Urea Nitrogen 18 7 - 25 mg/dL    Creatinine 0.81 0.70 - 1.30 mg/dL    Calcium 10.0 8.6 - 10.3 mg/dL    Glucose 88 70 - 105 mg/dL    Alkaline Phosphatase 44 34 - 104 U/L    AST 26 13 - 39 U/L    ALT 33 7 - 52 U/L    Protein Total 7.1 6.4 - 8.9 g/dL    Albumin 4.3 3.5 - 5.7 g/dL    Bilirubin Total 1.0 0.3 - 1.0 mg/dL    GFR Estimate >90 >60 mL/min/1.73m2      Comment:      As of July 11, 2021, eGFR is calculated by the CKD-EPI creatinine equation, without race adjustment. eGFR can be influenced by muscle mass, exercise, and diet. The reported eGFR is an estimation only and is only applicable if the renal function is stable.   CBC with platelets and  differential   Result Value Ref Range    WBC Count 9.2 4.0 - 11.0 10e3/uL    RBC Count 4.26 (L) 4.40 - 5.90 10e6/uL    Hemoglobin 14.3 13.3 - 17.7 g/dL    Hematocrit 40.1 40.0 - 53.0 %    MCV 94 78 - 100 fL    MCH 33.6 (H) 26.5 - 33.0 pg    MCHC 35.7 31.5 - 36.5 g/dL    RDW 12.1 10.0 - 15.0 %    Platelet Count 182 150 - 450 10e3/uL    % Neutrophils 68 %    % Lymphocytes 18 %    % Monocytes 11 %    % Eosinophils 2 %    % Basophils 1 %    % Immature Granulocytes 0 %    NRBCs per 100 WBC 0 <1 /100    Absolute Neutrophils 6.3 1.6 - 8.3 10e3/uL    Absolute Lymphocytes 1.7 0.8 - 5.3 10e3/uL    Absolute Monocytes 1.0 0.0 - 1.3 10e3/uL    Absolute Eosinophils 0.2 0.0 - 0.7 10e3/uL    Absolute Basophils 0.1 0.0 - 0.2 10e3/uL    Absolute Immature Granulocytes 0.0 <=0.0 10e3/uL    Absolute NRBCs 0.0 10e3/uL       If you have any questions or concerns, please call the clinic at the number listed above.       Sincerely,      Soco Banerjee NP

## 2021-09-29 ASSESSMENT — ANXIETY QUESTIONNAIRES: GAD7 TOTAL SCORE: 2

## 2021-10-06 ENCOUNTER — TELEPHONE (OUTPATIENT)
Dept: INTERNAL MEDICINE | Facility: OTHER | Age: 66
End: 2021-10-06

## 2021-10-06 DIAGNOSIS — K57.32 DIVERTICULITIS OF COLON: ICD-10-CM

## 2021-10-06 NOTE — TELEPHONE ENCOUNTER
Patient seen for LLQ abdominal pain on 9/28/2021. Diagnosed with diverticulitis and given Augmentin. Per note, patient was not to drink alcohol while taking the antibiotic. Patient states on day 4 of being on antibiotic he was feeling better and him and his significant other went to [place]. He did have some drinks then. Then on Sunday his daughter got  and he had some drinks then too. The pain is much much better but he states he feels a tiny bit of soreness (not so much pain) on his left side but in a different place the pain was originally. He states he usually is given 2-3 different medications for diverticulitis but this time he was only given Augmentin. He is wondering if Dr. Billingsley recommends refilling antibiotic? He does have a follow up scheduled 10/13/2021.     Nabila Salgado CMA on 10/6/2021 at 11:49 AM

## 2021-10-06 NOTE — TELEPHONE ENCOUNTER
Patient saw [facility] on 9/28/21 and received a prescription for amoxicillin. Patient is out and would like a refill. Can [facility] refill ? Patient is scheduled to see [facility] on 10/13/21 for a follow up.    Holly Rose on 10/6/2021 at 10:39 AM

## 2021-10-07 ENCOUNTER — TELEPHONE (OUTPATIENT)
Dept: UROLOGY | Facility: OTHER | Age: 66
End: 2021-10-07

## 2021-10-07 DIAGNOSIS — F41.9 ANXIETY: ICD-10-CM

## 2021-10-07 DIAGNOSIS — M10.9 ACUTE GOUT INVOLVING TOE OF RIGHT FOOT, UNSPECIFIED CAUSE: ICD-10-CM

## 2021-10-08 ENCOUNTER — TELEPHONE (OUTPATIENT)
Dept: INTERNAL MEDICINE | Facility: OTHER | Age: 66
End: 2021-10-08

## 2021-10-08 NOTE — TELEPHONE ENCOUNTER
States we cannot get KONSYL DAILY FIBER 60.3 % POWD anymore. Please advise change.  Princess Babin LPN .............10/8/2021  10:29 AM

## 2021-10-11 RX ORDER — BUSPIRONE HYDROCHLORIDE 15 MG/1
15 TABLET ORAL 2 TIMES DAILY PRN
Qty: 60 TABLET | Refills: 3 | Status: SHIPPED | OUTPATIENT
Start: 2021-10-11 | End: 2022-03-16

## 2021-10-13 ENCOUNTER — OFFICE VISIT (OUTPATIENT)
Dept: INTERNAL MEDICINE | Facility: OTHER | Age: 66
End: 2021-10-13
Attending: INTERNAL MEDICINE
Payer: MEDICARE

## 2021-10-13 VITALS
RESPIRATION RATE: 18 BRPM | BODY MASS INDEX: 22.02 KG/M2 | WEIGHT: 148 LBS | TEMPERATURE: 97 F | OXYGEN SATURATION: 98 % | HEART RATE: 76 BPM | DIASTOLIC BLOOD PRESSURE: 78 MMHG | SYSTOLIC BLOOD PRESSURE: 118 MMHG

## 2021-10-13 DIAGNOSIS — N52.9 ERECTILE DYSFUNCTION, UNSPECIFIED ERECTILE DYSFUNCTION TYPE: ICD-10-CM

## 2021-10-13 DIAGNOSIS — K57.20 DIVERTICULITIS OF LARGE INTESTINE WITH PERFORATION WITHOUT BLEEDING: Primary | ICD-10-CM

## 2021-10-13 PROCEDURE — G0463 HOSPITAL OUTPT CLINIC VISIT: HCPCS | Mod: 25

## 2021-10-13 PROCEDURE — G0463 HOSPITAL OUTPT CLINIC VISIT: HCPCS

## 2021-10-13 PROCEDURE — 99214 OFFICE O/P EST MOD 30 MIN: CPT | Performed by: INTERNAL MEDICINE

## 2021-10-13 PROCEDURE — 90662 IIV NO PRSV INCREASED AG IM: CPT

## 2021-10-13 PROCEDURE — G0008 ADMIN INFLUENZA VIRUS VAC: HCPCS

## 2021-10-13 RX ORDER — SILDENAFIL 100 MG/1
100 TABLET, FILM COATED ORAL DAILY PRN
Qty: 10 TABLET | Refills: 4 | Status: SHIPPED | OUTPATIENT
Start: 2021-10-13 | End: 2021-10-13

## 2021-10-13 RX ORDER — SILDENAFIL CITRATE 20 MG/1
TABLET ORAL
Qty: 30 TABLET | Refills: 5 | Status: SHIPPED | OUTPATIENT
Start: 2021-10-13 | End: 2022-03-16

## 2021-10-13 ASSESSMENT — ENCOUNTER SYMPTOMS
ALLERGIC/IMMUNOLOGIC NEGATIVE: 1
ENDOCRINE NEGATIVE: 1
EYES NEGATIVE: 1
CONSTITUTIONAL NEGATIVE: 1

## 2021-10-13 ASSESSMENT — PAIN SCALES - GENERAL: PAINLEVEL: NO PAIN (0)

## 2021-10-13 NOTE — NURSING NOTE
"Patient comes in for follow up on abdomin.  Jennifer Souza LPN ....................10/13/2021   1:20 PM  Chief Complaint   Patient presents with     Follow Up     abdomin       Initial /78 (BP Location: Right arm, Patient Position: Sitting, Cuff Size: Adult Regular)   Pulse 76   Temp 97  F (36.1  C) (Tympanic)   Resp 18   Wt 67.1 kg (148 lb)   SpO2 98%   BMI 22.02 kg/m   Estimated body mass index is 22.02 kg/m  as calculated from the following:    Height as of 1/29/21: 1.746 m (5' 8.75\").    Weight as of this encounter: 67.1 kg (148 lb).  Medication Reconciliation: complete    Jennifer Souza LPN    "

## 2021-10-13 NOTE — PROGRESS NOTES
Chief Complaint:  F/U on diverticulitis.    HPI: He is here for follow-up on diverticulitis.  He has had this in the past.  He had a CT scan which confirmed left lower quadrant diverticulitis.  He was treated with a full 2 weeks of Augmentin and is now back to normal and has no symptoms and no pain.  Lab at the time of his diagnosis was unremarkable other than a minimal elevation in CRP.  His last colonoscopy was 3 years ago so he is not due for that.    We talked about increasing fiber in his diet.  He is having some trouble getting his fiber covered through his insurance so we switched him to [employer] to see if that would be covered.    He is having trouble with erectile dysfunction he wonders what he can do for that.  He would like to get a flu shot.  He had questions about shingles vaccine as well as questions about his Covid booster shot.    Past Medical History:   Diagnosis Date     Abdominal aortic atherosclerosis (H)      Anxiety      Cyst of right kidney      Diverticulitis of intestine without perforation or abscess without bleeding     No Comments Provided     Gout of foot      History of tobacco abuse      Hyperlipidemia      Prostate cancer (H)      Tubular adenoma        Past Surgical History:   Procedure Laterality Date     ARTHROSCOPY SHOULDER RT/LT Left 1980s     BIOPSY PROSTATE TRANSRECTAL  10/2016     COLONOSCOPY N/A 4/6/2018    F/U Colonoscopy 2023; tubular adenoma       No Known Allergies    Current Outpatient Medications   Medication Sig Dispense Refill     amitriptyline (ELAVIL) 10 MG tablet Take 1 tablet (10 mg) by mouth At Bedtime 90 tablet 3     ASPIRIN ADULT LOW STRENGTH PO Take 81 mg by mouth daily       atorvastatin (LIPITOR) 20 MG tablet Take 1 tablet (20 mg) by mouth At Bedtime 90 tablet 3     busPIRone (BUSPAR) 15 MG tablet TAKE 1 TABLET (15 MG) BY MOUTH 2 TIMES DAILY AS NEEDED (FOR ANXIETY) 60 tablet 3     fish oil-omega-3 fatty acids 1000 MG capsule Take 2 g by mouth daily        fluticasone (FLONASE) 50 MCG/ACT nasal spray INSTILL 2 SPRAYS INTO EACH NOSTRIL ONCE DAILY FOR CHRONIC NASAL CONGESTION AND POLYP 16 mL 3     ibuprofen (ADVIL/MOTRIN) 800 MG tablet Take 1 tablet (800 mg) by mouth every 8 hours as needed for moderate pain (take scheduled x 3 days then as needed) 100 tablet 1     LORazepam (ATIVAN) 1 MG tablet Take 1 tablet (1 mg) by mouth daily as needed for anxiety 30 tablet 0     methylcellulose (CITRUCEL) powder Take 0.55 g (1.925 teaspoonful) by mouth daily 454 g 3     omeprazole (PRILOSEC) 20 MG DR capsule Take 1 capsule (20 mg) by mouth daily Dx CODE: K21.00 90 capsule 3     sildenafil (VIAGRA) 100 MG tablet Take 1 tablet (100 mg) by mouth daily as needed 10 tablet 4       Review of Systems   Constitutional: Negative.    Eyes: Negative.    Endocrine: Negative.    Allergic/Immunologic: Negative.        Physical Exam  Vitals and nursing note reviewed.   Constitutional:       General: He is not in acute distress.     Appearance: Normal appearance. He is not ill-appearing, toxic-appearing or diaphoretic.   Abdominal:      General: Abdomen is flat. Bowel sounds are normal. There is no distension.      Palpations: Abdomen is soft. There is no mass.      Tenderness: There is no abdominal tenderness. There is no guarding.   Neurological:      Mental Status: He is alert.         Assessment:        ICD-10-CM    1. Diverticulitis of large intestine with perforation without bleeding  K57.20 methylcellulose (CITRUCEL) powder   2. Erectile dysfunction, unspecified erectile dysfunction type  N52.9 sildenafil (VIAGRA) 100 MG tablet       Plan: In regards to his diverticulitis, he appears to have completely recovered.  I encouraged him to follow a healthy high-fiber diet as well as to continue with fiber supplementation on a daily basis.  He knows that this could, again at any time and he should be mindful of that and seek treatment sooner rather than later.    In regards to his erectile  dysfunction, he would like to try Viagra.  I put him on 100 mg tablets but asked him to start with 1/2 tablet to see if that is adequate.  Warned of side effects that would occur with this medication.    Flu shot given today and he will complete his other vaccination series as well.  Follow-up will be as needed.

## 2021-10-31 RX ORDER — IBUPROFEN 800 MG/1
TABLET, FILM COATED ORAL
Qty: 100 TABLET | Refills: 1 | Status: SHIPPED | OUTPATIENT
Start: 2021-10-31 | End: 2021-11-01

## 2021-11-01 RX ORDER — IBUPROFEN 800 MG/1
800 TABLET, FILM COATED ORAL EVERY 8 HOURS PRN
Qty: 100 TABLET | Refills: 1 | Status: SHIPPED | OUTPATIENT
Start: 2021-11-01 | End: 2023-06-15

## 2021-11-01 NOTE — TELEPHONE ENCOUNTER
10/31/21 1027 Sign Frank Billingsley MD Reorder from Order:[id number]     ibuprofen (ADVIL/MOTRIN) 800 MG tablet 100 tablet 1 10/31/2021  No   Sig: TAKE 1 TAB BY MOUTH EVERY 8 HRS AS NEEDED FOR MODERATE PAIN. TAKE SCHEDULE X 3 DAYS THEN AS NEEDED   Class: E-Prescribe   E-Prescribing Status: Transmission to pharmacy failed (10/31/2021 10:27 AM CDT)     [id number] - [place], [place] - [address]     PLEASE RESEND PRESCRIPTION.    Kina Daigle RN .............. 11/1/2021  8:53 AM

## 2021-12-20 ENCOUNTER — TELEPHONE (OUTPATIENT)
Dept: INTERNAL MEDICINE | Facility: OTHER | Age: 66
End: 2021-12-20
Payer: MEDICARE

## 2021-12-20 DIAGNOSIS — K57.20 DIVERTICULITIS OF LARGE INTESTINE WITH PERFORATION WITHOUT BLEEDING: Primary | ICD-10-CM

## 2021-12-20 NOTE — TELEPHONE ENCOUNTER
The patient called and is having problems again with diverticulitsi and is wondering if he can get medicine again.

## 2021-12-20 NOTE — TELEPHONE ENCOUNTER
Antibiotic prescription sent to his pharmacy.  He will need to be seen if he does not improve.       Patient notified   Bernarda Pierre LPN ..........12/20/2021 1:39 PM

## 2021-12-31 ENCOUNTER — TELEPHONE (OUTPATIENT)
Dept: INTERNAL MEDICINE | Facility: OTHER | Age: 66
End: 2021-12-31
Payer: MEDICARE

## 2021-12-31 NOTE — TELEPHONE ENCOUNTER
Patient is taking Amoxicillin and his symptoms have not gone away. He would like to know what he can do or does he have to be seen again. Please call    Marylou Alicea on 12/31/2021 at 12:11 PM

## 2022-01-10 ENCOUNTER — OFFICE VISIT (OUTPATIENT)
Dept: INTERNAL MEDICINE | Facility: OTHER | Age: 67
End: 2022-01-10
Attending: INTERNAL MEDICINE
Payer: MEDICARE

## 2022-01-10 VITALS
SYSTOLIC BLOOD PRESSURE: 120 MMHG | HEIGHT: 70 IN | DIASTOLIC BLOOD PRESSURE: 84 MMHG | OXYGEN SATURATION: 97 % | RESPIRATION RATE: 18 BRPM | BODY MASS INDEX: 21.76 KG/M2 | HEART RATE: 93 BPM | TEMPERATURE: 98.9 F | WEIGHT: 152 LBS

## 2022-01-10 DIAGNOSIS — C61 PROSTATE CANCER (H): ICD-10-CM

## 2022-01-10 DIAGNOSIS — K40.90 NON-RECURRENT UNILATERAL INGUINAL HERNIA WITHOUT OBSTRUCTION OR GANGRENE: Primary | ICD-10-CM

## 2022-01-10 DIAGNOSIS — E78.5 HYPERLIPIDEMIA, UNSPECIFIED HYPERLIPIDEMIA TYPE: ICD-10-CM

## 2022-01-10 PROCEDURE — 99214 OFFICE O/P EST MOD 30 MIN: CPT | Performed by: INTERNAL MEDICINE

## 2022-01-10 PROCEDURE — G0463 HOSPITAL OUTPT CLINIC VISIT: HCPCS

## 2022-01-10 ASSESSMENT — PATIENT HEALTH QUESTIONNAIRE - PHQ9
SUM OF ALL RESPONSES TO PHQ QUESTIONS 1-9: 0
SUM OF ALL RESPONSES TO PHQ QUESTIONS 1-9: 0
10. IF YOU CHECKED OFF ANY PROBLEMS, HOW DIFFICULT HAVE THESE PROBLEMS MADE IT FOR YOU TO DO YOUR WORK, TAKE CARE OF THINGS AT HOME, OR GET ALONG WITH OTHER PEOPLE: NOT DIFFICULT AT ALL

## 2022-01-10 ASSESSMENT — ANXIETY QUESTIONNAIRES
3. WORRYING TOO MUCH ABOUT DIFFERENT THINGS: NOT AT ALL
5. BEING SO RESTLESS THAT IT IS HARD TO SIT STILL: SEVERAL DAYS
GAD7 TOTAL SCORE: 2
GAD7 TOTAL SCORE: 2
7. FEELING AFRAID AS IF SOMETHING AWFUL MIGHT HAPPEN: NOT AT ALL
7. FEELING AFRAID AS IF SOMETHING AWFUL MIGHT HAPPEN: NOT AT ALL
1. FEELING NERVOUS, ANXIOUS, OR ON EDGE: NOT AT ALL
GAD7 TOTAL SCORE: 2
4. TROUBLE RELAXING: SEVERAL DAYS
6. BECOMING EASILY ANNOYED OR IRRITABLE: NOT AT ALL
2. NOT BEING ABLE TO STOP OR CONTROL WORRYING: NOT AT ALL

## 2022-01-10 ASSESSMENT — ENCOUNTER SYMPTOMS
ALLERGIC/IMMUNOLOGIC NEGATIVE: 1
CONSTITUTIONAL NEGATIVE: 1
EYES NEGATIVE: 1
ENDOCRINE NEGATIVE: 1

## 2022-01-10 ASSESSMENT — MIFFLIN-ST. JEOR: SCORE: 1472.78

## 2022-01-10 ASSESSMENT — PAIN SCALES - GENERAL: PAINLEVEL: NO PAIN (0)

## 2022-01-10 NOTE — PROGRESS NOTES
Chief Complaint:  F/U on abdominal pain.    HPI: He is in today for follow-up.  He called before Wendy thinking that he had diverticulitis.  He did not want to come in so I treated him.  He has documented diverticular disease on recent CT scanning with no other abnormalities.  Colonoscopy is up-to-date.  The patient was treated with the antibiotic and he really did not get any different.  He called after the antibiotics were done and was requesting another round of antibiotics when I instead told him that he needed to be seen.  He is here today for follow-up.    He describes the pain as a slight nagging pain in the left lower abdomen inguinal region.  He admits that it does seem to hurt more when he strains himself such as doing heavy snow shoveling.  No bowel change.  No urinary issues.    The patient has a history of hyperlipidemia.  He is on moderate intensity statin therapy.  He is due for recheck lipid in a month or so.    He also had questions about his PSA.  He has presumed low-grade prostate cancer on surveillance.  He had questions about that today and wonders if he should tell his girlfriend about this.    Past Medical History:   Diagnosis Date     Abdominal aortic atherosclerosis (H)      Anxiety      Cyst of right kidney      Diverticulitis of intestine without perforation or abscess without bleeding     No Comments Provided     Gout of foot      History of tobacco abuse      Hyperlipidemia      Prostate cancer (H)      Tubular adenoma        Past Surgical History:   Procedure Laterality Date     ARTHROSCOPY SHOULDER RT/LT Left 1980s     BIOPSY PROSTATE TRANSRECTAL  10/2016     COLONOSCOPY N/A 4/6/2018    F/U Colonoscopy 2023; tubular adenoma       No Known Allergies    Current Outpatient Medications   Medication Sig Dispense Refill     amitriptyline (ELAVIL) 10 MG tablet Take 1 tablet (10 mg) by mouth At Bedtime 90 tablet 3     ASPIRIN ADULT LOW STRENGTH PO Take 81 mg by mouth daily        atorvastatin (LIPITOR) 20 MG tablet Take 1 tablet (20 mg) by mouth At Bedtime 90 tablet 3     busPIRone (BUSPAR) 15 MG tablet TAKE 1 TABLET (15 MG) BY MOUTH 2 TIMES DAILY AS NEEDED (FOR ANXIETY) 60 tablet 3     fish oil-omega-3 fatty acids 1000 MG capsule Take 2 g by mouth daily       fluticasone (FLONASE) 50 MCG/ACT nasal spray INSTILL 2 SPRAYS INTO EACH NOSTRIL ONCE DAILY FOR CHRONIC NASAL CONGESTION AND POLYP 16 mL 3     ibuprofen (ADVIL/MOTRIN) 800 MG tablet Take 1 tablet (800 mg) by mouth every 8 hours as needed for moderate pain 100 tablet 1     LORazepam (ATIVAN) 1 MG tablet Take 1 tablet (1 mg) by mouth daily as needed for anxiety 30 tablet 0     methylcellulose (CITRUCEL) powder Take 0.55 g (1.925 teaspoonful) by mouth daily 454 g 3     omeprazole (PRILOSEC) 20 MG DR capsule Take 1 capsule (20 mg) by mouth daily Dx CODE: K21.00 90 capsule 3     sildenafil (REVATIO) 20 MG tablet 3-4 prior to sexual activity as needed 30 tablet 5       Review of Systems   Constitutional: Negative.    Eyes: Negative.    Endocrine: Negative.    Allergic/Immunologic: Negative.        Physical Exam  Vitals and nursing note reviewed.   Constitutional:       General: He is not in acute distress.     Appearance: Normal appearance. He is not ill-appearing, toxic-appearing or diaphoretic.   Abdominal:      General: Abdomen is flat.      Palpations: Abdomen is soft. There is no mass.       Neurological:      Mental Status: He is alert.         Assessment:        ICD-10-CM    1. Non-recurrent unilateral inguinal hernia without obstruction or gangrene  K40.90    2. Hyperlipidemia, unspecified hyperlipidemia type  E78.5 Lipid Panel   3. Prostate cancer on active surveillance since 2013  C61        Plan: I think he has a small hernia that is causing his discomfort, this is not from diverticulitis.  Patient was reassured.  His exam was otherwise unremarkable and as mentioned recent CT scanning showed no other abnormalities to be concerned  about.  Colon cancer screening is up-to-date.    In regards to his lipids, continue the statin and have his fasting lipid done in a month or so and I will send him a letter with the results.  Discussion regarding his elevated PSA and how he should approach that.  He will consider his options.  Follow-up will be as needed.    Answers for HPI/ROS submitted by the patient on 1/10/2022  If you checked off any problems, how difficult have these problems made it for you to do your work, take care of things at home, or get along with other people?: Not difficult at all  PHQ9 TOTAL SCORE: 0  MINI 7 TOTAL SCORE: 2  How many servings of fruits and vegetables do you eat daily?: 0-1  On average, how many sweetened beverages do you drink each day (Examples: soda, juice, sweet tea, etc.  Do NOT count diet or artificially sweetened beverages)?: 1  How many minutes a day do you exercise enough to make your heart beat faster?: 60 or more  How many days a week do you exercise enough to make your heart beat faster?: 7  How many days per week do you miss taking your medication?: 0

## 2022-01-10 NOTE — NURSING NOTE
Chief Complaint   Patient presents with     Recheck Medication     Med check     Medication Reconciliation: complete    FOOD SECURITY SCREENING QUESTIONS:    The next two questions are to help us understand your food security.  If you are feeling you need any assistance in this area, we have resources available to support you today.    Hunger Vital Signs:  Within the past 12 months we worried whether our food would run out before we got money to buy more. Never  Within the past 12 months the food we bought just didn't last and we didn't have money to get more. Never    Valeria Proctor CMA ([facility])

## 2022-01-11 ASSESSMENT — PATIENT HEALTH QUESTIONNAIRE - PHQ9: SUM OF ALL RESPONSES TO PHQ QUESTIONS 1-9: 0

## 2022-01-11 ASSESSMENT — ANXIETY QUESTIONNAIRES: GAD7 TOTAL SCORE: 2

## 2022-01-13 DIAGNOSIS — J30.1 ALLERGIC RHINITIS DUE TO POLLEN, UNSPECIFIED SEASONALITY: ICD-10-CM

## 2022-01-14 RX ORDER — FLUTICASONE PROPIONATE 50 MCG
SPRAY, SUSPENSION (ML) NASAL
Qty: 16 ML | Refills: 1 | Status: SHIPPED | OUTPATIENT
Start: 2022-01-14 | End: 2022-06-27

## 2022-01-14 NOTE — TELEPHONE ENCOUNTER
[facility] sent Rx request for the following:      FLUTICASONE PROP 50 MCG SPRAY  Sig: INSTILL 2 SPRAYS INTO EACH NOSTRIL ONCE DAILY FOR CHRONIC NASAL CONGESTION AND POLYP      Last Prescription Date:   6/8/2021  Last Fill Qty/Refills:         16ml, R-3    Last Office Visit:              1/10/2022   Future Office visit:           none    Prescription refilled per RN Medication Refill Policy.................... Paulo Lua RN ....................  1/14/2022   4:03 PM

## 2022-02-01 DIAGNOSIS — C61 PROSTATE CANCER (H): Primary | ICD-10-CM

## 2022-02-01 NOTE — PROGRESS NOTES
"Per last office visit  9/22/21 with Matt Dawson MD \"Plan  Follow up PSA q6 months.\"        Orders Placed    "

## 2022-02-12 DIAGNOSIS — E78.5 HYPERLIPIDEMIA, UNSPECIFIED HYPERLIPIDEMIA TYPE: ICD-10-CM

## 2022-02-14 RX ORDER — ATORVASTATIN CALCIUM 20 MG/1
TABLET, FILM COATED ORAL
Qty: 90 TABLET | Refills: 3 | Status: SHIPPED | OUTPATIENT
Start: 2022-02-14 | End: 2023-03-13

## 2022-02-14 NOTE — TELEPHONE ENCOUNTER
[id number] in [employer] [facility] [place] sent Rx request for the following:      Requested Prescriptions   Pending Prescriptions Disp Refills     atorvastatin (LIPITOR) 20 MG tablet [Pharmacy Med Name: ATORVASTATIN 20 MG TABLET] 90 tablet 3     Sig: TAKE 1 TABLET BY MOUTH AT BEDTIME       Statins Protocol Failed - 2/14/2022  8:52 AM        Failed - LDL on file in past 12 months     Recent Labs   Lab Test 01/29/21  1343   *           Last Prescription Date:   1/29/2021  Last Fill Qty/Refills:         90, R-3    Last Office Visit:             1/10/2022    Future Office visit:           None  Routing refill request to provider for review/approval because:    Does not meet Rn refill criteria. Unable to complete prescription refill per RN Medication Refill Policy. Keturah Mason RN on 2/14/2022 at 9:00 AM

## 2022-02-16 ENCOUNTER — LAB (OUTPATIENT)
Dept: LAB | Facility: OTHER | Age: 67
End: 2022-02-16
Attending: INTERNAL MEDICINE
Payer: MEDICARE

## 2022-02-16 DIAGNOSIS — C61 PROSTATE CANCER (H): ICD-10-CM

## 2022-02-16 DIAGNOSIS — E78.5 HYPERLIPIDEMIA, UNSPECIFIED HYPERLIPIDEMIA TYPE: ICD-10-CM

## 2022-02-16 LAB
CHOLEST SERPL-MCNC: 202 MG/DL
FASTING STATUS PATIENT QL REPORTED: ABNORMAL
HDLC SERPL-MCNC: 47 MG/DL (ref 23–92)
LDLC SERPL CALC-MCNC: 132 MG/DL
NONHDLC SERPL-MCNC: 155 MG/DL
PSA SERPL-MCNC: 7.2 UG/L (ref 0–4)
TRIGL SERPL-MCNC: 115 MG/DL

## 2022-02-16 PROCEDURE — 84153 ASSAY OF PSA TOTAL: CPT | Mod: ZL

## 2022-02-16 PROCEDURE — 80061 LIPID PANEL: CPT | Mod: ZL

## 2022-02-16 PROCEDURE — 36415 COLL VENOUS BLD VENIPUNCTURE: CPT | Mod: ZL

## 2022-02-27 DIAGNOSIS — K57.20 DIVERTICULITIS OF LARGE INTESTINE WITH PERFORATION WITHOUT BLEEDING: Primary | ICD-10-CM

## 2022-03-04 ENCOUNTER — TELEPHONE (OUTPATIENT)
Dept: INTERNAL MEDICINE | Facility: OTHER | Age: 67
End: 2022-03-04
Payer: MEDICARE

## 2022-03-04 DIAGNOSIS — Z87.891 HISTORY OF TOBACCO ABUSE: Primary | ICD-10-CM

## 2022-03-04 NOTE — TELEPHONE ENCOUNTER
Radiology scheduler is requesting orders for lung cancer screening.   Franca Chacon LPN .............3/4/2022     2:18 PM

## 2022-03-14 DIAGNOSIS — N52.9 ERECTILE DYSFUNCTION, UNSPECIFIED ERECTILE DYSFUNCTION TYPE: ICD-10-CM

## 2022-03-14 DIAGNOSIS — F41.9 ANXIETY: ICD-10-CM

## 2022-03-16 RX ORDER — BUSPIRONE HYDROCHLORIDE 15 MG/1
15 TABLET ORAL 2 TIMES DAILY PRN
Qty: 180 TABLET | Refills: 2 | Status: SHIPPED | OUTPATIENT
Start: 2022-03-16 | End: 2023-06-30

## 2022-03-16 RX ORDER — SILDENAFIL CITRATE 20 MG/1
TABLET ORAL
Qty: 30 TABLET | Refills: 5 | Status: SHIPPED | OUTPATIENT
Start: 2022-03-16 | End: 2022-07-05

## 2022-03-16 NOTE — TELEPHONE ENCOUNTER
Routing refill request to provider for review/approval because:    LOV: 1/10/2022    Leah Preciado RN on 3/16/2022 at 9:49 AM

## 2022-03-17 ENCOUNTER — TELEPHONE (OUTPATIENT)
Dept: INTERNAL MEDICINE | Facility: OTHER | Age: 67
End: 2022-03-17
Payer: MEDICARE

## 2022-03-22 ENCOUNTER — TELEPHONE (OUTPATIENT)
Dept: INTERNAL MEDICINE | Facility: OTHER | Age: 67
End: 2022-03-22
Payer: MEDICARE

## 2022-03-23 ENCOUNTER — OFFICE VISIT (OUTPATIENT)
Dept: UROLOGY | Facility: OTHER | Age: 67
End: 2022-03-23
Attending: UROLOGY
Payer: MEDICARE

## 2022-03-23 ENCOUNTER — OFFICE VISIT (OUTPATIENT)
Dept: INTERNAL MEDICINE | Facility: OTHER | Age: 67
End: 2022-03-23
Attending: INTERNAL MEDICINE
Payer: MEDICARE

## 2022-03-23 VITALS
OXYGEN SATURATION: 96 % | TEMPERATURE: 99.1 F | WEIGHT: 160.6 LBS | HEART RATE: 101 BPM | SYSTOLIC BLOOD PRESSURE: 122 MMHG | RESPIRATION RATE: 20 BRPM | BODY MASS INDEX: 23.38 KG/M2 | DIASTOLIC BLOOD PRESSURE: 76 MMHG

## 2022-03-23 VITALS
DIASTOLIC BLOOD PRESSURE: 80 MMHG | RESPIRATION RATE: 16 BRPM | OXYGEN SATURATION: 96 % | BODY MASS INDEX: 23.43 KG/M2 | WEIGHT: 161 LBS | SYSTOLIC BLOOD PRESSURE: 124 MMHG | HEART RATE: 107 BPM

## 2022-03-23 DIAGNOSIS — N52.9 ED (ERECTILE DYSFUNCTION) OF ORGANIC ORIGIN: ICD-10-CM

## 2022-03-23 DIAGNOSIS — C61 PROSTATE CANCER (H): Primary | ICD-10-CM

## 2022-03-23 DIAGNOSIS — C61 PROSTATE CANCER (H): ICD-10-CM

## 2022-03-23 DIAGNOSIS — Z87.891 PERSONAL HISTORY OF TOBACCO USE: Primary | ICD-10-CM

## 2022-03-23 PROCEDURE — G0463 HOSPITAL OUTPT CLINIC VISIT: HCPCS

## 2022-03-23 PROCEDURE — G0463 HOSPITAL OUTPT CLINIC VISIT: HCPCS | Mod: 25

## 2022-03-23 PROCEDURE — G0463 HOSPITAL OUTPT CLINIC VISIT: HCPCS | Mod: 25,27

## 2022-03-23 PROCEDURE — G0296 VISIT TO DETERM LDCT ELIG: HCPCS | Performed by: INTERNAL MEDICINE

## 2022-03-23 PROCEDURE — 99213 OFFICE O/P EST LOW 20 MIN: CPT | Performed by: INTERNAL MEDICINE

## 2022-03-23 PROCEDURE — 99213 OFFICE O/P EST LOW 20 MIN: CPT | Performed by: UROLOGY

## 2022-03-23 ASSESSMENT — PAIN SCALES - GENERAL
PAINLEVEL: NO PAIN (0)
PAINLEVEL: NO PAIN (0)

## 2022-03-23 ASSESSMENT — ENCOUNTER SYMPTOMS
EYES NEGATIVE: 1
CONSTITUTIONAL NEGATIVE: 1
ALLERGIC/IMMUNOLOGIC NEGATIVE: 1
ENDOCRINE NEGATIVE: 1

## 2022-03-23 NOTE — NURSING NOTE
"Chief Complaint   Patient presents with     Other     Discuss CT       Initial /76   Pulse 101   Temp 99.1  F (37.3  C) (Tympanic)   Resp 20   Wt 72.8 kg (160 lb 9.6 oz)   SpO2 96%   BMI 23.38 kg/m   Estimated body mass index is 23.38 kg/m  as calculated from the following:    Height as of 1/10/22: 1.765 m (5' 9.5\").    Weight as of this encounter: 72.8 kg (160 lb 9.6 oz).  FOOD SECURITY SCREENING QUESTIONS  Hunger Vital Signs:  Within the past 12 months we worried whether our food would run out before we got money to buy more. Never  Within the past 12 months the food we bought just didn't last and we didn't have money to get more. Never        Dionisio Azevedo, SARANYA    "

## 2022-03-23 NOTE — PATIENT INSTRUCTIONS
Lung Cancer Screening   Frequently Asked Questions  If you are at high-risk for lung cancer, getting screened with low-dose computed tomography (LDCT) every year can help save your life. This handout offers answers to some of the most common questions about lung cancer screening. If you have other questions, please call [phone] ([phone]).     What is it?  Lung cancer screening uses special X-ray technology to create an image of your lung tissue. The exam is quick and easy and takes less than 10 seconds. We don t give you any medicine or use any needles. You can eat before and after the exam. You don t need to change your clothes as long as the clothing on your chest doesn t contain metal. But, you do need to be able to hold your breath for at least 6 seconds during the exam.    What is the goal of lung cancer screening?  The goal of lung cancer screening is to save lives. Many times, lung cancer is not found until a person starts having physical symptoms. Lung cancer screening can help detect lung cancer in the earliest stages when it may be easier to treat.    Who should be screened for lung cancer?  We suggest lung cancer screening for anyone who is at high-risk for lung cancer. You are in the high-risk group if you:      are between the ages of 55 and 79, and    have smoked at least 1 pack of cigarettes a day for 20 or more years, and    still smoke or have quit within the past 15 years.    However, if you have a new cough or shortness of breath, you should talk to your doctor before being screened.    Why does it matter if I have symptoms?  Certain symptoms can be a sign that you have a condition in your lungs that should be checked and treated by your doctor. These symptoms include fever, chest pain, a new or changing cough, shortness of breath that you have never felt before, coughing up blood or unexplained weight loss. Having any of these symptoms can greatly affect the results of lung  cancer screening.       Should all smokers get an LDCT lung cancer screening exam?  It depends. Lung cancer screening is for a very specific group of men and women who have a history of heavy smoking over a long period of time (see  Who should be screened for lung cancer  above).  I am in the high-risk group, but have been diagnosed with cancer in the past. Is LDCT lung cancer screening right for me?  In some cases, you should not have LDCT lung screening, such as when your doctor is already following your cancer with CT scan studies. Your doctor will help you decide if LDCT lung screening is right for you.  Do I need to have a screening exam every year?  Yes. If you are in the high-risk group described earlier, you should get an LDCT lung cancer screening exam every year until you are 79, or are no longer willing or able to undergo screening and possible procedures to diagnose and treat lung cancer.  How effective is LDCT at preventing death from lung cancer?  Studies have shown that LDCT lung cancer screening can lower the risk of death from lung cancer by 20 percent in people who are at high-risk.  What are the risks?  There are some risks and limitations of LDCT lung cancer screening. We want to make sure you understand the risks and benefits, so please let us know if you have any questions. Your doctor may want to talk with you more about these risks.    Radiation exposure: As with any exam that uses radiation, there is a very small increased risk of cancer. The amount of radiation in LDCT is small--about the same amount a person would get from a mammogram. Your doctor orders the exam when he or she feels the potential benefits outweigh the risks.    False negatives: No test is perfect, including LDCT. It is possible that you may have a medical condition, including lung cancer, that is not found during your exam. This is called a false negative result.    False positives and more testing: LDCT very often finds  something in the lung that could be cancer, but in fact is not. This is called a false positive result. False positive tests often cause anxiety. To make sure these findings are not cancer, you may need to have more tests. These tests will be done only if you give us permission. Sometimes patients need a treatment that can have side effects, such as a biopsy. For more information on false positives, see  What can I expect from the results?     Findings not related to lung cancer: Your LDCT exam also takes pictures of areas of your body next to your lungs. In a very small number of cases, the CT scan will show an abnormal finding in one of these areas, such as your kidneys, adrenal glands, liver or thyroid. This finding may not be serious, but you may need more tests. Your doctor can help you decide what other tests you may need, if any.  What can I expect from the results?  About 1 out of 4 LDCT exams will find something that may need more tests. Most of the time, these findings are lung nodules. Lung nodules are very small collections of tissue in the lung. These nodules are very common, and the vast majority--more than 97 percent--are not cancer (benign). Most are normal lymph nodes or small areas of scarring from past infections.  But, if a small lung nodule is found to be cancer, the cancer can be cured more than 90 percent of the time. To know if the nodule is cancer, we may need to get more images before your next yearly screening exam. If the nodule has suspicious features (for example, it is large, has an odd shape or grows over time), we will refer you to a specialist for further testing.  Will my doctor also get the results?  Yes. Your doctor will get a copy of your results.

## 2022-03-23 NOTE — NURSING NOTE
Chief Complaint   Patient presents with     Follow Up     history of prostate cancer   Patient presents to the clinic today for a 6 month follow up for history of prostate cancer    Review of Systems:    Weight loss:    No     Recent fever/chills:  No   Night sweats:   No  Current skin rash:  No   Recent hair loss:  No  Heat intolerance:  No   Cold intolerance:  No  Chest pain:   No   Palpitations:   No  Shortness of breath:  No   Wheezing:   No  Constipation:    No   Diarrhea:   No   Nausea:   No   Vomiting:   No   Kidney/side pain:  No   Back pain:   No  Frequent headaches:  No   Dizziness:     No  Leg swelling:   No   Calf pain:    No        Medication Reconciliation: completed   Yulisa Alvarado LPN  3/23/2022 2:30 PM

## 2022-03-23 NOTE — PROGRESS NOTES
Lung Cancer Screening Shared Decision Making Visit     Josué Álvarez is eligible for lung cancer screening on the basis of the information provided in my signed lung cancer screening order.     I have discussed with patient the risks and benefits of screening for lung cancer with low-dose CT.     The risks include:  radiation exposure: one low dose chest CT has as much ionizing radiation as about 15 chest x-rays or 6 months of background radiation living in [place]    false positives: 96% of positive findings/nodules are NOT cancer, but some might still require additional diagnostic evaluation, including biopsy  over-diagnosis: some slow growing cancers that might never have been clinically significant will be detected and treated unnecessarily     The benefit of early detection of lung cancer is contingent upon adherence to annual screening or more frequent follow up if indicated.     Furthermore, reaping the benefits of screening requires Josué Álvarez to be willing and physically able to undergo diagnostic procedures, if indicated. Although no specific guide is available for determining severity of comorbidities, it is reasonable to withhold screening in patients who have greater mortality risk from other diseases.     We did discuss that the only way to prevent lung cancer is to not smoke. Smoking cessation counseling was not given.      I did not offer risk estimation using a calculator such as this one:    ShouldIScreen      Chief Complaint:  F/U on issues.    HPI: He comes in today for follow-up.  He is quite happy about the fact that his PSA has gone down.  It is now in the same range its been for the last 9 years or so, apparently.  He still has not told his girlfriend about this issue and is debating whether he should.    He is also here to talk about getting a CT scan for lung cancer screening.  Its been a little over a year since his last one.  He quit smoking 8 years ago but he has a heavy history  of smoking prior to that.  He does not have any pulmonary symptoms.    Medications are reconciled.    Past Medical History:   Diagnosis Date     Abdominal aortic atherosclerosis (H)      Anxiety      Cyst of right kidney      Diverticulitis of intestine without perforation or abscess without bleeding     No Comments Provided     Gout of foot      History of tobacco abuse      Hyperlipidemia      Prostate cancer (H)      Tubular adenoma        Past Surgical History:   Procedure Laterality Date     ARTHROSCOPY SHOULDER RT/LT Left 1980s     BIOPSY PROSTATE TRANSRECTAL  10/2016     COLONOSCOPY N/A 4/6/2018    F/U Colonoscopy 2023; tubular adenoma       No Known Allergies    Current Outpatient Medications   Medication Sig Dispense Refill     amitriptyline (ELAVIL) 10 MG tablet Take 1 tablet (10 mg) by mouth At Bedtime 90 tablet 3     ASPIRIN ADULT LOW STRENGTH PO Take 81 mg by mouth daily       atorvastatin (LIPITOR) 20 MG tablet TAKE 1 TABLET BY MOUTH AT BEDTIME 90 tablet 3     busPIRone (BUSPAR) 15 MG tablet TAKE 1 TABLET (15 MG) BY MOUTH 2 TIMES DAILY AS NEEDED (FOR ANXIETY) 180 tablet 2     fish oil-omega-3 fatty acids 1000 MG capsule Take 2 g by mouth daily       fluticasone (FLONASE) 50 MCG/ACT nasal spray INSTILL 2 SPRAYS INTO EACH NOSTRIL ONCE DAILY FOR CHRONIC NASAL CONGESTION AND POLYP 16 mL 1     ibuprofen (ADVIL/MOTRIN) 800 MG tablet Take 1 tablet (800 mg) by mouth every 8 hours as needed for moderate pain 100 tablet 1     KONSYL 60.3 % POWD TAKE 18 G (1 TABLESPOONFUL) BY MOUTH DAILY 450 g 3     LORazepam (ATIVAN) 1 MG tablet Take 1 tablet (1 mg) by mouth daily as needed for anxiety 30 tablet 0     methylcellulose (CITRUCEL) powder Take 0.55 g (1.925 teaspoonful) by mouth daily 454 g 3     omeprazole (PRILOSEC) 20 MG DR capsule Take 1 capsule (20 mg) by mouth daily Dx CODE: K21.00 90 capsule 3     sildenafil (REVATIO) 20 MG tablet 3-4 PRIOR TO SEXUAL ACTIVITY AS NEEDED 30 tablet 5       Review of Systems    Constitutional: Negative.    Eyes: Negative.    Endocrine: Negative.    Allergic/Immunologic: Negative.        Physical Exam  Vitals and nursing note reviewed.   Constitutional:       General: He is not in acute distress.     Appearance: Normal appearance. He is not ill-appearing, toxic-appearing or diaphoretic.   Neurological:      Mental Status: He is alert.         Assessment:        ICD-10-CM    1. Personal history of tobacco use  Z87.891 Prof Fee: Shared Decision Making Visit for Lung Cancer Screening     CT Chest Lung Cancer Scrn Low Dose wo   2. Prostate cancer on active surveillance since 2013  C61        Plan: In regards to lung cancer screening, CT scan is ordered.  I did discuss with him the risk of false positives, etc.  He was congratulated on his PSA, something that he is quite happy about.  I would recommend that he have a Medicare wellness visit sometime in the late summer or fall.

## 2022-03-23 NOTE — PROGRESS NOTES
Type of Visit  EST    Chief Complaint  Prostate cancer, low risk on active surveillance  ED    HPI  Mr. Álvarez is a 66 year old male who follows up with low risk prostate cancer on active surveillance since 2013 and ED.  Patient underwent a PSA earlier today and follows up to review the results.  The patient is following up a few months past scheduled appointment as he was spending time out of town.  He denies any new symptoms.  Specifically denies new urinary symptoms, gross hematuria, unintentional weight loss, bone or pelvic pain.    He also has a new problem of erectile dysfunction.  He was recently prescribed sildenafil 20 mg.  He takes 2 to 3 tablets with success.  Denies side effects.    PCa History  The patient was initially diagnosed in 2013 with biopsy due to a PSA of 9.2  The patient underwent repeat biopsy in 2014 which was negative as well as MRI guided prostate biopsy in 2016 which was also negative.  The patient's prior urologist was Dr. Hernandes of [facility] in the [facility].      Review of Systems  I personally reviewed the ROS with the patient.    Nursing Notes:   Yulisa Alvarado LPN  3/23/2022  2:31 PM  Signed  Chief Complaint   Patient presents with     Follow Up     history of prostate cancer   Patient presents to the clinic today for a 6 month follow up for history of prostate cancer    Review of Systems:    Weight loss:    No     Recent fever/chills:  No   Night sweats:   No  Current skin rash:  No   Recent hair loss:  No  Heat intolerance:  No   Cold intolerance:  No  Chest pain:   No   Palpitations:   No  Shortness of breath:  No   Wheezing:   No  Constipation:    No   Diarrhea:   No   Nausea:   No   Vomiting:   No   Kidney/side pain:  No   Back pain:   No  Frequent headaches:  No   Dizziness:     No  Leg swelling:   No   Calf pain:    No        Medication Reconciliation: completed   Yulisa Alvarado LPN  3/23/2022 2:30 PM       Physical Exam  Vitals:    03/23/22 1434   BP: 124/80   BP  Location: Right arm   Patient Position: Sitting   Cuff Size: Adult Regular   Pulse: 107   Resp: 16   SpO2: 96%   Weight: 73 kg (161 lb)   Constitutional: No acute distress.  Alert and cooperative   Head: NCAT  Eyes: Conjunctivae normal  Cardiovascular: Regular rate.  Pulmonary/Chest: Respirations are even and non-labored bilaterally, no audible wheezing  Abdominal: Soft. No distension, tenderness, masses or guarding.   Extremities: FLAQUITA x 4, Warm. No clubbing.  No cyanosis.    Skin: Pink, warm and dry.  No visible rashes noted.  Back:  No left CVA tenderness.  No right CVA tenderness.  Genitourinary:  Nonpalpable bladder    Labs  Results for ARMOND ÁLVAREZ (MRN [id number]) as of 3/23/2022 14:29   2/16/2022 09:14   PSA 7.20 (H)     Results for ARMOND ÁLVAREZ (MRN [id number]) as of 9/22/2021 14:52   9/22/2021 13:11   PSA 11.12 (H)     Results for ARMOND ÁLVAREZ (MRN [id number]) as of 1/11/2021 14:37   1/11/2021 12:50   PSA 10.561 (H)     Results for ARMOND ÁLVAREZ (MRN [id number]) as of 7/6/2020 13:20   5/23/2019 12:58 1/6/2020 11:06 7/6/2020 10:40   PSA 8.973 (H) 12.011 (H) 9.798 (H)     Past PSAs  11/2018 9.73  5/2018  14.6  11/2017 11.6  4/2017  7.4  8/2016  9.8  1/2016  10.7**  6/2015  8.73  5/2014  9.9  2/2014  10.3*  10/2013 9.2*    *prompted TRUS biopsy  **prompted MRI guided biopsy    Imaging  CT a/p   9/18/2019  I personally reviewed and interpreted the images and report.  IMPRESSION:    This is the third episode of acute diverticulitis within the past 2  years, today associated with localized free air in the mesentery. No  abscess formation is seen.    Assessment  Mr. Álvarez is a 66 year old male who follows up with low risk prostate cancer on active surveillance since 2013.  Also with ED today.  Discussed alternative options such as a penile constriction band to improve maintenance of erection.  PSA today was reassuring.  Continue active surveillance of low risk prostate cancer.    Plan  Continue sildenafil  20mg, 2-5 tablets 1 hour prior to activity  Follow up PSA q6 months.

## 2022-03-30 DIAGNOSIS — K21.00 GASTROESOPHAGEAL REFLUX DISEASE WITH ESOPHAGITIS: ICD-10-CM

## 2022-04-01 ENCOUNTER — HOSPITAL ENCOUNTER (OUTPATIENT)
Dept: CT IMAGING | Facility: OTHER | Age: 67
Discharge: HOME | End: 2022-04-01
Attending: INTERNAL MEDICINE | Admitting: INTERNAL MEDICINE
Payer: MEDICARE

## 2022-04-01 DIAGNOSIS — Z87.891 PERSONAL HISTORY OF TOBACCO USE: ICD-10-CM

## 2022-04-01 PROCEDURE — 71271 CT THORAX LUNG CANCER SCR C-: CPT

## 2022-06-27 ENCOUNTER — OFFICE VISIT (OUTPATIENT)
Dept: INTERNAL MEDICINE | Facility: OTHER | Age: 67
End: 2022-06-27
Attending: INTERNAL MEDICINE
Payer: MEDICARE

## 2022-06-27 VITALS
HEART RATE: 87 BPM | WEIGHT: 161.6 LBS | DIASTOLIC BLOOD PRESSURE: 80 MMHG | OXYGEN SATURATION: 95 % | RESPIRATION RATE: 18 BRPM | BODY MASS INDEX: 23.52 KG/M2 | TEMPERATURE: 97.9 F | SYSTOLIC BLOOD PRESSURE: 126 MMHG

## 2022-06-27 DIAGNOSIS — J30.1 ALLERGIC RHINITIS DUE TO POLLEN, UNSPECIFIED SEASONALITY: ICD-10-CM

## 2022-06-27 DIAGNOSIS — H65.02 NON-RECURRENT ACUTE SEROUS OTITIS MEDIA OF LEFT EAR: Primary | ICD-10-CM

## 2022-06-27 PROCEDURE — 99213 OFFICE O/P EST LOW 20 MIN: CPT | Performed by: INTERNAL MEDICINE

## 2022-06-27 PROCEDURE — G0463 HOSPITAL OUTPT CLINIC VISIT: HCPCS

## 2022-06-27 RX ORDER — AMOXICILLIN 875 MG
875 TABLET ORAL 2 TIMES DAILY
Qty: 20 TABLET | Refills: 0 | Status: SHIPPED | OUTPATIENT
Start: 2022-06-27 | End: 2022-07-07

## 2022-06-27 RX ORDER — FLUTICASONE PROPIONATE 50 MCG
1 SPRAY, SUSPENSION (ML) NASAL DAILY
Qty: 16 ML | Refills: 4 | Status: SHIPPED | OUTPATIENT
Start: 2022-06-27 | End: 2023-02-09

## 2022-06-27 ASSESSMENT — ENCOUNTER SYMPTOMS
EYES NEGATIVE: 1
ENDOCRINE NEGATIVE: 1
CONSTITUTIONAL NEGATIVE: 1
ALLERGIC/IMMUNOLOGIC NEGATIVE: 1

## 2022-06-27 ASSESSMENT — PAIN SCALES - GENERAL: PAINLEVEL: SEVERE PAIN (6)

## 2022-06-27 NOTE — PROGRESS NOTES
Chief Complaint:  Plugged ear.    HPI: He is in today complaining of a plugged left ear.  This has been bothering him for the last 2 weeks.  He admits that he recently had a respiratory illness with a fever.  He did not test himself for COVID.  The respiratory illness is long gone but his left ear remains plugged.    He also needed some medications refilled today.  He told me about how he had injured his back recently lifting a chair but that has actually improved.    Past Medical History:   Diagnosis Date     Abdominal aortic atherosclerosis (H)      Anxiety      Cyst of right kidney      Diverticulitis of intestine without perforation or abscess without bleeding     No Comments Provided     Gout of foot      History of tobacco abuse      Hyperlipidemia      Prostate cancer (H)      Tubular adenoma        Past Surgical History:   Procedure Laterality Date     ARTHROSCOPY SHOULDER RT/LT Left 1980s     BIOPSY PROSTATE TRANSRECTAL  10/2016     COLONOSCOPY N/A 4/6/2018    F/U Colonoscopy 2023; tubular adenoma       No Known Allergies    Current Outpatient Medications   Medication Sig Dispense Refill     amitriptyline (ELAVIL) 10 MG tablet Take 1 tablet (10 mg) by mouth At Bedtime 90 tablet 3     amoxicillin (AMOXIL) 875 MG tablet Take 1 tablet (875 mg) by mouth 2 times daily for 10 days 20 tablet 0     ASPIRIN ADULT LOW STRENGTH PO Take 81 mg by mouth daily       atorvastatin (LIPITOR) 20 MG tablet TAKE 1 TABLET BY MOUTH AT BEDTIME 90 tablet 3     busPIRone (BUSPAR) 15 MG tablet TAKE 1 TABLET (15 MG) BY MOUTH 2 TIMES DAILY AS NEEDED (FOR ANXIETY) 180 tablet 2     fish oil-omega-3 fatty acids 1000 MG capsule Take 2 g by mouth daily       fluticasone (FLONASE) 50 MCG/ACT nasal spray Spray 1 spray into both nostrils daily 16 mL 4     ibuprofen (ADVIL/MOTRIN) 800 MG tablet Take 1 tablet (800 mg) by mouth every 8 hours as needed for moderate pain 100 tablet 1     KONSYL 60.3 % POWD TAKE 18 G (1 TABLESPOONFUL) BY MOUTH DAILY  450 g 3     LORazepam (ATIVAN) 1 MG tablet Take 1 tablet (1 mg) by mouth daily as needed for anxiety 30 tablet 0     methylcellulose (CITRUCEL) powder Take 0.55 g (1.925 teaspoonful) by mouth daily 454 g 3     omeprazole (PRILOSEC) 20 MG DR capsule TAKE 1 CAPSULE (20 MG) BY MOUTH DAILY DX CODE: K21.00 30 capsule 23     sildenafil (REVATIO) 20 MG tablet 3-4 PRIOR TO SEXUAL ACTIVITY AS NEEDED 30 tablet 5       Review of Systems   Constitutional: Negative.    Eyes: Negative.    Endocrine: Negative.    Allergic/Immunologic: Negative.        Physical Exam  Vitals and nursing note reviewed.   Constitutional:       General: He is not in acute distress.     Appearance: Normal appearance. He is not ill-appearing, toxic-appearing or diaphoretic.   HENT:      Ears:      Comments: He has fluid in the left ear behind the TM.  No erythema.  Right ear was normal.  Neurological:      Mental Status: He is alert.         Assessment:        ICD-10-CM    1. Non-recurrent acute serous otitis media of left ear  H65.02 amoxicillin (AMOXIL) 875 MG tablet   2. Allergic rhinitis due to pollen, unspecified seasonality  J30.1 fluticasone (FLONASE) 50 MCG/ACT nasal spray       Plan: Amoxicillin and decongestant for the next 10 to 10 days.  I reassured him that this may take as long as 3 weeks to resolve.  If he does not improve consider sending him to ENT.  Refills were done as needed.

## 2022-06-27 NOTE — NURSING NOTE
"Chief Complaint   Patient presents with     Ear Problem     Left       Initial /80   Pulse 87   Temp 97.9  F (36.6  C) (Tympanic)   Resp 18   Wt 73.3 kg (161 lb 9.6 oz)   SpO2 95%   BMI 23.52 kg/m   Estimated body mass index is 23.52 kg/m  as calculated from the following:    Height as of 1/10/22: 1.765 m (5' 9.5\").    Weight as of this encounter: 73.3 kg (161 lb 9.6 oz).  Medication Reconciliation: complete    FOOD SECURITY SCREENING QUESTIONS  Hunger Vital Signs:  Within the past 12 months we worried whether our food would run out before we got money to buy more. Never  Within the past 12 months the food we bought just didn't last and we didn't have money to get more. Never  Anette Arteaga LPN 6/27/2022 1:59 PM        "

## 2022-06-30 DIAGNOSIS — C61 PROSTATE CANCER (H): Primary | ICD-10-CM

## 2022-06-30 NOTE — PROGRESS NOTES
"Per last office visit  3/23/22 with Matt Dawson MD \"  Plan  Continue sildenafil 20mg, 2-5 tablets 1 hour prior to activity  Follow up PSA q6 months.\"        Orders Placed    "

## 2022-07-02 DIAGNOSIS — N52.9 ERECTILE DYSFUNCTION, UNSPECIFIED ERECTILE DYSFUNCTION TYPE: ICD-10-CM

## 2022-07-05 DIAGNOSIS — N52.9 ERECTILE DYSFUNCTION, UNSPECIFIED ERECTILE DYSFUNCTION TYPE: ICD-10-CM

## 2022-07-05 RX ORDER — SILDENAFIL CITRATE 20 MG/1
TABLET ORAL
Qty: 30 TABLET | Refills: 5 | Status: SHIPPED | OUTPATIENT
Start: 2022-07-05 | End: 2022-07-07

## 2022-07-05 NOTE — TELEPHONE ENCOUNTER
"Requested Prescriptions   Pending Prescriptions Disp Refills     sildenafil (REVATIO) 20 MG tablet [Pharmacy Med Name: Sildenafil Citrate 20 MG Oral Tablet] 30 tablet 0     Sig: TAKE 3-4 TABLETS BY MOUTH BEFORE SEXUAL ACTIVITY AS NEEDED       Erectile Dysfuction Protocol Passed - 7/2/2022 11:57 AM        Passed - Absence of nitrates on medication list        Passed - Absence of Alpha Blockers on Med list        Passed - Recent (12 mo) or future (30 days) visit within the authorizing provider's specialty     Patient has had an office visit with the authorizing provider or a provider within the authorizing providers department within the previous 12 mos or has a future within next 30 days. See \"Patient Info\" tab in inbasket, or \"Choose Columns\" in Meds & Orders section of the refill encounter.              Passed - Medication is active on med list        Passed - Patient is age 18 or older     Last Written Prescription Date:  3/16/22  Last Fill Quantity: 30,   # refills: 5  Last Office Visit: 6/27/22 [facility]  Future Office visit:    Next 5 appointments (look out 90 days)    Sep 26, 2022  2:30 PM  Return Visit with Matt Dawson MD  [facility] ([facility] ) [address]  [place] [id number] [phone]         Routing refill request to provider for review/approval   Brenda J. Goodell, RN on 7/5/2022 at 1:16 PM      "

## 2022-07-07 ENCOUNTER — TELEPHONE (OUTPATIENT)
Dept: INTERNAL MEDICINE | Facility: OTHER | Age: 67
End: 2022-07-07

## 2022-07-07 ENCOUNTER — OFFICE VISIT (OUTPATIENT)
Dept: FAMILY MEDICINE | Facility: OTHER | Age: 67
End: 2022-07-07
Attending: PHYSICIAN ASSISTANT
Payer: MEDICARE

## 2022-07-07 VITALS
DIASTOLIC BLOOD PRESSURE: 86 MMHG | OXYGEN SATURATION: 98 % | HEART RATE: 66 BPM | RESPIRATION RATE: 16 BRPM | BODY MASS INDEX: 23.52 KG/M2 | WEIGHT: 161.6 LBS | TEMPERATURE: 97.8 F | SYSTOLIC BLOOD PRESSURE: 128 MMHG

## 2022-07-07 DIAGNOSIS — H69.92 DYSFUNCTION OF LEFT EUSTACHIAN TUBE: ICD-10-CM

## 2022-07-07 DIAGNOSIS — Z86.69 OTITIS MEDIA RESOLVED: ICD-10-CM

## 2022-07-07 DIAGNOSIS — H92.02 LEFT EAR PAIN: ICD-10-CM

## 2022-07-07 DIAGNOSIS — K57.20 DIVERTICULITIS OF LARGE INTESTINE WITH PERFORATION WITHOUT BLEEDING: Primary | ICD-10-CM

## 2022-07-07 PROCEDURE — G0463 HOSPITAL OUTPT CLINIC VISIT: HCPCS

## 2022-07-07 PROCEDURE — 99214 OFFICE O/P EST MOD 30 MIN: CPT | Performed by: PHYSICIAN ASSISTANT

## 2022-07-07 RX ORDER — PREDNISONE 20 MG/1
TABLET ORAL
Qty: 20 TABLET | Refills: 0 | Status: SHIPPED | OUTPATIENT
Start: 2022-07-07 | End: 2022-08-30

## 2022-07-07 RX ORDER — SILDENAFIL CITRATE 20 MG/1
TABLET ORAL
Qty: 30 TABLET | Refills: 0 | Status: SHIPPED | OUTPATIENT
Start: 2022-07-07 | End: 2022-09-26

## 2022-07-07 RX ORDER — TRIAMCINOLONE ACETONIDE 55 UG/1
2 SPRAY, METERED NASAL DAILY
Qty: 10.8 ML | Refills: 0 | Status: SHIPPED | OUTPATIENT
Start: 2022-07-07

## 2022-07-07 RX ORDER — KETOROLAC TROMETHAMINE 10 MG/1
10 TABLET, FILM COATED ORAL EVERY 6 HOURS PRN
Qty: 20 TABLET | Refills: 0 | Status: SHIPPED | OUTPATIENT
Start: 2022-07-07 | End: 2023-03-13

## 2022-07-07 ASSESSMENT — PAIN SCALES - GENERAL: PAINLEVEL: SEVERE PAIN (6)

## 2022-07-07 NOTE — TELEPHONE ENCOUNTER
Patient calling back states he took the last one of his the 10 day antibiotic but it has not done much to help, still feels clogged. Was not given any follow up info    Beatriz Wilkes on 7/7/2022 at 11:55 AM

## 2022-07-07 NOTE — TELEPHONE ENCOUNTER
Returned patient's call regarding clogged ear. Patient stated  ordered 875 mg of amoxicillin to be taken with Claritin. Patient states it has been 10 days and he took his last dose of antibiotic therapy today and has had little relief. Patient was offered to be transferred to the appointment line to make an appointment with a provider or been seen in the [facility]. Patient stated he would come to the [facility] later today.  Edilma Herrera LPN........................7/7/2022  1:27 PM

## 2022-07-07 NOTE — PATIENT INSTRUCTIONS
Start Nasacort - Nasal spray (no Flonase during this time period)     Please refer to your AVS for follow up and pain/symptoms management recommendations (I.e.: medications, helpful conservative treatment modalities, appropriate follow up if need to a specialist or family practice, etc.). Please return to urgent care if your symptoms change or worsen.     Discharge instructions:  -If you were prescribed a medication(s), please take this as prescribed/directed  -Monitor your symptoms, if changing/worsening, return to UC/ER or PCP for follow up    For pain control - we recommend alternating Tylenol and Ibuprofen if you are able to take these medications. Alternate every 4 hours as needed. I.e.: Ibuprofen at 8am, Tylenol 12pm, Ibuprofen 4pm    -Daily maximum of Tylenol is 4000mg (recommend staying under 3000mg)   -Daily maximum of Ibuprofen is 3200 mg  - Heat, ice, gentle stretching (among other remedies: biofreeze/icy hot, etc).     --If prescribed Toradol, you may alternate this with Tylenol every 4-6 hours, please do NOT take any additional antiinflammatories such as Naproxen/Aleve, Motrin/Ibuprofen as these are in the same antiinflammatory class as Toradol

## 2022-07-07 NOTE — NURSING NOTE
"Chief Complaint   Patient presents with     Ear Problem     Left       Initial /86   Pulse 66   Temp 97.8  F (36.6  C) (Tympanic)   Resp 16   Wt 73.3 kg (161 lb 9.6 oz)   SpO2 98%   BMI 23.52 kg/m   Estimated body mass index is 23.52 kg/m  as calculated from the following:    Height as of 1/10/22: 1.765 m (5' 9.5\").    Weight as of this encounter: 73.3 kg (161 lb 9.6 oz).  Medication Reconciliation: complete    FOOD SECURITY SCREENING QUESTIONS  Hunger Vital Signs:  Within the past 12 months we worried whether our food would run out before we got money to buy more. Never  Within the past 12 months the food we bought just didn't last and we didn't have money to get more. Never  Anette Arteaga LPN 7/7/2022 2:31 PM      "

## 2022-07-07 NOTE — PROGRESS NOTES
ASSESSMENT/PLAN:    I have reviewed the nursing notes.  I have reviewed the findings, diagnosis, plan and need for follow up with the patient.    1. Diverticulitis of large intestine with perforation without bleeding  - psyllium (METAMUCIL/KONSYL) 58.6 % powder; Take 18 g by mouth daily  Dispense: 425 g; Refill: 3  -At the end of our visit, patient expressed concern that he was unable to  his Konsyl at the pharmacy as the pharmacy had difficulty dispensing this medication.  He is on this medication very helpful in the past to limit his flares of diverticulitis and feels he may struggle to keep his symptoms under control if he goes any longer without the medication.  I sent through a reorder of medication under the instructions that they are able to dispense psyllium, Metamucil or Konsyl as long as in the correct concentration.     2. Dysfunction of left eustachian tube  - triamcinolone (NASACORT) 55 MCG/ACT nasal aerosol; Spray 2 sprays into both nostrils daily  Dispense: 10.8 mL; Refill: 0  - predniSONE (DELTASONE) 20 MG tablet; Take 3 tabs by mouth daily x 3 days, then 2 tabs daily x 3 days, then 1 tab daily x 3 days, then 1/2 tab daily x 3 days.  Dispense: 20 tablet; Refill: 0  - Adult ENT  Referral; Future  -I discussed the anatomy, pathophysiology and typical treatment plan of eustachian tube dysfunction.  I reviewed the up-to-date guidelines and tiered approach to treatment with patient.  We will do a trial of a prednisone taper and Nasacort (as he has had minimal relief with Flonase and Claritin at home alone).  We discussed the importance of taking the prednisone taper as prescribed and avoiding discontinuing the medication early unless he has severe adverse side effects.  We did discuss that Sudafed/pseudoephedrine are also options in which she could trial, however I did recommend that if he were to take these medications that he does not take them for more than 3 to 5 days due to  possibility of rebound symptoms, he ended up declining this treatment route which I think is perfectly acceptable.  I did discuss with patient that effusion/fluid may persist for up to 6 months.  I did place an ENT referral, if he finds his symptoms are rapidly worsening/progressive in nature he should schedule this visit.  He may continue with his Claritin in the interim.  We will hold the Flonase while he is on the Nasacort.    3. Left ear pain  - ketorolac (TORADOL) 10 MG tablet; Take 1 tablet (10 mg) by mouth every 6 hours as needed for moderate pain  Dispense: 20 tablet; Refill: 0  - Adult ENT  Referral; Future  -Patient requesting stronger medication due to ongoing ear pain.  I did discuss that narcotics are not indicated for this type of pain, we will do a short trial of Toradol.  He can take 110 mg tablet by mouth every 6-8 hours as needed.  He should avoid additional NSAIDs such as ibuprofen, Aleve/naproxen, etc. while on this medication.  He can alternate Toradol with Tylenol.  We reviewed the daily limits of Tylenol-4000 mg daily.  He may also try warm compresses to the site.  Avoid swimming in lakes, hot tubs and/or pools.  Avoid Q-tips.  Avoid barotrauma.    4. Otitis media resolved  -Discussed with patient that it is reassuring that his left otitis media for which she was recently treated with a 10-day course of amoxicillin 875 mg p.o. twice daily has resolved -no indications at this time for an additional antibiotic course.  Eustachian tube dysfunction remaining.    30 minutes spent face-to-face with patient reviewing up-to-date guidelines at length and proposed treatment plan.    Discussed warning signs/symptoms indicative of need to f/u    Follow up if symptoms persist or worsen or concerns    I explained my diagnostic considerations and recommendations to the patient, who voiced understanding and agreement with the treatment plan. All questions were answered. We discussed potential side  effects of any prescribed or recommended therapies, as well as expectations for response to treatments.    Magalis Fajardo PA-C  2022  2:33 PM    HPI:    Josué Álvarez is a 66 year old male  who [facility] for concerns of left ear pain x unsure (at least 3 weeks) duration.     Presence of the following:   No fevers or chills.   No sore throat/pharyngitis/tonsillitis.   No allergy/URI Symptoms  Yes Muffled Sounds/Change in Hearing  Yes Sensation of Fullness in Ear(s)  No Ringing in Ears/Tinnitus  No Balance Changes  No Dizziness  No Headache   No Ear Drainage  Denies persistent hearing loss, foul smelling odor from ear, changes in vision, nausea, vomiting, diarrhea, chest pain, shortness of breath.     No Recent swimming/hot tub  No submerging of head in shower/bathtub.     No Recent URI or other illness  History of otitis media: No  History of HEENT surgery (PE tubes, tonsillectomy/adenoidectomy, etc.): No  Recent Course of ABX: Yes: Amoxil 875 mg PO BID x 10 days, finished last dose today, mild relief.    Treatments Tried: Amoxil and Claritin  Prior History of Similar Symptoms: No    PCP - MD Analilia    Past Medical History:   Diagnosis Date     Abdominal aortic atherosclerosis (H)      Anxiety      Cyst of right kidney      Diverticulitis of intestine without perforation or abscess without bleeding     No Comments Provided     Gout of foot      History of tobacco abuse      Hyperlipidemia      Prostate cancer (H)      Tubular adenoma      Past Surgical History:   Procedure Laterality Date     ARTHROSCOPY SHOULDER RT/LT Left 1980s     BIOPSY PROSTATE TRANSRECTAL  10/2016     COLONOSCOPY N/A 2018    F/U Colonoscopy ; tubular adenoma     Social History     Tobacco Use     Smoking status: Former Smoker     Packs/day: 0.50     Years: 25.00     Pack years: 12.50     Types: Cigarettes     Quit date: 10/30/2014     Years since quittin.6     Smokeless tobacco: Former User     Quit date:  2018   Substance Use Topics     Alcohol use: Yes     Comment: 4 per night     Current Outpatient Medications   Medication Sig Dispense Refill     amitriptyline (ELAVIL) 10 MG tablet Take 1 tablet (10 mg) by mouth At Bedtime 90 tablet 3     amoxicillin (AMOXIL) 875 MG tablet Take 1 tablet (875 mg) by mouth 2 times daily for 10 days 20 tablet 0     ASPIRIN ADULT LOW STRENGTH PO Take 81 mg by mouth daily       atorvastatin (LIPITOR) 20 MG tablet TAKE 1 TABLET BY MOUTH AT BEDTIME 90 tablet 3     busPIRone (BUSPAR) 15 MG tablet TAKE 1 TABLET (15 MG) BY MOUTH 2 TIMES DAILY AS NEEDED (FOR ANXIETY) 180 tablet 2     fish oil-omega-3 fatty acids 1000 MG capsule Take 2 g by mouth daily       fluticasone (FLONASE) 50 MCG/ACT nasal spray Spray 1 spray into both nostrils daily 16 mL 4     ibuprofen (ADVIL/MOTRIN) 800 MG tablet Take 1 tablet (800 mg) by mouth every 8 hours as needed for moderate pain 100 tablet 1     KONSYL 60.3 % POWD TAKE 18 G (1 TABLESPOONFUL) BY MOUTH DAILY 450 g 3     LORazepam (ATIVAN) 1 MG tablet Take 1 tablet (1 mg) by mouth daily as needed for anxiety 30 tablet 0     methylcellulose (CITRUCEL) powder Take 0.55 g (1.925 teaspoonful) by mouth daily 454 g 3     omeprazole (PRILOSEC) 20 MG DR capsule TAKE 1 CAPSULE (20 MG) BY MOUTH DAILY DX CODE: K21.00 30 capsule 23     sildenafil (REVATIO) 20 MG tablet TAKE 3-4 TABLETS BY MOUTH BEFORE SEXUAL ACTIVITY AS NEEDED 30 tablet 0     No Known Allergies  Past medical history, past surgical history, current medications and allergies reviewed and accurate to the best of my knowledge.      ROS:  Refer to HPI    /86   Pulse 66   Temp 97.8  F (36.6  C) (Tympanic)   Resp 16   Wt 73.3 kg (161 lb 9.6 oz)   SpO2 98%   BMI 23.52 kg/m      EXAM:  General Appearance: Well appearing 66 year old male, appropriate appearance for age. No acute distress   Ears: Left TM intact, translucent with bony landmarks appreciated, no erythema, + mild dull effusion, no bulging, no  purulence.  Right TM intact, translucent with bony landmarks appreciated, no erythema, no effusion, no bulging, no purulence.  Left auditory canal clear.  Right auditory canal clear.  Normal external ears, non tender.  Eyes: conjunctivae normal without erythema or irritation, corneas clear, no drainage or crusting, no eyelid swelling, pupils equal   Oropharynx: moist mucous membranes, posterior pharynx without erythema, tonsils symmetric, no erythema, no exudates or petechiae, + mild post nasal drip seen, no trismus, voice clear.    Sinuses:  No sinus tenderness upon palpation of the frontal or maxillary sinuses  Nose:  Bilateral nares: no erythema, no edema, no drainage or congestion   Neck: supple without adenopathy  Respiratory: normal chest wall and respirations.  Normal effort.  Clear to auscultation bilaterally, no wheezing, crackles or rhonchi.  No increased work of breathing.  No cough appreciated.  Cardiac: RRR with no murmurs  Dermatological: no rashes noted of exposed skin  Psychological: normal affect, alert, oriented, and pleasant.     Labs:  None     Xray:  None

## 2022-08-16 ENCOUNTER — OFFICE VISIT (OUTPATIENT)
Dept: OTOLARYNGOLOGY | Facility: OTHER | Age: 67
End: 2022-08-16
Attending: OTOLARYNGOLOGY
Payer: MEDICARE

## 2022-08-16 DIAGNOSIS — H69.92 EUSTACHIAN TUBE DYSFUNCTION, LEFT: ICD-10-CM

## 2022-08-16 DIAGNOSIS — H90.3 SENSORY HEARING LOSS, BILATERAL: Primary | ICD-10-CM

## 2022-08-16 PROCEDURE — G0463 HOSPITAL OUTPT CLINIC VISIT: HCPCS

## 2022-08-16 NOTE — NURSING NOTE
Patient here to see ENT for hearing loss, left ear pain.  Bernarda Pierre LPN ..........8/16/2022 2:00 PM

## 2022-08-23 NOTE — PROGRESS NOTES
document embedded image  Patient Name: Josué Álvarez    Address: [address] [place] [address]     YOB: 1955    [place] [address]    MR Number: [id number]    Phone: [phone]  PCP: Magalis Fajardo PA-C            Appointment Date: 08/16/22   Visit Provider: Bunny Carrero MD    cc: Magalis Fajardo PA-C; ~    ENT Progress Note  Intake  Visit Reasons: Hearing loss, left ear pain    HPI  History of Present Illness  Chief complaint:  Sudden hearing loss left ear    History  The patient is a 66-year-old man who experienced pain and hearing loss in his left ear approximately 2 months ago.  He was seen in the emergency room several weeks ago and started on steroids.  He returns to the office today stating he has some mild fullness in the ear and wonders if there is some fluid.  He is never had a similar event in the past.     Exam  The auditory canals and TMs are clear bilaterally  Twenty-four responses are normal  The remainder of the head and neck exam is unremarkable  Audiogram he has a symmetric high-frequency sensorineural hearing loss.  His left tympanogram shows negative pressure peak    Allergies    No Known Allergies Allergy (Verified 08/17/22 09:03)    PFSH  PFSH:     Past Medical History: (Updated 08/17/22 @ 09:04 by Nathanael Troy, Med Assist)    Cancer  Ear pressure       A&P  Assessment & Plan  (1) Sensorineural hearing loss, bilateral:        Status: Acute        Code(s):  H90.3 - Sensorineural hearing loss, bilateral  (2) Dysfunction of left eustachian tube:        Status: Acute        Code(s):  H69.82 - Other specified disorders of Eustachian tube, left ear    Plan  Patient was reassured that there is no significant asymmetry of his hearing persisting.  He does have some mild eustachian tube dysfunction noted shoulder correct over time.      Bunny Carrero MD    08/16/22 0841    <Electronically signed by Bunny Carrero MD> 08/17/22 0949

## 2022-08-30 ENCOUNTER — OFFICE VISIT (OUTPATIENT)
Dept: INTERNAL MEDICINE | Facility: OTHER | Age: 67
End: 2022-08-30
Attending: INTERNAL MEDICINE
Payer: MEDICARE

## 2022-08-30 VITALS
OXYGEN SATURATION: 97 % | HEART RATE: 105 BPM | RESPIRATION RATE: 16 BRPM | SYSTOLIC BLOOD PRESSURE: 138 MMHG | TEMPERATURE: 97.4 F | BODY MASS INDEX: 23.03 KG/M2 | WEIGHT: 158.2 LBS | DIASTOLIC BLOOD PRESSURE: 84 MMHG

## 2022-08-30 DIAGNOSIS — M54.2 NECK PAIN: Primary | ICD-10-CM

## 2022-08-30 PROCEDURE — G0463 HOSPITAL OUTPT CLINIC VISIT: HCPCS

## 2022-08-30 PROCEDURE — 99213 OFFICE O/P EST LOW 20 MIN: CPT | Performed by: INTERNAL MEDICINE

## 2022-08-30 RX ORDER — GABAPENTIN 300 MG/1
300 CAPSULE ORAL 2 TIMES DAILY PRN
Qty: 180 CAPSULE | Refills: 1 | Status: SHIPPED | OUTPATIENT
Start: 2022-08-30 | End: 2023-01-12

## 2022-08-30 ASSESSMENT — ENCOUNTER SYMPTOMS
CARDIOVASCULAR NEGATIVE: 1
RESPIRATORY NEGATIVE: 1
CONSTITUTIONAL NEGATIVE: 1

## 2022-08-30 ASSESSMENT — PAIN SCALES - GENERAL: PAINLEVEL: SEVERE PAIN (7)

## 2022-08-30 NOTE — PROGRESS NOTES
ICD-10-CM    1. Neck pain  M54.2 gabapentin (NEURONTIN) 300 MG capsule       Elected to put him on gabapentin for his neck pain.  Warned of side effects.  He will follow-up depending on his clinical course.  He should have a Medicare wellness visit sometime towards the end of the year.    Tamara Moses is a 67 year old, presenting for the following health issues:  Neck Pain      HPI     Pain History:  When did you first notice your pain? - Chronic Pain   Have you seen this provider for your pain in the past?   Yes   Where in your body do you have pain? Neck  Are you seeing anyone else for your pain? No    PHQ-9 SCORE 7/15/2019 1/10/2022   PHQ-9 Total Score MyChart - 0   PHQ-9 Total Score 0 0       MINI-7 SCORE 9/28/2021 1/10/2022   Total Score - 2 (minimal anxiety)   Total Score 2 2           Current Outpatient Medications   Medication     ASPIRIN ADULT LOW STRENGTH PO     atorvastatin (LIPITOR) 20 MG tablet     busPIRone (BUSPAR) 15 MG tablet     fish oil-omega-3 fatty acids 1000 MG capsule     fluticasone (FLONASE) 50 MCG/ACT nasal spray     gabapentin (NEURONTIN) 300 MG capsule     ibuprofen (ADVIL/MOTRIN) 800 MG tablet     ketorolac (TORADOL) 10 MG tablet     KONSYL 60.3 % POWD     methylcellulose (CITRUCEL) powder     omeprazole (PRILOSEC) 20 MG DR capsule     psyllium (METAMUCIL/KONSYL) 58.6 % powder     sildenafil (REVATIO) 20 MG tablet     triamcinolone (NASACORT) 55 MCG/ACT nasal aerosol     No current facility-administered medications for this visit.           PDMP Review     None        Last CSA Agreement:   CSA -- Patient Level:    CSA: None found at the patient level.       Last UDS:             Review of Systems   Constitutional: Negative.    Respiratory: Negative.    Cardiovascular: Negative.             Objective    /84   Pulse 105   Temp 97.4  F (36.3  C) (Temporal)   Resp 16   Wt 71.8 kg (158 lb 3.2 oz)   SpO2 97%   BMI 23.03 kg/m    Body mass index is 23.03 kg/m .  Physical  Exam  Vitals and nursing note reviewed.   Constitutional:       General: He is not in acute distress.     Appearance: Normal appearance. He is not ill-appearing, toxic-appearing or diaphoretic.   Neurological:      Mental Status: He is alert.                            .  ..

## 2022-08-30 NOTE — PATIENT INSTRUCTIONS
He has osteoarthritis in his neck.  He has gone to physical therapy which helps short-term.  A friend of his gave him a single tablet of gabapentin which seemed to help substantially.  He is wondering if he can get a prescription for that to try.  He does not really want to go back to physical therapy.  We did briefly talk about considering an injection but I am not sure that he has localized pathology that would be amenable to an injection.  He was definitely better when he took prednisone for his ear problem.

## 2022-08-30 NOTE — NURSING NOTE
"Chief Complaint   Patient presents with     Neck Pain       Initial /84   Pulse 105   Temp 97.4  F (36.3  C) (Temporal)   Resp 16   Wt 71.8 kg (158 lb 3.2 oz)   SpO2 97%   BMI 23.03 kg/m   Estimated body mass index is 23.03 kg/m  as calculated from the following:    Height as of 1/10/22: 1.765 m (5' 9.5\").    Weight as of this encounter: 71.8 kg (158 lb 3.2 oz).  FOOD SECURITY SCREENING QUESTIONS  Hunger Vital Signs:  Within the past 12 months we worried whether our food would run out before we got money to buy more. Never  Within the past 12 months the food we bought just didn't last and we didn't have money to get more. Never        Dionisio Nicholson, LPN    "

## 2022-09-26 ENCOUNTER — LAB (OUTPATIENT)
Dept: LAB | Facility: OTHER | Age: 67
End: 2022-09-26
Attending: UROLOGY
Payer: MEDICARE

## 2022-09-26 ENCOUNTER — OFFICE VISIT (OUTPATIENT)
Dept: UROLOGY | Facility: OTHER | Age: 67
End: 2022-09-26
Attending: UROLOGY
Payer: MEDICARE

## 2022-09-26 VITALS
WEIGHT: 158 LBS | HEART RATE: 86 BPM | SYSTOLIC BLOOD PRESSURE: 116 MMHG | DIASTOLIC BLOOD PRESSURE: 80 MMHG | BODY MASS INDEX: 23 KG/M2

## 2022-09-26 DIAGNOSIS — C61 PROSTATE CANCER (H): Primary | ICD-10-CM

## 2022-09-26 DIAGNOSIS — C61 PROSTATE CANCER (H): ICD-10-CM

## 2022-09-26 DIAGNOSIS — N52.9 ERECTILE DYSFUNCTION, UNSPECIFIED ERECTILE DYSFUNCTION TYPE: ICD-10-CM

## 2022-09-26 LAB — PSA SERPL-MCNC: 9.91 UG/L (ref 0–4)

## 2022-09-26 PROCEDURE — G0463 HOSPITAL OUTPT CLINIC VISIT: HCPCS | Performed by: UROLOGY

## 2022-09-26 PROCEDURE — 84153 ASSAY OF PSA TOTAL: CPT | Mod: ZL

## 2022-09-26 PROCEDURE — 99214 OFFICE O/P EST MOD 30 MIN: CPT | Performed by: UROLOGY

## 2022-09-26 PROCEDURE — 36415 COLL VENOUS BLD VENIPUNCTURE: CPT | Mod: ZL

## 2022-09-26 RX ORDER — SILDENAFIL CITRATE 20 MG/1
TABLET ORAL
Qty: 30 TABLET | Refills: 11 | Status: SHIPPED | OUTPATIENT
Start: 2022-09-26 | End: 2023-10-30

## 2022-09-26 ASSESSMENT — PAIN SCALES - GENERAL: PAINLEVEL: NO PAIN (0)

## 2022-09-26 NOTE — PROGRESS NOTES
Type of Visit  EST    Chief Complaint  Prostate cancer, low risk on active surveillance  ED    HPI  Mr. Álvarez is a 67 year old male who follows up with low risk prostate cancer on active surveillance since 2013 and ED.  Patient underwent a PSA earlier today and follows up to review the results.  The patient is following up a few months past scheduled appointment as he was spending time out of town.  He denies any new symptoms.  Specifically denies new urinary symptoms, gross hematuria, unintentional weight loss, bone or pelvic pain.    He also has erectile dysfunction.  He was recently prescribed sildenafil 20 mg.  He takes 3 tablets with reliable success.  He would like to continue this regimen and is out of refills.  He denies side effects with the medication.    PCa History  The patient was initially diagnosed in 2013 with biopsy due to a PSA of 9.2  The patient underwent repeat biopsy in 2014 which was negative as well as MRI guided prostate biopsy in 2016 which was also negative.  The patient's prior urologist was Dr. Hernandes of [facility] in the [facility].      Review of Systems  I personally reviewed the ROS with the patient.    Nursing Notes:   Viviana Perdue LPN  9/26/2022  2:34 PM  Signed  Pt presents to clinic today for 6 month follow up history of prostate cancer     Review of Systems:    Weight loss:   No     Recent fever/chills:  No   Night sweats:   No  Current skin rash:  No   Recent hair loss:  No  Heat intolerance:  No   Cold intolerance:  No  Chest pain:   No   Palpitations:   No  Shortness of breath:  No   Wheezing:   No  Constipation:    No   Diarrhea:   No   Nausea:   No   Vomiting:   No   Kidney/side pain:  No   Back pain:   No  Frequent headaches:  No   Dizziness:     No  Leg swelling:   No   Calf pain:    No    Parents, brothers or sisters with history of kidney cancer?   No  Parents, brothers or sisters with historyof bladder cancer: No            Medication Reconciliation:  complete  Viviana Perdue, LPN,LPN on 9/26/2022 at 2:31 PM         Physical Exam  Vitals:    09/26/22 1429 09/26/22 1433   BP: (!) 150/82 116/80   Pulse: 86    Weight: 71.7 kg (158 lb)    Constitutional: No acute distress.  Alert and cooperative   Pulmonary/Chest: Respirations are even and non-labored bilaterally, no audible wheezing  Abdominal: Soft. No distension, tenderness, masses or guarding.   Extremities: FLAQUITA x 4, Warm. No clubbing.  No cyanosis.    Skin: Pink, warm and dry.  No visible rashes noted.  Back:  No left CVA tenderness.  No right CVA tenderness.  Genitourinary:  Nonpalpable bladder    Labs   09/26/22 12:33   PSA 9.91 (H)     Results for ARMOND ÁLVAREZ (MRN [id number]) as of 3/23/2022 14:29   2/16/2022 09:14   PSA 7.20 (H)     Results for ARMOND ÁLVAREZ (MRN [id number]) as of 9/22/2021 14:52   9/22/2021 13:11   PSA 11.12 (H)     Results for ARMOND ÁLVAREZ (MRN [id number]) as of 1/11/2021 14:37   1/11/2021 12:50   PSA 10.561 (H)     Results for ARMOND ÁLVAREZ (MRN [id number]) as of 7/6/2020 13:20   5/23/2019 12:58 1/6/2020 11:06 7/6/2020 10:40   PSA 8.973 (H) 12.011 (H) 9.798 (H)     Past PSAs  11/2018 9.73  5/2018  14.6  11/2017 11.6  4/2017  7.4  8/2016  9.8  1/2016  10.7**  6/2015  8.73  5/2014  9.9  2/2014  10.3*  10/2013 9.2*    *prompted TRUS biopsy  **prompted MRI guided biopsy    Imaging  CT a/p   9/18/2019  I personally reviewed and interpreted the images and report.  IMPRESSION:    This is the third episode of acute diverticulitis within the past 2  years, today associated with localized free air in the mesentery. No  abscess formation is seen.    Assessment  Mr. Álvarez is a 67 year old male who follows up with low risk prostate cancer on active surveillance since 2013 and ED.    Discussed alternative options such as a penile constriction band to improve maintenance of erection.    PSA today was reassuring.  Continue active surveillance of low risk prostate cancer.  We discussed additional  surveillance options such as a dedicated prostate MRI or a repeat biopsy.  The patient has been on active surveillance for 10 years at this point.  Initially he had numerous studies done including a dedicated MRI with at least 2 separate biopsies.  We discussed pursuing an additional prostate MRI for comparison.  He would like to defer another 6 months and discuss again at the next visit.    Plan  Continue sildenafil 20mg, 3-5 tablets 1 hour prior to activity  Follow up PSA q6 months  Discuss dedicated prostate MRI at the next visit

## 2022-09-26 NOTE — NURSING NOTE
Pt presents to clinic today for 6 month follow up history of prostate cancer     Review of Systems:    Weight loss:   No     Recent fever/chills:  No   Night sweats:   No  Current skin rash:  No   Recent hair loss:  No  Heat intolerance:  No   Cold intolerance:  No  Chest pain:   No   Palpitations:   No  Shortness of breath:  No   Wheezing:   No  Constipation:    No   Diarrhea:   No   Nausea:   No   Vomiting:   No   Kidney/side pain:  No   Back pain:   No  Frequent headaches:  No   Dizziness:     No  Leg swelling:   No   Calf pain:    No    Parents, brothers or sisters with history of kidney cancer?   No  Parents, brothers or sisters with historyof bladder cancer: No            Medication Reconciliation: complete  Viviana Perdue LPN, LPN on 9/26/2022 at 2:31 PM

## 2022-10-11 ENCOUNTER — TELEPHONE (OUTPATIENT)
Dept: INTERNAL MEDICINE | Facility: OTHER | Age: 67
End: 2022-10-11

## 2022-10-11 NOTE — TELEPHONE ENCOUNTER
The patient has something in his eye and is wondering if they can see him in the clinic, rapid clinic or has to go to the ER.  He will call [facility] too.  Please advise.

## 2022-10-11 NOTE — TELEPHONE ENCOUNTER
Called and spoke with patient who said that he had called [facility] who was able to see him today (10/11/2022) at 1300. Patient stated that he did not need to speak to me regarding the problem as he already got an appointment with [facility].    Elizabeth Whatley RN  ....................  10/11/2022   11:12 AM

## 2022-11-14 ENCOUNTER — IMMUNIZATION (OUTPATIENT)
Dept: FAMILY MEDICINE | Facility: OTHER | Age: 67
End: 2022-11-14
Attending: INTERNAL MEDICINE
Payer: MEDICARE

## 2022-11-14 DIAGNOSIS — Z23 NEED FOR PROPHYLACTIC VACCINATION AND INOCULATION AGAINST INFLUENZA: Primary | ICD-10-CM

## 2022-11-14 PROCEDURE — G0008 ADMIN INFLUENZA VIRUS VAC: HCPCS

## 2022-11-14 PROCEDURE — 0124A COVID-19,PF,PFIZER BOOSTER BIVALENT: CPT

## 2022-11-14 NOTE — PROGRESS NOTES
Immunization Documentation  Verified patient's first and last name, and . Stated reason for visit today is to receive COVID AND FLU vaccines. Accompained to clinic visit with self. Denied any concerns with previous immunizations. Allergies reviewed. VIS handout(s) reviewed and given to take home. Vaccines prepared and administered per standing order. Administration documented in IMMUNIZATIONS (see flowsheet and order for further information).  Instructed to wait in lobby for 15 minutes post-injection and notify staff immediately of any reaction.     Melissa Weldon RN ....................  2022   12:53 PM

## 2023-01-11 DIAGNOSIS — M54.2 NECK PAIN: ICD-10-CM

## 2023-01-11 NOTE — TELEPHONE ENCOUNTER
[facility] sent [facility] request for the following:    GABAPENTIN 300 MG CAPSULE  Last Prescription Date:   8/30/22  Last Fill Qty/Refills:         180, R-1    Last Office Visit:              8/3022   Future Office visit:           None   Magalis Tovar RN on 1/11/2023 at 4:29 PM     
Ambulatory

## 2023-01-12 RX ORDER — GABAPENTIN 300 MG/1
300 CAPSULE ORAL 2 TIMES DAILY PRN
Qty: 180 CAPSULE | Refills: 1 | Status: SHIPPED | OUTPATIENT
Start: 2023-01-12 | End: 2023-08-25

## 2023-01-24 DIAGNOSIS — C61 PROSTATE CANCER (H): Primary | ICD-10-CM

## 2023-01-24 NOTE — PROGRESS NOTES
"Per last office visit  9/26/22 with Matt Dawson MD \"Plan  Continue sildenafil 20mg, 3-5 tablets 1 hour prior to activity  Follow up PSA q6 months  Discuss dedicated prostate MRI at the next visit\"        Orders Placed    "

## 2023-02-07 DIAGNOSIS — J30.1 ALLERGIC RHINITIS DUE TO POLLEN, UNSPECIFIED SEASONALITY: ICD-10-CM

## 2023-02-09 RX ORDER — FLUTICASONE PROPIONATE 50 MCG
SPRAY, SUSPENSION (ML) NASAL
Qty: 16 ML | Refills: 4 | Status: SHIPPED | OUTPATIENT
Start: 2023-02-09 | End: 2024-02-19

## 2023-02-09 NOTE — TELEPHONE ENCOUNTER
[facility] sent [facility] request for the following:    FLUTICASONE PROP 50 MCG SPRAY  Last Prescription Date:   6/27/22  Last Fill Qty/Refills:         16, R-4    Last Office Visit:              8/30/22   Future Office visit:           None   In clinical absence of patient's primary, Frank Billingsley, patient is requesting that this message be sent to the covering provider for consideration please.  Magalis Tovar RN on 2/9/2023 at 2:52 PM

## 2023-02-17 ENCOUNTER — TELEPHONE (OUTPATIENT)
Dept: INTERNAL MEDICINE | Facility: OTHER | Age: 68
End: 2023-02-17
Payer: MEDICARE

## 2023-02-17 DIAGNOSIS — E78.5 HYPERLIPIDEMIA, UNSPECIFIED HYPERLIPIDEMIA TYPE: Primary | ICD-10-CM

## 2023-02-17 NOTE — TELEPHONE ENCOUNTER
Pt would like lab orders for upcoming Wellness visit.  Labs are scheduled on 3/10/23.      Mati Bhagat on 2/17/2023 at 12:53 PM

## 2023-03-10 ENCOUNTER — LAB (OUTPATIENT)
Dept: LAB | Facility: OTHER | Age: 68
End: 2023-03-10
Payer: MEDICARE

## 2023-03-10 DIAGNOSIS — E78.5 HYPERLIPIDEMIA, UNSPECIFIED HYPERLIPIDEMIA TYPE: ICD-10-CM

## 2023-03-10 LAB
ALBUMIN SERPL BCG-MCNC: 4.3 G/DL (ref 3.5–5.2)
ALP SERPL-CCNC: 58 U/L (ref 40–129)
ALT SERPL W P-5'-P-CCNC: 39 U/L (ref 10–50)
ANION GAP SERPL CALCULATED.3IONS-SCNC: 6 MMOL/L (ref 7–15)
AST SERPL W P-5'-P-CCNC: 27 U/L (ref 10–50)
BILIRUB SERPL-MCNC: 0.8 MG/DL
BUN SERPL-MCNC: 14.4 MG/DL (ref 8–23)
CALCIUM SERPL-MCNC: 9.5 MG/DL (ref 8.8–10.2)
CHLORIDE SERPL-SCNC: 104 MMOL/L (ref 98–107)
CHOLEST SERPL-MCNC: 159 MG/DL
CREAT SERPL-MCNC: 0.81 MG/DL (ref 0.67–1.17)
DEPRECATED HCO3 PLAS-SCNC: 29 MMOL/L (ref 22–29)
GFR SERPL CREATININE-BSD FRML MDRD: >90 ML/MIN/1.73M2
GLUCOSE SERPL-MCNC: 86 MG/DL (ref 70–99)
HDLC SERPL-MCNC: 44 MG/DL
LDLC SERPL CALC-MCNC: 87 MG/DL
NONHDLC SERPL-MCNC: 115 MG/DL
POTASSIUM SERPL-SCNC: 4.2 MMOL/L (ref 3.4–5.3)
PROT SERPL-MCNC: 6.9 G/DL (ref 6.4–8.3)
SODIUM SERPL-SCNC: 139 MMOL/L (ref 136–145)
TRIGL SERPL-MCNC: 142 MG/DL

## 2023-03-10 PROCEDURE — 36415 COLL VENOUS BLD VENIPUNCTURE: CPT | Mod: ZL

## 2023-03-10 PROCEDURE — 80061 LIPID PANEL: CPT | Mod: ZL

## 2023-03-10 PROCEDURE — 80053 COMPREHEN METABOLIC PANEL: CPT | Mod: ZL

## 2023-03-13 ENCOUNTER — OFFICE VISIT (OUTPATIENT)
Dept: INTERNAL MEDICINE | Facility: OTHER | Age: 68
End: 2023-03-13
Attending: INTERNAL MEDICINE
Payer: MEDICARE

## 2023-03-13 VITALS
SYSTOLIC BLOOD PRESSURE: 132 MMHG | DIASTOLIC BLOOD PRESSURE: 80 MMHG | HEIGHT: 69 IN | WEIGHT: 161 LBS | BODY MASS INDEX: 23.85 KG/M2 | RESPIRATION RATE: 18 BRPM | TEMPERATURE: 97 F | HEART RATE: 104 BPM | OXYGEN SATURATION: 98 %

## 2023-03-13 DIAGNOSIS — K21.00 GASTROESOPHAGEAL REFLUX DISEASE WITH ESOPHAGITIS, UNSPECIFIED WHETHER HEMORRHAGE: ICD-10-CM

## 2023-03-13 DIAGNOSIS — Z87.891 PERSONAL HISTORY OF TOBACCO USE: ICD-10-CM

## 2023-03-13 DIAGNOSIS — C61 PROSTATE CANCER (H): ICD-10-CM

## 2023-03-13 DIAGNOSIS — E78.5 HYPERLIPIDEMIA, UNSPECIFIED HYPERLIPIDEMIA TYPE: Primary | ICD-10-CM

## 2023-03-13 DIAGNOSIS — F41.9 ANXIETY: ICD-10-CM

## 2023-03-13 DIAGNOSIS — N52.9 ERECTILE DYSFUNCTION, UNSPECIFIED ERECTILE DYSFUNCTION TYPE: ICD-10-CM

## 2023-03-13 DIAGNOSIS — Z86.0100 HISTORY OF COLONIC POLYPS: ICD-10-CM

## 2023-03-13 PROCEDURE — G0439 PPPS, SUBSEQ VISIT: HCPCS | Performed by: INTERNAL MEDICINE

## 2023-03-13 PROCEDURE — 90677 PCV20 VACCINE IM: CPT

## 2023-03-13 PROCEDURE — G0296 VISIT TO DETERM LDCT ELIG: HCPCS | Performed by: INTERNAL MEDICINE

## 2023-03-13 PROCEDURE — G0463 HOSPITAL OUTPT CLINIC VISIT: HCPCS | Mod: 25

## 2023-03-13 PROCEDURE — 99212 OFFICE O/P EST SF 10 MIN: CPT | Mod: 25 | Performed by: INTERNAL MEDICINE

## 2023-03-13 RX ORDER — ATORVASTATIN CALCIUM 20 MG/1
20 TABLET, FILM COATED ORAL AT BEDTIME
Qty: 90 TABLET | Refills: 3 | Status: SHIPPED | OUTPATIENT
Start: 2023-03-13 | End: 2024-03-01

## 2023-03-13 ASSESSMENT — PAIN SCALES - GENERAL: PAINLEVEL: NO PAIN (0)

## 2023-03-13 NOTE — PROGRESS NOTES
SUBJECTIVE:   Josué is a 67 year old who presents for Preventive Visit.  Patient has been advised of split billing requirements and indicates understanding: Yes  Are you in the first 12 months of your Medicare coverage?  No    HPI     He is in today for a Medicare wellness visit.  In most respects he feels fine.  He has had a little trouble with gout in his right ankle but an occasional ibuprofen will help dramatically.  He does not feel that he needs anything more than that.  He has a history of smoking and is due for lung cancer screening.  He has a history of adenomatous colonic polyps and is due for colonoscopy.  He has treated hyperlipidemia with excellent control and tolerance of his statin.  He has reflux disease for which he takes omeprazole with good control.  He has a history of low risk prostate cancer and is being monitored.  He will need to establish care with a new urology provider.    Medications are reconciled.  Past medical history, past surgical history, family history and social histories are reviewed and updated.  Immunizations are reviewed.    Have you ever done Advance Care Planning? (For example, a Health Directive, POLST, or a discussion with a medical provider or your loved ones about your wishes): No, advance care planning information given to patient to review.  Patient plans to discuss their wishes with loved ones or provider.         Fall risk  Fallen 2 or more times in the past year?: No  Any fall with injury in the past year?: No    Cognitive Screening   1) Repeat 3 items (Leader, Season, Table)    2) Clock draw: NORMAL  3) 3 item recall: Recalls 3 objects  Results: 3 items recalled: COGNITIVE IMPAIRMENT LESS LIKELY    Mini-CogTM Copyright PAUL Patel. Licensed by the author for use in [facility]; reprinted with permission (jorge@.[facility]). All rights reserved.      Do you have sleep apnea, excessive snoring or daytime drowsiness?: no    Reviewed and updated as needed this  visit by clinical staff   Tobacco  Allergies  Meds  Problems  Med Hx  Surg Hx  Fam Hx          Reviewed and updated as needed this visit by Provider   Tobacco  Allergies  Meds  Problems  Med Hx  Surg Hx  Fam Hx         Social History     Tobacco Use     Smoking status: Former     Packs/day: 0.50     Years: 25.00     Pack years: 12.50     Types: Cigarettes     Quit date: 10/30/2014     Years since quittin.3     Smokeless tobacco: Former     Quit date:    Substance Use Topics     Alcohol use: Yes     Comment: occasional       No flowsheet data found.   Review current opioid prescriptions    For a patient with a current opioid prescription:    Reviewed potential Opioid Use Disorder (OUD) risk factors: No     Evaluate their pain severity and current treatment plan:     Provide information on non-opioid treatment options:    Refer to a specialist, as appropriate:    Get more information on pain management in the [facility] Pain Management Best Practices Inter-agency Task Force Report    Screen for potential Substance Use Disorders (SUDs)    Reviewed the patient s potential risk factors for SUDs: No     Refer to treatment or specialist, as appropriate:     A screening tool isn t required but you may use one:  Find more information in the National [place] on Drug Abuse Screening and Assessment Tools Chart    Current providers sharing in care for this patient include:   Patient Care Team:  Frank Billingsley MD as PCP - General (Internal Medicine)  Matt Dawson MD as Assigned Surgical Provider  Frank Billingsley MD as Assigned PCP    The following health maintenance items are reviewed in Epic and correct as of today:  Health Maintenance   Topic Date Due     ZOSTER IMMUNIZATION (2 of 3) 2018     Pneumococcal Vaccine: 65+ Years (3 - PPSV23 if available, else PCV20) 2021     LUNG CANCER SCREENING  2023     COLORECTAL CANCER SCREENING  2023     MEDICARE ANNUAL WELLNESS VISIT  2024      "FALL RISK ASSESSMENT  03/13/2024     DTAP/TDAP/TD IMMUNIZATION (2 - Td or Tdap) 12/01/2027     LIPID  03/10/2028     ADVANCE CARE PLANNING  03/13/2028     HEPATITIS C SCREENING  Completed     PHQ-2 (once per calendar year)  Completed     INFLUENZA VACCINE  Completed     AORTIC ANEURYSM SCREENING (SYSTEM ASSIGNED)  Completed     COVID-19 Vaccine  Completed     IPV IMMUNIZATION  Aged Out     MENINGITIS IMMUNIZATION  Aged Out     Labs reviewed in [facility]    Current Outpatient Medications   Medication     ASPIRIN ADULT LOW STRENGTH PO     atorvastatin (LIPITOR) 20 MG tablet     busPIRone (BUSPAR) 15 MG tablet     fish oil-omega-3 fatty acids 1000 MG capsule     fluticasone (FLONASE) 50 MCG/ACT nasal spray     gabapentin (NEURONTIN) 300 MG capsule     ibuprofen (ADVIL/MOTRIN) 800 MG tablet     omeprazole (PRILOSEC) 20 MG DR capsule     psyllium (METAMUCIL/KONSYL) 58.6 % powder     sildenafil (REVATIO) 20 MG tablet     triamcinolone (NASACORT) 55 MCG/ACT nasal aerosol     No current facility-administered medications for this visit.           Review of Systems  Constitutional, HEENT, cardiovascular, pulmonary, GI, , musculoskeletal, neuro, skin, endocrine and psych systems are negative, except as otherwise noted.    OBJECTIVE:   /80   Pulse 104   Temp 97  F (36.1  C) (Temporal)   Resp 18   Ht 1.753 m (5' 9\")   Wt 73 kg (161 lb)   SpO2 98%   BMI 23.78 kg/m   Estimated body mass index is 23.78 kg/m  as calculated from the following:    Height as of this encounter: 1.753 m (5' 9\").    Weight as of this encounter: 73 kg (161 lb).  Physical Exam  GENERAL: healthy, alert and no distress  EYES: Eyes grossly normal to inspection, PERRL and conjunctivae and sclerae normal  HENT: ear canals and TM's normal, nose and mouth without ulcers or lesions  NECK: no adenopathy, no asymmetry, masses, or scars and thyroid normal to palpation  RESP: lungs clear to auscultation - no rales, rhonchi or wheezes  CV: regular rate and " rhythm, normal S1 S2, no S3 or S4, no murmur, click or rub, no peripheral edema and peripheral pulses strong  ABDOMEN: soft, nontender, no hepatosplenomegaly, no masses and bowel sounds normal  MS: no gross musculoskeletal defects noted, no edema  SKIN: no suspicious lesions or rashes  NEURO: Normal strength and tone, mentation intact and speech normal  PSYCH: mentation appears normal, affect normal/bright        ASSESSMENT / PLAN:       ICD-10-CM    1. Hyperlipidemia, unspecified hyperlipidemia type  E78.5 atorvastatin (LIPITOR) 20 MG tablet      2. Personal history of tobacco use  Z87.891 Prof fee: Shared Decision Making for Lung Cancer Screening     CT Chest Lung Cancer Scrn Low Dose wo      3. Erectile dysfunction, unspecified erectile dysfunction type  N52.9       4. Prostate cancer (H)  C61 Adult Urology  Referral      5. Anxiety  F41.9       6. History of colonic polyps  Z86.010 Colonoscopy Screening  Referral      7. Gastroesophageal reflux disease with esophagitis, unspecified whether hemorrhage  K21.00 omeprazole (PRILOSEC) 20 MG DR capsule          Patient has been advised of split billing requirements and indicates understanding: Yes      COUNSELING:    Overall he appears to be stable.  Medications will continue without change.  Prevnar 20 given today.  Encouraged him to consider getting a Shingrix vaccine at some point in time.  He will need to establish care with a new urologist, consult request is placed.  He is due for colonoscopy, this has been ordered.  He is due for a follow-up CT scan of his lungs for lung cancer screening, this has been ordered and I will let him know the results.  Assuming all goes well and he has no other issues or concerns, follow-up annually.      Reviewed preventive health counseling, as reflected in patient instructions       Regular exercise       Healthy diet/nutrition        He reports that he quit smoking about 8 years ago. His smoking use included  cigarettes. He has a 12.50 pack-year smoking history. He quit smokeless tobacco use about 5 years ago.      Appropriate preventive services were discussed with this patient, including applicable screening as appropriate for cardiovascular disease, diabetes, osteopenia/osteoporosis, and glaucoma.  As appropriate for age/gender, discussed screening for colorectal cancer, prostate cancer, breast cancer, and cervical cancer. Checklist reviewing preventive services available has been given to the patient.    Reviewed patients plan of care and provided an AVS. The Basic Care Plan (routine screening as documented in Health Maintenance) for Josué meets the Care Plan requirement. This Care Plan has been established and reviewed with the Patient.      LIS SANTOS MD  [facility]    Identified Health Risks:

## 2023-03-13 NOTE — PROGRESS NOTES
Lung Cancer Screening Shared Decision Making Visit     Josué Álvarez, a 67 year old male, is eligible for lung cancer screening    History   Smoking Status     Former     Packs/day: 0.50     Years: 25.00     Types: Cigarettes     Quit date: 10/30/2014   Smokeless Tobacco     Former     Quit date: 2018       I have discussed with patient the risks and benefits of screening for lung cancer with low-dose CT.     The risks include:    radiation exposure: one low dose chest CT has as much ionizing radiation as about 15 chest x-rays, or 6 months of background radiation living in [place]      false positives: most findings/nodules are NOT cancer, but some might still require additional diagnostic evaluation, including biopsy    over-diagnosis: some slow growing cancers that might never have been clinically significant will be detected and treated unnecessarily     The benefit of early detection of lung cancer is contingent upon adherence to annual screening or more frequent follow up if indicated.     Furthermore, to benefit from screening, Josué must be willing and able to undergo diagnostic procedures, if indicated. Although no specific guide is available for determining severity of comorbidities, it is reasonable to withhold screening in patients who have greater mortality risk from other diseases.     We did discuss that the best way to prevent lung cancer is to not smoke.    Some patients may value a numeric estimation of lung cancer risk when evaluating if lung cancer screening is right for them, here is one calculator:    ShouldIScreen

## 2023-03-13 NOTE — PATIENT INSTRUCTIONS
Continue your current medications.    They will call you to schedule a colonoscopy.    They will call you to schedule an appointment with urology.    They will call you to schedule the CT scan of your lungs.    Prevnar 20 pneumonia shot given today.  At some point you need to get another shingles shot.

## 2023-03-14 DIAGNOSIS — Z12.11 ENCOUNTER FOR SCREENING COLONOSCOPY: Primary | ICD-10-CM

## 2023-03-14 NOTE — TELEPHONE ENCOUNTER
Screening Questions for the Scheduling of Screening Colonoscopies   (If Colonoscopy is diagnostic, Provider should review the chart before scheduling.)  Are you younger than 50 or older than 80?  NO  Do you take aspirin or fish oil?  ASPIRIN AND FISH OIL (if yes, tell patient to stop 1 week prior to Colonoscopy)  Do you take warfarin (Coumadin), clopidogrel (Plavix), apixaban (Eliquis), dabigatram (Pradaxa), rivaroxaban (Xarelto) or any blood thinner? NO  Do you use oxygen at home?  NO  Do you have kidney disease? NO  Are you on dialysis? NO  Have you had a stroke or heart attack in the last year? NO  Have you had a stent in your heart or any blood vessel in the last year? NO  Have you had a transplant of any organ? NO  Have you had a colonoscopy or upper endoscopy (EGD) before? YES         When?  2018  Date of scheduled Colonoscopy. 05/04/2023  Provider ANN  Pharmacy [facility]

## 2023-03-15 RX ORDER — POLYETHYLENE GLYCOL 3350, SODIUM CHLORIDE, SODIUM BICARBONATE, POTASSIUM CHLORIDE 420; 11.2; 5.72; 1.48 G/4L; G/4L; G/4L; G/4L
4000 POWDER, FOR SOLUTION ORAL ONCE
Qty: 4000 ML | Refills: 0 | Status: SHIPPED | OUTPATIENT
Start: 2023-03-15 | End: 2023-03-15

## 2023-03-15 RX ORDER — BISACODYL 5 MG
TABLET, DELAYED RELEASE (ENTERIC COATED) ORAL
Qty: 2 TABLET | Refills: 0 | Status: ON HOLD | OUTPATIENT
Start: 2023-03-15 | End: 2023-06-22

## 2023-04-03 ENCOUNTER — HOSPITAL ENCOUNTER (OUTPATIENT)
Dept: CT IMAGING | Facility: OTHER | Age: 68
Discharge: HOME | End: 2023-04-03
Attending: INTERNAL MEDICINE | Admitting: INTERNAL MEDICINE
Payer: MEDICARE

## 2023-04-03 DIAGNOSIS — Z87.891 PERSONAL HISTORY OF TOBACCO USE: ICD-10-CM

## 2023-04-03 PROCEDURE — 71271 CT THORAX LUNG CANCER SCR C-: CPT

## 2023-04-10 ENCOUNTER — TELEPHONE (OUTPATIENT)
Dept: INTERNAL MEDICINE | Facility: OTHER | Age: 68
End: 2023-04-10

## 2023-04-10 ENCOUNTER — LAB (OUTPATIENT)
Dept: LAB | Facility: OTHER | Age: 68
End: 2023-04-10
Attending: INTERNAL MEDICINE
Payer: MEDICARE

## 2023-04-10 DIAGNOSIS — C61 PROSTATE CANCER (H): ICD-10-CM

## 2023-04-10 LAB
HOLD SPECIMEN: NORMAL
PSA SERPL DL<=0.01 NG/ML-MCNC: 13.8 NG/ML (ref 0–4.5)

## 2023-04-10 PROCEDURE — 84153 ASSAY OF PSA TOTAL: CPT | Mod: ZL

## 2023-04-10 PROCEDURE — 36415 COLL VENOUS BLD VENIPUNCTURE: CPT | Mod: ZL

## 2023-04-10 NOTE — TELEPHONE ENCOUNTER
Dr. Billingsley - Patient requests you review his CT results as he is unsure if there is anything he should be concerned about. Please advise.     Writer will wait for today's PSA result and forward to Dr. Billingsley for review followed by return call to patient. Awilda Stanton RN on 4/10/2023 at 10:13 AM

## 2023-04-10 NOTE — TELEPHONE ENCOUNTER
The patient had a CT scan and got letters regarding this so would like to know what is happening.  Please advise.     Also has questions about his PSA which he is having labs done today at 12:30 for that.  Would like to talk about that too.

## 2023-04-11 NOTE — TELEPHONE ENCOUNTER
He needs to see a new urologist sooner.  He can establish care with a primary care provider in 6 months.

## 2023-04-11 NOTE — TELEPHONE ENCOUNTER
Patient advised. He will follow up with Min soon and consider establishing care with a new provider. Awilda Stanton RN on 4/11/2023 at 10:18 AM

## 2023-04-11 NOTE — TELEPHONE ENCOUNTER
Patient was hoping for greater detailed recommendations. He would like to know when he should follow-up next to check on the hardening/calcification of heart arteries. He would also like your take on how urgent Urology follow-up should be. Can he schedule his appointment to establish in 6 months or should he do so ASAP?  Awilda Stanton RN on 4/11/2023 at 9:46 AM

## 2023-06-07 ENCOUNTER — HOSPITAL ENCOUNTER (OUTPATIENT)
Dept: GENERAL RADIOLOGY | Facility: OTHER | Age: 68
Discharge: HOME | End: 2023-06-07
Attending: PHYSICIAN ASSISTANT
Payer: MEDICARE

## 2023-06-07 ENCOUNTER — OFFICE VISIT (OUTPATIENT)
Dept: FAMILY MEDICINE | Facility: OTHER | Age: 68
End: 2023-06-07
Attending: PHYSICIAN ASSISTANT
Payer: MEDICARE

## 2023-06-07 VITALS
HEART RATE: 67 BPM | OXYGEN SATURATION: 97 % | TEMPERATURE: 97.1 F | RESPIRATION RATE: 20 BRPM | SYSTOLIC BLOOD PRESSURE: 130 MMHG | DIASTOLIC BLOOD PRESSURE: 74 MMHG | BODY MASS INDEX: 24.03 KG/M2 | WEIGHT: 162.25 LBS | HEIGHT: 69 IN

## 2023-06-07 DIAGNOSIS — Z00.00 ENCOUNTER FOR MEDICAL EXAMINATION TO ESTABLISH CARE: ICD-10-CM

## 2023-06-07 DIAGNOSIS — E55.9 MILD VITAMIN D DEFICIENCY: ICD-10-CM

## 2023-06-07 DIAGNOSIS — R05.3 CHRONIC COUGH: Primary | ICD-10-CM

## 2023-06-07 PROCEDURE — G0463 HOSPITAL OUTPT CLINIC VISIT: HCPCS | Mod: 25

## 2023-06-07 PROCEDURE — 71046 X-RAY EXAM CHEST 2 VIEWS: CPT

## 2023-06-07 PROCEDURE — G0463 HOSPITAL OUTPT CLINIC VISIT: HCPCS

## 2023-06-07 PROCEDURE — 99214 OFFICE O/P EST MOD 30 MIN: CPT | Performed by: PHYSICIAN ASSISTANT

## 2023-06-07 RX ORDER — BISACODYL 5 MG
1 TABLET, DELAYED RELEASE (ENTERIC COATED) ORAL DAILY
Qty: 360 TABLET | Refills: 0 | Status: SHIPPED | OUTPATIENT
Start: 2023-06-07

## 2023-06-07 RX ORDER — POLYETHYLENE GLYCOL 3350, SODIUM CHLORIDE, SODIUM BICARBONATE, POTASSIUM CHLORIDE 420; 11.2; 5.72; 1.48 G/4L; G/4L; G/4L; G/4L
POWDER, FOR SOLUTION ORAL
Status: ON HOLD | COMMUNITY
Start: 2023-03-15 | End: 2023-06-22

## 2023-06-07 ASSESSMENT — PAIN SCALES - GENERAL: PAINLEVEL: NO PAIN (0)

## 2023-06-07 NOTE — H&P (VIEW-ONLY)
Assessment & Plan     1. Chronic cough  - XR Chest 2 Views  -Vital signs are stable, oxygen 97% on room air, blood pressure 130/74, afebrile with temperature of 97.1  F.  No coughing, wheezing, rhonchi, rales on examination, there are also no visible accessory muscle or retractions noted.  Chest x-ray was obtained due to chronic nature of cough, this returned negative for acute cardiopulmonary process.  We also reviewed his CT lung cancer screening from April of this year at length.  Cough differential this time includes GERD, postnasal drip, chronic cough of unknown etiology, emphysema, etc.  I would recommend to keep a eye on timing of the cough to see if the (any correlations (after/before eating, bedtime, allergy season, etc.).  We certainly could look at referral for pulmonary function testing and other options once we are able to fine-tune symptoms/treatment plan.  At this point in time I do not believe that a course of antibiotics or steroids be beneficial as patient is afebrile and there is no signs of pneumonia or bronchitis.  Strict return precautions reviewed. Patient is in agreement and understanding of the above treatment plan. All questions and concerns were addressed and answered to patient's satisfaction. AVS reviewed with patient.     2. Mild vitamin D deficiency  - Calcium Carbonate-Vit D-Min ( CALCIUM/VITAMIN D3) 600-800 MG-UNIT TABS; Take 1 tablet by mouth daily  Dispense: 360 tablet; Refill: 0  -Discussed that many individuals in [place] have mild vitamin D deficiency, recommend to take 600 to 800 IU daily to help combat this.  He may pick this up over-the-counter if not covered as a prescription.    3. Encounter for medical examination to establish care  -Establish care visit completed today, PCP updated in chart per patient request.    See Patient Instructions    31 minutes was spent face-to-face on patient care.    No follow-ups on file.    Magalis Fajardo PA-C  [facility] AND  "HERNANDEZ Moses is a 67 year old, presenting for the following health issues:  Follow Up (Ongoing dry cough, CT results from imaging 04/03/23)         View : No data to display.              MICHELLE Moses presents today to follow-up on CT lung cancer screening from 4/2023.  There was no branch tree-in-bud nodules within the right upper lobe, compatible with acute bronchopneumonia or other focal pneumonitis, recommended delay follow-up PA and lateral chest imaging.  There was mild centrilobular emphysema, this was stable compared to cross examination of CT lung cancer screening from 2022.  No cancerous findings.  He states that he has had a chronic cough for years that has lingered over the last few weeks to months.  Cough is dry in nature.  Afebrile, no postnasal drip, sore throat, nasal congestion, wheezing, rales, difficulty speaking.  He is able to run up to 5 miles like normal.  He has no shortness of breath or chest pain.  He has required no treatment for emphysema in the past.    His PCP also retired in April 2023, he presents today in part to establish care as well.    Review of Systems   Constitutional, HEENT, cardiovascular, pulmonary, GI, , musculoskeletal, neuro, skin, endocrine and psych systems are negative, except as otherwise noted.      Objective    /74 (BP Location: Right arm, Patient Position: Sitting, Cuff Size: Adult Regular)   Pulse 67   Temp 97.1  F (36.2  C) (Tympanic)   Resp 20   Ht 1.753 m (5' 9\")   Wt 73.6 kg (162 lb 4 oz)   SpO2 97%   BMI 23.96 kg/m    Body mass index is 23.96 kg/m .  Physical Exam   GENERAL: healthy, alert and no distress  EYES: Eyes grossly normal to inspection, PERRL and conjunctivae and sclerae normal  HENT: ear canals and TM's normal, nose and mouth without ulcers or lesions  NECK: no adenopathy, no asymmetry, masses, or scars and thyroid normal to palpation  RESP: lungs clear to auscultation - no rales, rhonchi or wheezes  CV: regular " rate and rhythm, normal S1 S2, no S3 or S4, no murmur, click or rub, no peripheral edema and peripheral pulses strong  SKIN: no suspicious lesions or rashes  PSYCH: mentation appears normal, affect normal/bright  LYMPH: normal ant/post cervical, supraclavicular nodes    Results for orders placed or performed in visit on 06/07/23   XR Chest 2 Views     Status: None    Narrative    Exam:  XR CHEST 2 VIEWS    HISTORY: chronic cough; Chronic cough.    COMPARISON:  4/3/2023.    FINDINGS:     The cardiomediastinal contours are normal.      No focal consolidation, effusion, or pneumothorax.      No acute osseous abnormality.       Impression    IMPRESSION:      No acute cardiopulmonary process.      CRESCENCIO BAH MD         SYSTEM ID:  [id number]

## 2023-06-07 NOTE — PROGRESS NOTES
Assessment & Plan     1. Chronic cough  - XR Chest 2 Views  -Vital signs are stable, oxygen 97% on room air, blood pressure 130/74, afebrile with temperature of 97.1  F.  No coughing, wheezing, rhonchi, rales on examination, there are also no visible accessory muscle or retractions noted.  Chest x-ray was obtained due to chronic nature of cough, this returned negative for acute cardiopulmonary process.  We also reviewed his CT lung cancer screening from April of this year at length.  Cough differential this time includes GERD, postnasal drip, chronic cough of unknown etiology, emphysema, etc.  I would recommend to keep a eye on timing of the cough to see if the (any correlations (after/before eating, bedtime, allergy season, etc.).  We certainly could look at referral for pulmonary function testing and other options once we are able to fine-tune symptoms/treatment plan.  At this point in time I do not believe that a course of antibiotics or steroids be beneficial as patient is afebrile and there is no signs of pneumonia or bronchitis.  Strict return precautions reviewed. Patient is in agreement and understanding of the above treatment plan. All questions and concerns were addressed and answered to patient's satisfaction. AVS reviewed with patient.     2. Mild vitamin D deficiency  - Calcium Carbonate-Vit D-Min ( CALCIUM/VITAMIN D3) 600-800 MG-UNIT TABS; Take 1 tablet by mouth daily  Dispense: 360 tablet; Refill: 0  -Discussed that many individuals in [place] have mild vitamin D deficiency, recommend to take 600 to 800 IU daily to help combat this.  He may pick this up over-the-counter if not covered as a prescription.    3. Encounter for medical examination to establish care  -Establish care visit completed today, PCP updated in chart per patient request.    See Patient Instructions    31 minutes was spent face-to-face on patient care.    No follow-ups on file.    Magalis Fajardo PA-C  [facility] AND  "HERNANDEZ Moses is a 67 year old, presenting for the following health issues:  Follow Up (Ongoing dry cough, CT results from imaging 04/03/23)         View : No data to display.              MICHELLE Moses presents today to follow-up on CT lung cancer screening from 4/2023.  There was no branch tree-in-bud nodules within the right upper lobe, compatible with acute bronchopneumonia or other focal pneumonitis, recommended delay follow-up PA and lateral chest imaging.  There was mild centrilobular emphysema, this was stable compared to cross examination of CT lung cancer screening from 2022.  No cancerous findings.  He states that he has had a chronic cough for years that has lingered over the last few weeks to months.  Cough is dry in nature.  Afebrile, no postnasal drip, sore throat, nasal congestion, wheezing, rales, difficulty speaking.  He is able to run up to 5 miles like normal.  He has no shortness of breath or chest pain.  He has required no treatment for emphysema in the past.    His PCP also retired in April 2023, he presents today in part to establish care as well.    Review of Systems   Constitutional, HEENT, cardiovascular, pulmonary, GI, , musculoskeletal, neuro, skin, endocrine and psych systems are negative, except as otherwise noted.      Objective    /74 (BP Location: Right arm, Patient Position: Sitting, Cuff Size: Adult Regular)   Pulse 67   Temp 97.1  F (36.2  C) (Tympanic)   Resp 20   Ht 1.753 m (5' 9\")   Wt 73.6 kg (162 lb 4 oz)   SpO2 97%   BMI 23.96 kg/m    Body mass index is 23.96 kg/m .  Physical Exam   GENERAL: healthy, alert and no distress  EYES: Eyes grossly normal to inspection, PERRL and conjunctivae and sclerae normal  HENT: ear canals and TM's normal, nose and mouth without ulcers or lesions  NECK: no adenopathy, no asymmetry, masses, or scars and thyroid normal to palpation  RESP: lungs clear to auscultation - no rales, rhonchi or wheezes  CV: regular " rate and rhythm, normal S1 S2, no S3 or S4, no murmur, click or rub, no peripheral edema and peripheral pulses strong  SKIN: no suspicious lesions or rashes  PSYCH: mentation appears normal, affect normal/bright  LYMPH: normal ant/post cervical, supraclavicular nodes    Results for orders placed or performed in visit on 06/07/23   XR Chest 2 Views     Status: None    Narrative    Exam:  XR CHEST 2 VIEWS    HISTORY: chronic cough; Chronic cough.    COMPARISON:  4/3/2023.    FINDINGS:     The cardiomediastinal contours are normal.      No focal consolidation, effusion, or pneumothorax.      No acute osseous abnormality.       Impression    IMPRESSION:      No acute cardiopulmonary process.      CRESCENCIO BAH MD         SYSTEM ID:  [id number]

## 2023-06-07 NOTE — NURSING NOTE
Patient presents to clinic for follow up with ongoing dry cough, CT results from imaging 04/03/2.    Medication Rec Complete  Kina Ambrose LPN............6/7/2023 1:13 PM

## 2023-06-21 ENCOUNTER — ANESTHESIA EVENT (OUTPATIENT)
Dept: SURGERY | Facility: OTHER | Age: 68
End: 2023-06-21
Payer: MEDICARE

## 2023-06-22 ENCOUNTER — HOSPITAL ENCOUNTER (OUTPATIENT)
Facility: OTHER | Age: 68
Discharge: HOME | End: 2023-06-22
Attending: SURGERY | Admitting: SURGERY
Payer: MEDICARE

## 2023-06-22 ENCOUNTER — ANESTHESIA (OUTPATIENT)
Dept: SURGERY | Facility: OTHER | Age: 68
End: 2023-06-22
Payer: MEDICARE

## 2023-06-22 VITALS
WEIGHT: 162 LBS | DIASTOLIC BLOOD PRESSURE: 89 MMHG | HEIGHT: 69 IN | SYSTOLIC BLOOD PRESSURE: 107 MMHG | OXYGEN SATURATION: 97 % | TEMPERATURE: 96.6 F | HEART RATE: 59 BPM | RESPIRATION RATE: 18 BRPM | BODY MASS INDEX: 23.99 KG/M2

## 2023-06-22 DIAGNOSIS — K21.00 GASTROESOPHAGEAL REFLUX DISEASE WITH ESOPHAGITIS, UNSPECIFIED WHETHER HEMORRHAGE: ICD-10-CM

## 2023-06-22 PROCEDURE — 250N000011 HC RX IP 250 OP 636

## 2023-06-22 PROCEDURE — 258N000003 HC RX IP 258 OP 636: Performed by: SURGERY

## 2023-06-22 PROCEDURE — 250N000009 HC RX 250

## 2023-06-22 PROCEDURE — 45385 COLONOSCOPY W/LESION REMOVAL: CPT | Mod: PT | Performed by: SURGERY

## 2023-06-22 PROCEDURE — 88305 TISSUE EXAM BY PATHOLOGIST: CPT

## 2023-06-22 PROCEDURE — 45380 COLONOSCOPY AND BIOPSY: CPT | Performed by: SURGERY

## 2023-06-22 PROCEDURE — 45385 COLONOSCOPY W/LESION REMOVAL: CPT | Performed by: NURSE ANESTHETIST, CERTIFIED REGISTERED

## 2023-06-22 RX ORDER — NALOXONE HYDROCHLORIDE 0.4 MG/ML
0.4 INJECTION, SOLUTION INTRAMUSCULAR; INTRAVENOUS; SUBCUTANEOUS
Status: DISCONTINUED | OUTPATIENT
Start: 2023-06-22 | End: 2023-06-22 | Stop reason: HOSPADM

## 2023-06-22 RX ORDER — LIDOCAINE 40 MG/G
CREAM TOPICAL
Status: DISCONTINUED | OUTPATIENT
Start: 2023-06-22 | End: 2023-06-22 | Stop reason: HOSPADM

## 2023-06-22 RX ORDER — NALOXONE HYDROCHLORIDE 0.4 MG/ML
0.2 INJECTION, SOLUTION INTRAMUSCULAR; INTRAVENOUS; SUBCUTANEOUS
Status: DISCONTINUED | OUTPATIENT
Start: 2023-06-22 | End: 2023-06-22 | Stop reason: HOSPADM

## 2023-06-22 RX ORDER — FLUMAZENIL 0.1 MG/ML
0.2 INJECTION, SOLUTION INTRAVENOUS
Status: DISCONTINUED | OUTPATIENT
Start: 2023-06-22 | End: 2023-06-22 | Stop reason: HOSPADM

## 2023-06-22 RX ORDER — PROPOFOL 10 MG/ML
INJECTION, EMULSION INTRAVENOUS PRN
Status: DISCONTINUED | OUTPATIENT
Start: 2023-06-22 | End: 2023-06-22

## 2023-06-22 RX ORDER — SODIUM CHLORIDE, SODIUM LACTATE, POTASSIUM CHLORIDE, CALCIUM CHLORIDE 600; 310; 30; 20 MG/100ML; MG/100ML; MG/100ML; MG/100ML
INJECTION, SOLUTION INTRAVENOUS CONTINUOUS
Status: DISCONTINUED | OUTPATIENT
Start: 2023-06-22 | End: 2023-06-22 | Stop reason: HOSPADM

## 2023-06-22 RX ORDER — LIDOCAINE HYDROCHLORIDE 20 MG/ML
INJECTION, SOLUTION INFILTRATION; PERINEURAL PRN
Status: DISCONTINUED | OUTPATIENT
Start: 2023-06-22 | End: 2023-06-22

## 2023-06-22 RX ORDER — PROPOFOL 10 MG/ML
INJECTION, EMULSION INTRAVENOUS CONTINUOUS PRN
Status: DISCONTINUED | OUTPATIENT
Start: 2023-06-22 | End: 2023-06-22

## 2023-06-22 RX ADMIN — PROPOFOL 120 MCG/KG/MIN: 10 INJECTION, EMULSION INTRAVENOUS at 08:13

## 2023-06-22 RX ADMIN — SODIUM CHLORIDE, SODIUM LACTATE, POTASSIUM CHLORIDE, AND CALCIUM CHLORIDE: 600; 310; 30; 20 INJECTION, SOLUTION INTRAVENOUS at 08:11

## 2023-06-22 RX ADMIN — LIDOCAINE HYDROCHLORIDE 100 MG: 20 INJECTION, SOLUTION INFILTRATION; PERINEURAL at 08:13

## 2023-06-22 RX ADMIN — PROPOFOL 100 MG: 10 INJECTION, EMULSION INTRAVENOUS at 08:13

## 2023-06-22 ASSESSMENT — ACTIVITIES OF DAILY LIVING (ADL)
ADLS_ACUITY_SCORE: 35
ADLS_ACUITY_SCORE: 35

## 2023-06-22 NOTE — OP NOTE
PROCEDURE NOTE    SURGEON:Morgan Perdue MD    PRE-OP DIAGNOSIS:  Screening Colonoscopy      POST-OP DIAGNOSIS: Diverticulosis and polyps    PROCEDURE:  Colonoscopy with cold snare    SPECIMEN:      ID Type Source Tests Collected by Time Destination   1 : TRANSVERSE COLON POLYP Polyp Large Intestine, Colon, Transverse SURGICAL PATHOLOGY EXAM Morgan Perdue MD 6/22/2023  8:22 AM    2 : sigmoid colon polyps Polyp Large Intestine, Colon, Sigmoid SURGICAL PATHOLOGY EXAM Morgan Perdue MD 6/22/2023  8:31 AM        ANESTHESIA:  MAC CRNA Independent: Estelita Pastrana APRN CRNA  SRNA: Vidhya Perdue   Coverage requested    ESTIMATED BLOOD LOSS: none    COMPLICATIONS:  None    INDICATION FOR THE PROCEDURE: The patient is a 67 year old male. The patient presents with history of polyps and diverticulosis/diverticulitis. I explained to the patient the risks, benefits and alternatives to screening colonoscopy for evaluating for cancer or polyps. We discussed the risks including bleeding, perforation, potential inability to reach the cecum and the risks of sedation. The patient's questions were answered and the patient wished to proceed. Informed consent paperwork was completed.    PROCEDURE: The patient was taken to the endoscopy suite. Appropriate monitors were attached. The patient was placed in the left lateral decubitus position. Timeout was performed confirming the patient's identity and procedure to be performed.  After appropriate sedation was confirmed, digital rectal exam was performed.  There was normal tone and no gross abnormality was noted.  The lubricated colonoscope was introduced into the anus the colon was insufflated with air. The prep quality was adequate. Under direct visualization the scope was advanced to the cecum. The mucosa of the colon was inspected while withdrawing the scope.  In the transverse colon a flat hypervascular polyp was removed with cold snare.  This measured 7 mm.  In the sigmoid colon  4 polyps measuring 3 to 6 mm were removed with cold snare.  There was diverticulosis. The scope was retroflexed in the rectum and the anorectal junction was inspected. No abnormalities were noted. The scope was returned to a neutral position and the colon was decompressed. The scope was removed. The patient tolerated the procedure with no immediately apparent complication. The patient was taken to recovery in stable condition.    FOLLOW UP: RECOMMEND high fiber diet, will call with pathology results.       Morgan Perdue MD on 6/22/2023 at 8:39 AM

## 2023-06-22 NOTE — ANESTHESIA PREPROCEDURE EVALUATION
Anesthesia Pre-Procedure Evaluation    Patient: Josué Álvarez   MRN: [id number] : 1955        Procedure : Procedure(s):  Colonoscopy          Past Medical History:   Diagnosis Date     Abdominal aortic atherosclerosis (H)      Anxiety      Cyst of right kidney      Diverticulitis of intestine without perforation or abscess without bleeding     No Comments Provided     Gout of foot      History of tobacco abuse      Hyperlipidemia      Prostate cancer (H)      Tubular adenoma       Past Surgical History:   Procedure Laterality Date     ARTHROSCOPY SHOULDER RT/LT Left 1980s     BIOPSY PROSTATE TRANSRECTAL  10/2016     COLONOSCOPY N/A 2018    F/U Colonoscopy ; tubular adenoma      No Known Allergies   Social History     Tobacco Use     Smoking status: Former     Packs/day: 0.50     Years: 25.00     Pack years: 12.50     Types: Cigarettes     Quit date: 10/30/2014     Years since quittin.6     Smokeless tobacco: Former     Quit date:    Substance Use Topics     Alcohol use: Yes     Comment: occasional      Wt Readings from Last 1 Encounters:   23 73.5 kg (162 lb)        Anesthesia Evaluation   Pt has had prior anesthetic. Type: MAC.    No history of anesthetic complications       ROS/MED HX  ENT/Pulmonary:  - neg pulmonary ROS     Neurologic:  - neg neurologic ROS     Cardiovascular:    (-) dyslipidemia   METS/Exercise Tolerance: >4 METS    Hematologic:  - neg hematologic  ROS     Musculoskeletal:   (+) arthritis,     GI/Hepatic:     (+) GERD,     Renal/Genitourinary:     (+) renal disease,     Endo:  - neg endo ROS     Psychiatric/Substance Use:  - neg psychiatric ROS     Infectious Disease:  - neg infectious disease ROS     Malignancy:  - neg malignancy ROS     Other:  - neg other ROS          Physical Exam    Airway        Mallampati: III   TM distance: > 3 FB   Neck ROM: full   Mouth opening: > 3 cm    Respiratory Devices and Support         Dental       (+) Minor Abnormalities - some  fillings, tiny chips      Cardiovascular   cardiovascular exam normal          Pulmonary   pulmonary exam normal                OUTSIDE LABS:  CBC:   Lab Results   Component Value Date    WBC 9.2 09/28/2021    WBC 7.6 09/20/2018    HGB 14.3 09/28/2021    HGB 11.6 (L) 09/20/2018    HCT 40.1 09/28/2021    HCT 34.0 (L) 09/20/2018     09/28/2021     09/20/2018     BMP:   Lab Results   Component Value Date     03/10/2023     09/28/2021    POTASSIUM 4.2 03/10/2023    POTASSIUM 4.2 09/28/2021    CHLORIDE 104 03/10/2023    CHLORIDE 101 09/28/2021    CO2 29 03/10/2023    CO2 29 09/28/2021    BUN 14.4 03/10/2023    BUN 18 09/28/2021    CR 0.81 03/10/2023    CR 0.81 09/28/2021    GLC 86 03/10/2023    GLC 88 09/28/2021     COAGS: No results found for: PTT, INR, FIBR  POC: No results found for: BGM, HCG, HCGS  HEPATIC:   Lab Results   Component Value Date    ALBUMIN 4.3 03/10/2023    PROTTOTAL 6.9 03/10/2023    ALT 39 03/10/2023    AST 27 03/10/2023    ALKPHOS 58 03/10/2023    BILITOTAL 0.8 03/10/2023     OTHER:   Lab Results   Component Value Date    LACT 1.0 09/18/2018    KAREN 9.5 03/10/2023    MAG 2.3 09/20/2018    LIPASE 7.6 (L) 08/14/2017    AMYLASE 23 (L) 08/14/2017    CRP 20.9 (H) 09/28/2021    SED 16 09/28/2021       Anesthesia Plan    ASA Status:  2   NPO Status:  NPO Appropriate    Anesthesia Type: MAC.   Induction: Propofol.           Consents    Anesthesia Plan(s) and associated risks, benefits, and realistic alternatives discussed. Questions answered and patient/representative(s) expressed understanding.     - Discussed: Risks, Benefits and Alternatives for BOTH SEDATION and the PROCEDURE were discussed     - Discussed with:  Patient      - Extended Intubation/Ventilatory Support Discussed: No.      - Patient is DNR/DNI Status: No    Use of blood products discussed: No .     Postoperative Care            Comments:                Vidhya Perdue

## 2023-06-22 NOTE — ANESTHESIA CARE TRANSFER NOTE
Patient: Josué Álvarez    Procedure: Procedure(s):  COLONOSCOPY, WITH POLYPECTOMY AND BIOPSY       Diagnosis: History of colonic polyps [Z86.010]  Diagnosis Additional Information: No value filed.    Anesthesia Type:   MAC     Note:    Oropharynx: oropharynx clear of all foreign objects and spontaneously breathing  Level of Consciousness: awake  Oxygen Supplementation: nasal cannula  Level of Supplemental Oxygen (L/min / FiO2): 2  Independent Airway: airway patency satisfactory and stable  Dentition: dentition unchanged  Vital Signs Stable: post-procedure vital signs reviewed and stable  Report to RN Given: handoff report given  Patient transferred to: Phase II    Handoff Report: Identifed the Patient, Identified the Reponsible Provider, Reviewed the pertinent medical history, Discussed the surgical course, Reviewed Intra-OP anesthesia mangement and issues during anesthesia, Set expectations for post-procedure period and Allowed opportunity for questions and acknowledgement of understanding      Vitals:  Vitals Value Taken Time   /79 06/22/23 0845   Temp 95.5  F (35.3  C) 06/22/23 0846   Pulse 62 06/22/23 0845   Resp     SpO2 97 % 06/22/23 0847   Vitals shown include unvalidated device data.    Electronically Signed By: Vidhya Perdue  June 22, 2023  8:47 AM

## 2023-06-22 NOTE — OR NURSING
Pt tolerated food and fluids without nausea. Steady on feet, IV removed. AVS reviewed in detail with pt. Dr Perdue was also in to talk with patient.

## 2023-06-22 NOTE — DISCHARGE INSTRUCTIONS
[facility]  Adult Discharge Orders & Instructions    ________________________________________________________________          For 12 hours after surgery  Get plenty of rest.  A responsible adult must stay with you for at least 12 hours after you leave the hospital.   You may feel lightheaded.  IF so, sit for a few minutes before standing.  Have someone help you get up.   You may have a slight fever. Call the doctor if your fever is over 101 F (38.3 C) (taken under the tongue) or lasts longer than 24 hours.  You may have a dry mouth, a sore throat, muscle aches or trouble sleeping.  These should go away after 24 hours.  Do not make important or legal decisions.  6.   Do not drive or use heavy equipment.  If you have weakness or tingling, don't drive or use heavy equipment until this feeling goes away.    To contact a doctor, call   [phone]_______________________

## 2023-06-22 NOTE — ANESTHESIA POSTPROCEDURE EVALUATION
Patient: Josué Álvarez    Procedure: Procedure(s):  COLONOSCOPY, WITH POLYPECTOMY AND BIOPSY       Anesthesia Type:  MAC    Note:  Disposition: Outpatient   Postop Pain Control: Uneventful            Sign Out: Well controlled pain   PONV: No   Neuro/Psych: Uneventful            Sign Out: Acceptable/Baseline neuro status   Airway/Respiratory: Uneventful            Sign Out: Acceptable/Baseline resp. status   CV/Hemodynamics: Uneventful            Sign Out: Acceptable CV status; No obvious hypovolemia; No obvious fluid overload   Other NRE: NONE   DID A NON-ROUTINE EVENT OCCUR? No           Last vitals:  Vitals Value Taken Time   /89 06/22/23 0945   Temp 96.6  F (35.9  C) 06/22/23 0945   Pulse 59 06/22/23 0945   Resp     SpO2 95 % 06/22/23 0953   Vitals shown include unvalidated device data.    Electronically Signed By: KEKE GARCIA CRNA  June 22, 2023  11:55 AM

## 2023-06-27 LAB
PATH REPORT.COMMENTS IMP SPEC: NORMAL
PATH REPORT.FINAL DX SPEC: NORMAL
PATH REPORT.RELEVANT HX SPEC: NORMAL
PHOTO IMAGE: NORMAL

## 2023-06-28 DIAGNOSIS — F41.9 ANXIETY: ICD-10-CM

## 2023-06-30 RX ORDER — BUSPIRONE HYDROCHLORIDE 15 MG/1
15 TABLET ORAL 2 TIMES DAILY PRN
Qty: 180 TABLET | Refills: 2 | Status: SHIPPED | OUTPATIENT
Start: 2023-06-30 | End: 2024-03-25

## 2023-06-30 NOTE — TELEPHONE ENCOUNTER
busPIRone (BUSPAR) 15 MG tablet          Sig: Take 1 tablet (15 mg) by mouth 2 times daily as needed (for anxiety)           Last Written Prescription Date:  3/16/22  Last Fill Quantity: 180,   # refills: 2  Last Office Visit: 6/7/23  Future Office visit:       Routing refill request to provider for review/approval because:  Drug not on the [facility], [facility] or [facility] refill protocol or controlled substance Erika Kessler RN on 6/30/2023 at 7:20 AM

## 2023-08-25 DIAGNOSIS — M54.2 NECK PAIN: ICD-10-CM

## 2023-08-25 RX ORDER — GABAPENTIN 300 MG/1
300 CAPSULE ORAL 2 TIMES DAILY PRN
Qty: 180 CAPSULE | Refills: 1 | Status: SHIPPED | OUTPATIENT
Start: 2023-08-25 | End: 2024-03-01

## 2023-08-25 NOTE — TELEPHONE ENCOUNTER
Last Prescription Date: 1/12/23  Last Qty/Refills: 180 / 1  Last Office Visit: 6/7/23  Future Office Visit: none    Corinne R Thayer, RN on 8/25/2023 at 3:18 PM       Requested Prescriptions   Pending Prescriptions Disp Refills    gabapentin (NEURONTIN) 300 MG capsule 180 capsule 1     Sig: Take 1 capsule (300 mg) by mouth 2 times daily as needed (neck pain)       There is no refill protocol information for this order

## 2023-08-25 NOTE — TELEPHONE ENCOUNTER
Reason for call: Medication or medication refill    Name of medication requested: gabapentin     Are you out of the medication? 2 days     What pharmacy do you use? [facility]     Preferred method for responding to this message: Telephone Call    Phone number patient can be reached at: Cell number on file:    Telephone Information:   Mobile [phone]       If we cannot reach you directly, may we leave a detailed response at the number you provided? Yes    Holly Rose on 8/25/2023 at 2:48 PM

## 2023-10-30 DIAGNOSIS — N52.9 ERECTILE DYSFUNCTION, UNSPECIFIED ERECTILE DYSFUNCTION TYPE: ICD-10-CM

## 2023-10-31 RX ORDER — SILDENAFIL CITRATE 20 MG/1
TABLET ORAL
Qty: 30 TABLET | Refills: 11 | Status: SHIPPED | OUTPATIENT
Start: 2023-10-31 | End: 2023-11-02

## 2023-11-02 DIAGNOSIS — N52.9 ERECTILE DYSFUNCTION, UNSPECIFIED ERECTILE DYSFUNCTION TYPE: ICD-10-CM

## 2023-11-07 RX ORDER — SILDENAFIL CITRATE 20 MG/1
TABLET ORAL
Qty: 30 TABLET | Refills: 11 | Status: SHIPPED | OUTPATIENT
Start: 2023-11-07

## 2023-12-11 ENCOUNTER — PATIENT OUTREACH (OUTPATIENT)
Dept: GASTROENTEROLOGY | Facility: CLINIC | Age: 68
End: 2023-12-11
Payer: MEDICARE

## 2023-12-12 ENCOUNTER — NURSE TRIAGE (OUTPATIENT)
Dept: FAMILY MEDICINE | Facility: OTHER | Age: 68
End: 2023-12-12
Payer: MEDICARE

## 2023-12-12 NOTE — TELEPHONE ENCOUNTER
Please call the patient.  He has had a bad cough and chest cold for a while.  He wants to know if he should be seen.        Anette Jauregui on 12/12/2023 at 9:27 AM

## 2023-12-13 ENCOUNTER — OFFICE VISIT (OUTPATIENT)
Dept: FAMILY MEDICINE | Facility: OTHER | Age: 68
End: 2023-12-13
Attending: NURSE PRACTITIONER
Payer: MEDICARE

## 2023-12-13 ENCOUNTER — HOSPITAL ENCOUNTER (OUTPATIENT)
Dept: GENERAL RADIOLOGY | Facility: OTHER | Age: 68
Discharge: HOME | End: 2023-12-13
Attending: NURSE PRACTITIONER
Payer: MEDICARE

## 2023-12-13 VITALS
TEMPERATURE: 99.6 F | BODY MASS INDEX: 24.56 KG/M2 | HEART RATE: 92 BPM | DIASTOLIC BLOOD PRESSURE: 80 MMHG | HEIGHT: 69 IN | SYSTOLIC BLOOD PRESSURE: 138 MMHG | WEIGHT: 165.8 LBS | RESPIRATION RATE: 20 BRPM | OXYGEN SATURATION: 95 %

## 2023-12-13 DIAGNOSIS — Z87.891 HISTORY OF TOBACCO ABUSE: ICD-10-CM

## 2023-12-13 DIAGNOSIS — R05.1 ACUTE COUGH: Primary | ICD-10-CM

## 2023-12-13 DIAGNOSIS — J22 LOWER RESPIRATORY INFECTION (E.G., BRONCHITIS, PNEUMONIA, PNEUMONITIS, PULMONITIS): ICD-10-CM

## 2023-12-13 DIAGNOSIS — J44.1 COPD EXACERBATION (H): ICD-10-CM

## 2023-12-13 DIAGNOSIS — J43.9 MILD EMPHYSEMA (H): ICD-10-CM

## 2023-12-13 DIAGNOSIS — R05.1 ACUTE COUGH: ICD-10-CM

## 2023-12-13 PROCEDURE — G0463 HOSPITAL OUTPT CLINIC VISIT: HCPCS

## 2023-12-13 PROCEDURE — G0463 HOSPITAL OUTPT CLINIC VISIT: HCPCS | Mod: 25

## 2023-12-13 PROCEDURE — 71046 X-RAY EXAM CHEST 2 VIEWS: CPT

## 2023-12-13 PROCEDURE — 99214 OFFICE O/P EST MOD 30 MIN: CPT | Performed by: NURSE PRACTITIONER

## 2023-12-13 RX ORDER — AZITHROMYCIN 250 MG/1
TABLET, FILM COATED ORAL
Qty: 6 TABLET | Refills: 0 | Status: SHIPPED | OUTPATIENT
Start: 2023-12-13 | End: 2023-12-18

## 2023-12-13 RX ORDER — PREDNISONE 20 MG/1
40 TABLET ORAL DAILY
Qty: 10 TABLET | Refills: 0 | Status: SHIPPED | OUTPATIENT
Start: 2023-12-13 | End: 2023-12-18

## 2023-12-13 ASSESSMENT — ENCOUNTER SYMPTOMS: COUGH: 1

## 2023-12-13 NOTE — PROGRESS NOTES
Assessment & Plan   Problem List Items Addressed This Visit          Respiratory    Mild emphysema (H)    Relevant Medications    predniSONE (DELTASONE) 20 MG tablet       Behavioral    History of tobacco abuse     Other Visit Diagnoses       Acute cough    -  Primary    Relevant Orders    XR Chest 2 Views (Completed)    COPD exacerbation (H)        Relevant Medications    azithromycin (ZITHROMAX) 250 MG tablet    predniSONE (DELTASONE) 20 MG tablet    Lower respiratory infection (e.g., bronchitis, pneumonia, pneumonitis, pulmonitis)        Relevant Medications    azithromycin (ZITHROMAX) 250 MG tablet    predniSONE (DELTASONE) 20 MG tablet           Previous CT scan shows mild emphysema, cough that has been persistent and recently worsening after previous viral illness.  Will treat this with meds and prednisone for lower respiratory infection/COPD exacerbation.  Discussed signs and symptoms order follow-up.  Review of the result(s) of each unique test - CXR  Ordering of each unique test  Prescription drug management             No follow-ups on file.    [facility]    [facility]   Josué is a 68 year old, presenting for the following health issues:  Cough      History of Present Illness       Reason for visit:  Cough  Symptom onset:  1-2 weeks ago  Symptoms include:  Cough, nasal congestion, wheezing  Symptom intensity:  Severe  Symptom progression:  Staying the same  Had these symptoms before:  No      Patient comes in today for evaluation of a cough.  He reports couple weeks ago he developed upper respiratory symptoms, his last approximately 4 to 5 days.  He started to get better but cough never resolved.  Over this past several days cough has worsened.  Cough is deep, dry.  He is not having any fevers.  He has not done any COVID testing.  He does have a history of tobacco use, gets annual CT screening which showed mild emphysema.  He has been treating his current  "symptoms with over-the-counter cough medication.        Review of Systems   Respiratory:  Positive for cough.             Objective    /80   Pulse 92   Temp 99.6  F (37.6  C)   Resp 20   Ht 1.753 m (5' 9\")   Wt 75.2 kg (165 lb 12.8 oz)   SpO2 95%   BMI 24.48 kg/m    Body mass index is 24.48 kg/m .  Physical Exam   GENERAL: healthy, alert and no distress  EYES: Eyes grossly normal to inspection, PERRL and conjunctivae and sclerae normal  HENT: ear canals and TM's normal, nose and mouth without ulcers or lesions  NECK: no adenopathy, no asymmetry, masses, or scars and thyroid normal to palpation  RESP: O2 sats 95% on room air.  Right upper and lower lobes with rhonchi noted  CV: regular rate and rhythm, normal S1 S2  NEURO: Normal strength and tone, mentation intact and speech normal  PSYCH: mentation appears normal, affect normal/bright    Results for orders placed or performed during the hospital encounter of 12/13/23   XR Chest 2 Views     Status: None    Narrative    PROCEDURE: XR CHEST 2 VIEWS 12/13/2023 1:31 PM    HISTORY: Acute cough    COMPARISONS: 6/7/2023.    TECHNIQUE: 2 views.    FINDINGS: Heart and pulmonary vasculature are normal. Lungs are clear  and no pleural effusion is seen.    Degenerative change is seen in the mid thoracic spine.         Impression    IMPRESSION: No acute infiltrate.    RAYRAY CA MD         SYSTEM ID:  [id number]                 "

## 2023-12-13 NOTE — NURSING NOTE
Patient presents today for cough over the last 8-9 days.    Medication Reconciliation Complete    Corry Montes LPN  12/13/2023 1:17 PM

## 2023-12-19 ENCOUNTER — TELEPHONE (OUTPATIENT)
Dept: FAMILY MEDICINE | Facility: OTHER | Age: 68
End: 2023-12-19
Payer: MEDICARE

## 2023-12-19 NOTE — TELEPHONE ENCOUNTER
He was given azithromycin, daily for 5 days.  I do not expect that he will be 100% better at the completion of antibiotics.  These are continuing to work, cough can linger for 1 to 2 weeks after infection has been treated.  Would recommend follow-up if he notices worsening symptoms or other concerns. Denisse Perdue, KEKE CNP on 12/19/2023 at 12:50 PM

## 2023-12-19 NOTE — TELEPHONE ENCOUNTER
After verifying patient's name and date of birth, patient was given the below information.  Christel Dixon....12/19/2023 1:36 PM

## 2023-12-19 NOTE — TELEPHONE ENCOUNTER
Patient finished his 4 days of antibiotics, states he is better but not totally back to normal, wondering what to do      Zion Li on 12/19/2023 at 12:29 PM

## 2024-01-19 ENCOUNTER — TELEPHONE (OUTPATIENT)
Dept: FAMILY MEDICINE | Facility: OTHER | Age: 69
End: 2024-01-19
Payer: MEDICARE

## 2024-01-19 NOTE — TELEPHONE ENCOUNTER
Discussed immunizations with pt.  He is due for a 2nd Shingles injection and the RSV.  He will go to the pharmacy to get these at his convenience.    Shelley Rice LPN on 1/19/2024 at 3:42 PM

## 2024-02-14 DIAGNOSIS — J30.1 ALLERGIC RHINITIS DUE TO POLLEN, UNSPECIFIED SEASONALITY: ICD-10-CM

## 2024-02-19 RX ORDER — FLUTICASONE PROPIONATE 50 MCG
SPRAY, SUSPENSION (ML) NASAL
Qty: 16 ML | Refills: 4 | Status: SHIPPED | OUTPATIENT
Start: 2024-02-19 | End: 2024-05-13

## 2024-02-19 NOTE — TELEPHONE ENCOUNTER
[facility] sent Rx request for the following:      Requested Prescriptions   Pending Prescriptions Disp Refills    fluticasone (FLONASE) 50 MCG/ACT nasal spray [Pharmacy Med Name: FLUTICASONE PROP 50 MCG SPRAY] 16 mL 4     Sig: SPRAY 1 SPRAY INTO EACH NOSTRIL DAILY       Nasal Allergy Protocol Failed - 2/19/2024 10:46 AM     Last Prescription Date:   2/9/23  Last Fill Qty/Refills:         16 ml, R-4    Last Office Visit:              12/13/23   Future Office visit:           none     Magalis Tovar RN on 2/19/2024 at 10:47 AM

## 2024-02-27 DIAGNOSIS — M54.2 NECK PAIN: ICD-10-CM

## 2024-02-27 DIAGNOSIS — E78.5 HYPERLIPIDEMIA, UNSPECIFIED HYPERLIPIDEMIA TYPE: ICD-10-CM

## 2024-02-27 DIAGNOSIS — K21.00 GASTROESOPHAGEAL REFLUX DISEASE WITH ESOPHAGITIS, UNSPECIFIED WHETHER HEMORRHAGE: ICD-10-CM

## 2024-03-04 RX ORDER — ATORVASTATIN CALCIUM 20 MG/1
20 TABLET, FILM COATED ORAL AT BEDTIME
Qty: 90 TABLET | Refills: 3 | Status: SHIPPED | OUTPATIENT
Start: 2024-03-04

## 2024-03-04 RX ORDER — GABAPENTIN 300 MG/1
300 CAPSULE ORAL 2 TIMES DAILY PRN
Qty: 180 CAPSULE | Refills: 1 | Status: SHIPPED | OUTPATIENT
Start: 2024-03-04 | End: 2024-07-11

## 2024-03-25 ENCOUNTER — OFFICE VISIT (OUTPATIENT)
Dept: FAMILY MEDICINE | Facility: OTHER | Age: 69
End: 2024-03-25
Attending: PHYSICIAN ASSISTANT
Payer: MEDICARE

## 2024-03-25 VITALS
HEIGHT: 69 IN | WEIGHT: 164 LBS | OXYGEN SATURATION: 95 % | RESPIRATION RATE: 20 BRPM | DIASTOLIC BLOOD PRESSURE: 62 MMHG | SYSTOLIC BLOOD PRESSURE: 100 MMHG | HEART RATE: 68 BPM | TEMPERATURE: 97.6 F | BODY MASS INDEX: 24.29 KG/M2

## 2024-03-25 DIAGNOSIS — Z00.00 ENCOUNTER FOR MEDICARE ANNUAL WELLNESS EXAM: Primary | ICD-10-CM

## 2024-03-25 DIAGNOSIS — J43.9 MILD EMPHYSEMA (H): ICD-10-CM

## 2024-03-25 DIAGNOSIS — I70.0 ABDOMINAL AORTIC ATHEROSCLEROSIS (H): ICD-10-CM

## 2024-03-25 DIAGNOSIS — E78.5 HYPERLIPIDEMIA, UNSPECIFIED HYPERLIPIDEMIA TYPE: ICD-10-CM

## 2024-03-25 DIAGNOSIS — M54.2 NECK PAIN: ICD-10-CM

## 2024-03-25 DIAGNOSIS — Z87.891 PERSONAL HISTORY OF TOBACCO USE: ICD-10-CM

## 2024-03-25 DIAGNOSIS — K21.00 GASTROESOPHAGEAL REFLUX DISEASE WITH ESOPHAGITIS, UNSPECIFIED WHETHER HEMORRHAGE: ICD-10-CM

## 2024-03-25 DIAGNOSIS — C61 PROSTATE CANCER (H): ICD-10-CM

## 2024-03-25 DIAGNOSIS — F10.21 ALCOHOL DEPENDENCE IN REMISSION (H): ICD-10-CM

## 2024-03-25 DIAGNOSIS — F41.9 ANXIETY: ICD-10-CM

## 2024-03-25 DIAGNOSIS — J30.1 ALLERGIC RHINITIS DUE TO POLLEN, UNSPECIFIED SEASONALITY: ICD-10-CM

## 2024-03-25 DIAGNOSIS — N52.9 ERECTILE DYSFUNCTION, UNSPECIFIED ERECTILE DYSFUNCTION TYPE: ICD-10-CM

## 2024-03-25 LAB
ALBUMIN SERPL BCG-MCNC: 4.7 G/DL (ref 3.5–5.2)
ALP SERPL-CCNC: 60 U/L (ref 40–150)
ALT SERPL W P-5'-P-CCNC: 36 U/L (ref 0–70)
ANION GAP SERPL CALCULATED.3IONS-SCNC: 12 MMOL/L (ref 7–15)
AST SERPL W P-5'-P-CCNC: 28 U/L (ref 0–45)
BASOPHILS # BLD AUTO: 0.1 10E3/UL (ref 0–0.2)
BASOPHILS NFR BLD AUTO: 1 %
BILIRUB SERPL-MCNC: 0.9 MG/DL
BUN SERPL-MCNC: 12 MG/DL (ref 8–23)
CALCIUM SERPL-MCNC: 10.2 MG/DL (ref 8.8–10.2)
CHLORIDE SERPL-SCNC: 98 MMOL/L (ref 98–107)
CHOLEST SERPL-MCNC: 173 MG/DL
CREAT SERPL-MCNC: 0.88 MG/DL (ref 0.67–1.17)
DEPRECATED HCO3 PLAS-SCNC: 27 MMOL/L (ref 22–29)
EGFRCR SERPLBLD CKD-EPI 2021: >90 ML/MIN/1.73M2
EOSINOPHIL # BLD AUTO: 0.5 10E3/UL (ref 0–0.7)
EOSINOPHIL NFR BLD AUTO: 5 %
ERYTHROCYTE [DISTWIDTH] IN BLOOD BY AUTOMATED COUNT: 12.7 % (ref 10–15)
FASTING STATUS PATIENT QL REPORTED: ABNORMAL
GLUCOSE SERPL-MCNC: 81 MG/DL (ref 70–99)
HCT VFR BLD AUTO: 43.8 % (ref 40–53)
HDLC SERPL-MCNC: 43 MG/DL
HGB BLD-MCNC: 15.2 G/DL (ref 13.3–17.7)
IMM GRANULOCYTES # BLD: 0 10E3/UL
IMM GRANULOCYTES NFR BLD: 0 %
LDLC SERPL CALC-MCNC: 101 MG/DL
LYMPHOCYTES # BLD AUTO: 2.8 10E3/UL (ref 0.8–5.3)
LYMPHOCYTES NFR BLD AUTO: 29 %
MCH RBC QN AUTO: 32.1 PG (ref 26.5–33)
MCHC RBC AUTO-ENTMCNC: 34.7 G/DL (ref 31.5–36.5)
MCV RBC AUTO: 92 FL (ref 78–100)
MONOCYTES # BLD AUTO: 0.9 10E3/UL (ref 0–1.3)
MONOCYTES NFR BLD AUTO: 9 %
NEUTROPHILS # BLD AUTO: 5.6 10E3/UL (ref 1.6–8.3)
NEUTROPHILS NFR BLD AUTO: 57 %
NONHDLC SERPL-MCNC: 130 MG/DL
NRBC # BLD AUTO: 0 10E3/UL
NRBC BLD AUTO-RTO: 0 /100
PLATELET # BLD AUTO: 172 10E3/UL (ref 150–450)
POTASSIUM SERPL-SCNC: 4.2 MMOL/L (ref 3.4–5.3)
PROT SERPL-MCNC: 7.4 G/DL (ref 6.4–8.3)
RBC # BLD AUTO: 4.74 10E6/UL (ref 4.4–5.9)
SODIUM SERPL-SCNC: 137 MMOL/L (ref 135–145)
TRIGL SERPL-MCNC: 147 MG/DL
WBC # BLD AUTO: 9.8 10E3/UL (ref 4–11)

## 2024-03-25 PROCEDURE — G0439 PPPS, SUBSEQ VISIT: HCPCS | Performed by: PHYSICIAN ASSISTANT

## 2024-03-25 PROCEDURE — 85025 COMPLETE CBC W/AUTO DIFF WBC: CPT | Mod: ZL | Performed by: PHYSICIAN ASSISTANT

## 2024-03-25 PROCEDURE — 80053 COMPREHEN METABOLIC PANEL: CPT | Mod: ZL | Performed by: PHYSICIAN ASSISTANT

## 2024-03-25 PROCEDURE — G0463 HOSPITAL OUTPT CLINIC VISIT: HCPCS | Mod: 25

## 2024-03-25 PROCEDURE — 36415 COLL VENOUS BLD VENIPUNCTURE: CPT | Mod: ZL | Performed by: PHYSICIAN ASSISTANT

## 2024-03-25 PROCEDURE — 99214 OFFICE O/P EST MOD 30 MIN: CPT | Mod: 25 | Performed by: PHYSICIAN ASSISTANT

## 2024-03-25 PROCEDURE — 82465 ASSAY BLD/SERUM CHOLESTEROL: CPT | Mod: ZL | Performed by: PHYSICIAN ASSISTANT

## 2024-03-25 PROCEDURE — G0296 VISIT TO DETERM LDCT ELIG: HCPCS | Performed by: PHYSICIAN ASSISTANT

## 2024-03-25 RX ORDER — GABAPENTIN 300 MG/1
300 CAPSULE ORAL 2 TIMES DAILY PRN
Qty: 180 CAPSULE | Refills: 1 | Status: CANCELLED | OUTPATIENT
Start: 2024-03-25

## 2024-03-25 RX ORDER — FLUTICASONE PROPIONATE 50 MCG
SPRAY, SUSPENSION (ML) NASAL
Qty: 16 ML | Refills: 4 | Status: CANCELLED | OUTPATIENT
Start: 2024-03-25

## 2024-03-25 RX ORDER — ATORVASTATIN CALCIUM 20 MG/1
20 TABLET, FILM COATED ORAL AT BEDTIME
Qty: 90 TABLET | Refills: 3 | Status: CANCELLED | OUTPATIENT
Start: 2024-03-25

## 2024-03-25 RX ORDER — BUSPIRONE HYDROCHLORIDE 15 MG/1
15 TABLET ORAL 2 TIMES DAILY PRN
Qty: 180 TABLET | Refills: 4 | Status: SHIPPED | OUTPATIENT
Start: 2024-03-25

## 2024-03-25 RX ORDER — SILDENAFIL CITRATE 20 MG/1
TABLET ORAL
Qty: 30 TABLET | Refills: 11 | Status: CANCELLED | OUTPATIENT
Start: 2024-03-25

## 2024-03-25 SDOH — HEALTH STABILITY: PHYSICAL HEALTH: ON AVERAGE, HOW MANY DAYS PER WEEK DO YOU ENGAGE IN MODERATE TO STRENUOUS EXERCISE (LIKE A BRISK WALK)?: 1 DAY

## 2024-03-25 SDOH — HEALTH STABILITY: PHYSICAL HEALTH: ON AVERAGE, HOW MANY MINUTES DO YOU ENGAGE IN EXERCISE AT THIS LEVEL?: 30 MIN

## 2024-03-25 ASSESSMENT — PATIENT HEALTH QUESTIONNAIRE - PHQ9
10. IF YOU CHECKED OFF ANY PROBLEMS, HOW DIFFICULT HAVE THESE PROBLEMS MADE IT FOR YOU TO DO YOUR WORK, TAKE CARE OF THINGS AT HOME, OR GET ALONG WITH OTHER PEOPLE: NOT DIFFICULT AT ALL
SUM OF ALL RESPONSES TO PHQ QUESTIONS 1-9: 3
SUM OF ALL RESPONSES TO PHQ QUESTIONS 1-9: 3

## 2024-03-25 ASSESSMENT — ANXIETY QUESTIONNAIRES
8. IF YOU CHECKED OFF ANY PROBLEMS, HOW DIFFICULT HAVE THESE MADE IT FOR YOU TO DO YOUR WORK, TAKE CARE OF THINGS AT HOME, OR GET ALONG WITH OTHER PEOPLE?: SOMEWHAT DIFFICULT
IF YOU CHECKED OFF ANY PROBLEMS ON THIS QUESTIONNAIRE, HOW DIFFICULT HAVE THESE PROBLEMS MADE IT FOR YOU TO DO YOUR WORK, TAKE CARE OF THINGS AT HOME, OR GET ALONG WITH OTHER PEOPLE: SOMEWHAT DIFFICULT
3. WORRYING TOO MUCH ABOUT DIFFERENT THINGS: SEVERAL DAYS
6. BECOMING EASILY ANNOYED OR IRRITABLE: SEVERAL DAYS
5. BEING SO RESTLESS THAT IT IS HARD TO SIT STILL: SEVERAL DAYS
GAD7 TOTAL SCORE: 6
1. FEELING NERVOUS, ANXIOUS, OR ON EDGE: NOT AT ALL
7. FEELING AFRAID AS IF SOMETHING AWFUL MIGHT HAPPEN: SEVERAL DAYS
GAD7 TOTAL SCORE: 6
7. FEELING AFRAID AS IF SOMETHING AWFUL MIGHT HAPPEN: SEVERAL DAYS
4. TROUBLE RELAXING: SEVERAL DAYS
2. NOT BEING ABLE TO STOP OR CONTROL WORRYING: SEVERAL DAYS
GAD7 TOTAL SCORE: 6

## 2024-03-25 ASSESSMENT — SOCIAL DETERMINANTS OF HEALTH (SDOH): HOW OFTEN DO YOU GET TOGETHER WITH FRIENDS OR RELATIVES?: THREE TIMES A WEEK

## 2024-03-25 ASSESSMENT — PAIN SCALES - GENERAL: PAINLEVEL: NO PAIN (0)

## 2024-03-25 NOTE — LETTER
My COPD Action Plan     Name: Josué Álvarez    YOB: 1955   Date: 3/25/2024    My doctor: Magalis Fajardo PA-C   My clinic: [facility]    [address]  [place] [id number] [phone]  My Controller Medicine:  None   Dose: None     My Rescue Medicine: Albuterol (Proair/Ventolin/Proventil) inhaler   Dose: 1-2 puffs as needed     My Flare Up Medicine:  None   Dose: None     My COPD Severity: Mild = FeV1 > 80%      Use of Oxygen: Oxygen Not Prescribed      Make sure you've had your pneumonia   vaccines.          GREEN ZONE       Doing well today    Usual level of activity and exercise  Usual amount of cough and mucus  No shortness of breath  Usual level of health (thinking clearly, sleeping well, feel like eating) Actions:    Take daily medicines  Use oxygen as prescribed  Follow regular exercise and diet plan  Avoid cigarette smoke and other irritants that harm the lungs           YELLOW ZONE          Having a bad day or flare up    Short of breath more than usual  A lot more sputum (mucus) than usual  Sputum looks yellow, green, tan, brown or bloody  More coughing or wheezing  Fever or chills  Less energy; trouble completing activities  Trouble thinking or focusing  Using quick relief inhaler or nebulizer more often  Poor sleep; symptoms wake me up  Do not feel like eating Actions:    Get plenty of rest  Take daily medicines  Use quick relief inhaler every 1-2 hours  If you use oxygen, call you doctor to see if you should adjust your oxygen  Do breathing exercises or other things to help you relax  Let a loved one, friend or neighbor know you are feeling worse  Call your care team if you have 2 or more symptoms.  Start taking steroids or antibiotics if directed by your care team           RED ZONE       Need medical care now    Severe shortness of breath (feel you can't breathe)  Fever, chills  Not enough breath to do any activity  Trouble coughing up mucus,  walking or talking  Blood in mucus  Frequent coughing Rescue medicines are not working  Not able to sleep because of breathing  Feel confused or drowsy  Chest pain    Actions:    Call your health care team.  If you cannot reach your care team, call 911 or go to the emergency room.        Annual Reminders:  Meet with Care Team, Flu Shot every Fall  [id number] [place] [address]

## 2024-03-25 NOTE — PROGRESS NOTES
Preventive Care Visit  [facility] AND [facility]  Magalis Fajardo PA-C, Family Medicine  Mar 25, 2024      Assessment & Plan       ICD-10-CM    1. Encounter for Medicare annual wellness exam  Z00.00       2. Hyperlipidemia, unspecified hyperlipidemia type  E78.5 CBC and Differential     Comprehensive Metabolic Panel     Lipid Panel      3. Anxiety  F41.9 busPIRone (BUSPAR) 15 MG tablet      4. Allergic rhinitis due to pollen, unspecified seasonality  J30.1       5. Neck pain  M54.2       6. Gastroesophageal reflux disease with esophagitis, unspecified whether hemorrhage  K21.00       7. Erectile dysfunction, unspecified erectile dysfunction type  N52.9       8. Mild emphysema (H)  J43.9       9. Abdominal aortic atherosclerosis (H24)  I70.0       10. Alcohol dependence in remission (H)  F10.21       11. Prostate cancer (H)  C61       12. Personal history of tobacco use  Z87.891 Prof fee: Shared Decision Making for Lung Cancer Screening     CT Chest Lung Cancer Scrn Low Dose wo        Medicare annual wellness visit completed today.  Discussed care gaps.  Due for second Shingrix immunization in the next 2 to 3 months.  He will schedule this at his convenience.  Josué is aware that due to Medicare regulations that he will have to receive the shingles immunization at one of the local pharmacies.  Mixed hyperlipidemia, nonfasting lipid panel obtained today.  LDL minimally above goal of 100 (returning at 101).  Cholesterol, triglycerides and HDL are all at goal.  Continue statin at current dosing.  Reviewed heart healthy diet.  Generalized anxiety, overall extremely stable.  Utilizing BuSpar on average once daily.  Reviewed stress management/coping techniques.  Return precautions reviewed.  Refill BuSpar today.  Allergic rhinitis due to pollen, current exacerbation of allergies with increased postnasal drip noted today.  Recommend increase Flonase to 2 sprays in each nostril daily, recommend to increase for 1  month.  If ongoing or persistent would recommend addition of over-the-counter antihistamine or montelukast/Singulair.  Chronic neck pain, this is improved since seen last.  Good range of motion and strength exhibited today.  Continue with use as tolerated.  If persisting or worsening would recommend reevaluation for possible updated plain films.  Discussed ongoing physical therapy or chiropractic care to promote range of motion and strength.   Acid reflux, stable on current regimen of omeprazole.  Continue to limit NSAIDs and other triggering agents (acidic, spicy and greasy foods).  Recommend to refrain from alcohol and tobacco.  Refill omeprazole today.  Erectile dysfunction, stable, as needed use of sildenafil.  Aware of risk, benefits and potential side effects of medication (in particular reviewed interaction of nitroglycerin and sildenafil).  Mild emphysema, does not require controller inhaler use.  Stable on as needed regimen of albuterol.  Typically has flares with URIs and seasonal allergies.  Up-to-date on prescription for albuterol.  Return precautions reviewed.  COPD action plan updated today.  Abdominal aortic atherosclerosis, rationale for statin therapy.  Continue statin therapy, see #2.  Alcohol dependence in remission, congratulated on ongoing sobriety.  Prostate cancer, chronic in nature.  [facility].  Unfortunately, this is not available for review within patient's chart at the time of documentation.  He does have an upcoming urology visit later this week with [facility] urology.  Ongoing close follow-up.  Personal history of tobacco use, quit approximately 10 years ago.  Greater than 20-pack-year history of tobacco use.  Meets criteria for CT lung cancer screening, this was ordered today.  See separate note for shared decision making.    Patient has been advised of split billing requirements and indicates understanding: Yes    Counseling  Appropriate preventive  services were discussed with this patient, including applicable screening as appropriate for fall prevention, nutrition, physical activity, Tobacco-use cessation, weight loss and cognition.  Checklist reviewing preventive services available has been given to the patient.  Reviewed patient's diet, addressing concerns and/or questions.   He is at risk for lack of exercise and has been provided with information to increase physical activity for the benefit of his well-being.   The patient was instructed to see the dentist every 6 months.   He is at risk for psychosocial distress and has been provided with information to reduce risk.   Discussed possible causes of fatigue.     See Patient Instructions    No follow-ups on file.    Tamara Moses is a 68 year old, presenting for the following:  Medicare Visit (wellness)        3/25/2024     3:13 PM   Additional Questions   Roomed by robert tijerina lpn         Health Care Directive  Patient does not have a Health Care Directive or Living Will: Discussed advance care planning with patient; information given to patient to review.    MICHELLE Moses presents to the clinic today for Medicare annual wellness visit and to discuss the following:  Prostate cancer, originally diagnosed in October 2013.  He has primarily followed local with urology, however, Dr. Dawson left the practice in February 2023, patient then establish care with [facility] urology.  He had an updated PSA today, wondering if his results.  He states his PSA has been bouncing between 7 and 14.  He had a negative MRI of his prostate in [place] in August 2023.  He states his cancer diagnosis has been quite complex as this was initially diagnosed on the above date, he had a positive biopsy in 2013, repeat sampling 2 to 3 months later was negative.  He does have an active diagnosis of BPH as well, he is asymptomatic.  Ongoing intermittent cough.  He states he has had 3-4 chest x-rays in the last year.  Most recently in  2023 he was diagnosed with a bronchitis/COPD exacerbation.  He was started on azithromycin and a prednisone burst.  This nearly cleared up his symptoms however, he did have some residual.  Over the last few weeks he reports a deep cough and some irritation in his throat, he is wondering if we can listen to his lungs today.  He is also wondering when he will be due for his next CT lung cancer screening, quit smoking in , approximately 30-pack-year history.    Hyperlipidemia Follow-Up    Are you regularly taking any medication or supplement to lower your cholesterol?   Yes- statin  Are you having muscle aches or other side effects that you think could be caused by your cholesterol lowering medication?  No    Hypertension Follow-up    Do you check your blood pressure regularly outside of the clinic? No   Are you following a low salt diet? Yes  Are your blood pressures ever more than 140 on the top number (systolic) OR more   than 90 on the bottom number (diastolic), for example 140/90? unsure    Vascular Disease Follow-up    How often do you take nitroglycerin? Never  Do you take an aspirin every day? Yes    Anxiety   How are you doing with your anxiety since your last visit? No change  Are you having other symptoms that might be associated with anxiety? No  Have you had a significant life event? No   Are you feeling depressed? No  Do you have any concerns with your use of alcohol or other drugs? No    Social History     Tobacco Use    Smoking status: Former     Packs/day: 0.50     Years: 25.00     Additional pack years: 0.00     Total pack years: 12.50     Types: Cigarettes     Quit date: 10/30/2014     Years since quittin.4    Smokeless tobacco: Former     Quit date:    Vaping Use    Vaping Use: Never used   Substance Use Topics    Alcohol use: Yes     Comment: occasional    Drug use: No         2021     1:55 PM 1/10/2022     2:18 PM 3/25/2024     2:51 PM   MINI-7 SCORE   Total Score  2  "(minimal anxiety) 6 (mild anxiety)   Total Score 2 2 6         7/15/2019     3:11 PM 1/10/2022     2:17 PM 3/25/2024     2:50 PM   PHQ   PHQ-9 Total Score 0 0 3   Q9: Thoughts of better off dead/self-harm past 2 weeks Not at all Not at all Not at all     COPD Follow-Up  Overall, how are your COPD symptoms since your last clinic visit?  Better  How much fatigue or shortness of breath do you have when you are walking?  None  How much shortness of breath do you have when you are resting?  None  How often do you cough? Rarely  Have you noticed any change in your sputum/phlegm?  No  Have you experienced a recent fever? No  Please describe how far you can walk without stopping to rest:  1-2 miles  How many flights of stairs are you able to walk up without stopping?  3 or more  Have you had any Emergency Room Visits, Urgent Care Visits, or Hospital Admissions because of your COPD since your last office visit?  No    History   Smoking Status    Former    Packs/day: 0.50    Years: 25.00    Types: Cigarettes    Quit date: 10/30/2014   Smokeless Tobacco    Former    Quit date: 2018     No results found for: \"FEV1\", \"DHW2KSJ\"        3/25/2024   General Health   How would you rate your overall physical health? (!) FAIR   Feel stress (tense, anxious, or unable to sleep) Only a little   (!) STRESS CONCERN      3/25/2024   Nutrition   Diet: I don't know         3/25/2024   Exercise   Days per week of moderate/strenous exercise 1 day   Average minutes spent exercising at this level 30 min   (!) EXERCISE CONCERN      3/25/2024   Social Factors   Frequency of gathering with friends or relatives Three times a week   Worry food won't last until get money to buy more No   Food not last or not have enough money for food? No   Do you have housing?  No   Are you worried about losing your housing? No   Lack of transportation? No   Unable to get utilities (heat,electricity)? No   Want help with housing or utility concern? No   (!) HOUSING " CONCERN PRESENT      3/25/2024   Activities of Daily Living- Home Safety   Needs help with the following daily activites None of the above   Safety concerns in the home None of the above         3/25/2024   Dental   Dentist two times every year? (!) NO         3/25/2024   Hearing Screening   Hearing concerns? None of the above         3/25/2024   Driving Risk Screening   Patient/family members have concerns about driving No         3/25/2024   General Alertness/Fatigue Screening   Have you been more tired than usual lately? (!) YES         3/25/2024   Urinary Incontinence Screening   Bothered by leaking urine in past 6 months No         3/25/2024   TB Screening   Were you born outside of the [place]? No       Today's PHQ-9 Score:       3/25/2024     2:50 PM   PHQ-9 SCORE   PHQ-9 Total Score MyChart 3 (Minimal depression)   PHQ-9 Total Score 3         3/25/2024   Substance Use   Alcohol more than 3/day or more than 7/wk No   Do you have a current opioid prescription? No   How severe/bad is pain from 1 to 10? 1/10   Do you use any other substances recreationally? No     Social History     Tobacco Use    Smoking status: Former     Packs/day: 0.50     Years: 25.00     Additional pack years: 0.00     Total pack years: 12.50     Types: Cigarettes     Quit date: 10/30/2014     Years since quittin.4    Smokeless tobacco: Former     Quit date:    Vaping Use    Vaping Use: Never used   Substance Use Topics    Alcohol use: Yes     Comment: occasional    Drug use: No       Last PSA:   PSA Tumor Marker   Date Value Ref Range Status   04/10/2023 13.80 (H) 0.00 - 4.50 ng/mL Final   2022 9.91 (H) 0.00 - 4.00 ug/L Final     ASCVD Risk   The 10-year ASCVD risk score (John IVAN, et al., 2019) is: 9.1%    Values used to calculate the score:      Age: 68 years      Sex: Male      Is Non- : No      Diabetic: No      Tobacco smoker: No      Systolic Blood Pressure: 100 mmHg      Is BP treated:  No      HDL Cholesterol: 44 mg/dL      Total Cholesterol: 159 mg/dL        Reviewed and updated as needed this visit by Provider                    Past Medical History:   Diagnosis Date    Abdominal aortic atherosclerosis (H24)     Anxiety     Cyst of right kidney     Diverticulitis of intestine without perforation or abscess without bleeding     No Comments Provided    Gout of foot     History of tobacco abuse     Hyperlipidemia     Prostate cancer (H)     Tubular adenoma      Past Surgical History:   Procedure Laterality Date    ARTHROSCOPY SHOULDER RT/LT Left 1980s    BIOPSY PROSTATE TRANSRECTAL  10/2016    COLONOSCOPY N/A 04/06/2018    F/U Colonoscopy 2023; tubular adenoma    COLONOSCOPY N/A 06/22/2023    2 Tubular Adenomas Follow up 5 yrs.     Current providers sharing in care for this patient include:  Patient Care Team:  Magalis Fajardo PA-C as PCP - General (Family Medicine)  Matt Dawson MD as Assigned Surgical Provider  Magalis Fajardo PA-C as Assigned PCP    The following health maintenance items are reviewed in Epic and correct as of today:  Health Maintenance   Topic Date Due    SPIROMETRY  Never done    COPD ACTION PLAN  Never done    LIPID  03/10/2024    ZOSTER IMMUNIZATION (3 of 3) 03/22/2024    LUNG CANCER SCREENING  04/03/2024    MEDICARE ANNUAL WELLNESS VISIT  03/25/2025    FALL RISK ASSESSMENT  03/25/2025    GLUCOSE  03/10/2026    DTAP/TDAP/TD IMMUNIZATION (2 - Td or Tdap) 12/01/2027    ADVANCE CARE PLANNING  03/14/2028    COLORECTAL CANCER SCREENING  06/22/2028    HEPATITIS C SCREENING  Completed    PHQ-2 (once per calendar year)  Completed    INFLUENZA VACCINE  Completed    Pneumococcal Vaccine: 65+ Years  Completed    RSV VACCINE (Pregnancy & 60+)  Completed    AORTIC ANEURYSM SCREENING (SYSTEM ASSIGNED)  Completed    COVID-19 Vaccine  Completed    IPV IMMUNIZATION  Aged Out    HPV IMMUNIZATION  Aged Out    MENINGITIS IMMUNIZATION  Aged Out    RSV MONOCLONAL ANTIBODY  Aged Out  "    Review of Systems  Constitutional, HEENT, cardiovascular, pulmonary, GI, , musculoskeletal, neuro, skin, endocrine and psych systems are negative, except as otherwise noted.     Objective    Exam  /62 (BP Location: Left arm, Patient Position: Sitting, Cuff Size: Adult Regular)   Pulse 68   Temp 97.6  F (36.4  C) (Tympanic)   Resp 20   Ht 1.753 m (5' 9\")   Wt 74.4 kg (164 lb)   SpO2 95%   BMI 24.22 kg/m     Estimated body mass index is 24.22 kg/m  as calculated from the following:    Height as of this encounter: 1.753 m (5' 9\").    Weight as of this encounter: 74.4 kg (164 lb).    Physical Exam  GENERAL: alert and no distress  EYES: Eyes grossly normal to inspection, PERRL and conjunctivae and sclerae normal  HENT: normal cephalic/atraumatic, ear canals and TM's normal, nose and mouth without ulcers or lesions, oropharynx clear, oral mucous membranes moist, and + prominent clear post nasal drip  NECK: no adenopathy, no asymmetry, masses, or scars  RESP: lungs clear to auscultation - no rales, rhonchi or wheezes  CV: regular rate and rhythm, normal S1 S2, no S3 or S4, no murmur, click or rub, no peripheral edema  ABDOMEN: soft, nontender, no hepatosplenomegaly, no masses and bowel sounds normal  MS: Dupuytren's contracture between digits 4 and 5 of the right hand (palmar surface).  SKIN: no suspicious lesions or rashes  PSYCH: mentation appears normal, affect normal/bright  LYMPH: normal ant/post cervical, supraclavicular nodes        3/25/2024   Mini Cog   Clock Draw Score 2 Normal   3 Item Recall 3 objects recalled   Mini Cog Total Score 5         Results for orders placed or performed in visit on 03/25/24   Comprehensive Metabolic Panel     Status: Normal   Result Value Ref Range    Sodium 137 135 - 145 mmol/L    Potassium 4.2 3.4 - 5.3 mmol/L    Carbon Dioxide (CO2) 27 22 - 29 mmol/L    Anion Gap 12 7 - 15 mmol/L    Urea Nitrogen 12.0 8.0 - 23.0 mg/dL    Creatinine 0.88 0.67 - 1.17 mg/dL    GFR " Estimate >90 >60 mL/min/1.73m2    Calcium 10.2 8.8 - 10.2 mg/dL    Chloride 98 98 - 107 mmol/L    Glucose 81 70 - 99 mg/dL    Alkaline Phosphatase 60 40 - 150 U/L    AST 28 0 - 45 U/L    ALT 36 0 - 70 U/L    Protein Total 7.4 6.4 - 8.3 g/dL    Albumin 4.7 3.5 - 5.2 g/dL    Bilirubin Total 0.9 <=1.2 mg/dL   Lipid Panel     Status: Abnormal   Result Value Ref Range    Cholesterol 173 <200 mg/dL    Triglycerides 147 <150 mg/dL    Direct Measure HDL 43 >=40 mg/dL    LDL Cholesterol Calculated 101 (H) <=100 mg/dL    Non HDL Cholesterol 130 (H) <130 mg/dL    Patient Fasting > 8hrs? Unknown     Narrative    Cholesterol  Desirable:  <200 mg/dL    Triglycerides  Normal:  Less than 150 mg/dL  Borderline High:  150-199 mg/dL  High:  200-499 mg/dL  Very High:  Greater than or equal to 500 mg/dL    Direct Measure HDL  Female:  Greater than or equal to 50 mg/dL   Male:  Greater than or equal to 40 mg/dL    LDL Cholesterol  Desirable:  <100mg/dL  Above Desirable:  100-129 mg/dL   Borderline High:  130-159 mg/dL   High:  160-189 mg/dL   Very High:  >= 190 mg/dL    Non HDL Cholesterol  Desirable:  130 mg/dL  Above Desirable:  130-159 mg/dL  Borderline High:  160-189 mg/dL  High:  190-219 mg/dL  Very High:  Greater than or equal to 220 mg/dL   CBC with platelets and differential     Status: None   Result Value Ref Range    WBC Count 9.8 4.0 - 11.0 10e3/uL    RBC Count 4.74 4.40 - 5.90 10e6/uL    Hemoglobin 15.2 13.3 - 17.7 g/dL    Hematocrit 43.8 40.0 - 53.0 %    MCV 92 78 - 100 fL    MCH 32.1 26.5 - 33.0 pg    MCHC 34.7 31.5 - 36.5 g/dL    RDW 12.7 10.0 - 15.0 %    Platelet Count 172 150 - 450 10e3/uL    % Neutrophils 57 %    % Lymphocytes 29 %    % Monocytes 9 %    % Eosinophils 5 %    % Basophils 1 %    % Immature Granulocytes 0 %    NRBCs per 100 WBC 0 <1 /100    Absolute Neutrophils 5.6 1.6 - 8.3 10e3/uL    Absolute Lymphocytes 2.8 0.8 - 5.3 10e3/uL    Absolute Monocytes 0.9 0.0 - 1.3 10e3/uL    Absolute Eosinophils 0.5 0.0 -  0.7 10e3/uL    Absolute Basophils 0.1 0.0 - 0.2 10e3/uL    Absolute Immature Granulocytes 0.0 <=0.4 10e3/uL    Absolute NRBCs 0.0 10e3/uL   CBC and Differential     Status: None    Narrative    The following orders were created for panel order CBC and Differential.  Procedure                               Abnormality         Status                     ---------                               -----------         ------                     CBC with platelets and d...[[id number]]                      Final result                 Please view results for these tests on the individual orders.     Signed Electronically by: Magalis Fajardo PA-C    Answers submitted by the patient for this visit:  Patient Health Questionnaire (Submitted on 3/25/2024)  If you checked off any problems, how difficult have these problems made it for you to do your work, take care of things at home, or get along with other people?: Not difficult at all  PHQ9 TOTAL SCORE: 3  MINI-7 (Submitted on 3/25/2024)  MINI 7 TOTAL SCORE: 6

## 2024-03-25 NOTE — NURSING NOTE
"Patient presents to the clinic for medicare wellness.    FOOD SECURITY SCREENING QUESTIONS:    The next two questions are to help us understand your food security.  If you are feeling you need any assistance in this area, we have resources available to support you today.    Hunger Vital Signs:  Within the past 12 months we worried whether our food would run out before we got money to buy more. Never  Within the past 12 months the food we bought just didn't last and we didn't have money to get more. Never    Advance Care Directive on file? no  Advance Care Directive provided to patient? Declined.      Chief Complaint   Patient presents with    Medicare Visit     wellness       Initial /62 (BP Location: Left arm, Patient Position: Sitting, Cuff Size: Adult Regular)   Pulse 68   Temp 97.6  F (36.4  C) (Tympanic)   Resp 20   Ht 1.753 m (5' 9\")   Wt 74.4 kg (164 lb)   SpO2 95%   BMI 24.22 kg/m   Estimated body mass index is 24.22 kg/m  as calculated from the following:    Height as of this encounter: 1.753 m (5' 9\").    Weight as of this encounter: 74.4 kg (164 lb).  Medication Reconciliation: complete        Mary Barney LPN     "

## 2024-03-25 NOTE — PATIENT INSTRUCTIONS
Preventive Care Advice   This is general advice given by our system to help you stay healthy. However, your care team may have specific advice just for you. Please talk to your care team about your preventive care needs.  Nutrition  Eat 5 or more servings of fruits and vegetables each day.  Try wheat bread, brown rice and whole grain pasta (instead of white bread, rice, and pasta).  Get enough calcium and vitamin D. Check the label on foods and aim for 100% of the RDA (recommended daily allowance).  Lifestyle  Exercise at least 150 minutes each week   (30 minutes a day, 5 days a week).  Do muscle strengthening activities 2 days a week. These help control your weight and prevent disease.  No smoking.  Wear sunscreen to prevent skin cancer.  Have a dental exam and cleaning every 6 months.  Yearly exams  See your health care team every year to talk about:  Any changes in your health.  Any medicines your care team has prescribed.  Preventive care, family planning, and ways to prevent chronic diseases.  Shots (vaccines)   HPV shots (up to age 26), if you've never had them before.  Hepatitis B shots (up to age 59), if you've never had them before.  COVID-19 shot: Get this shot when it's due.  Flu shot: Get a flu shot every year.  Tetanus shot: Get a tetanus shot every 10 years.  Pneumococcal, hepatitis A, and RSV shots: Ask your care team if you need these based on your risk.  Shingles shot (for age 50 and up).  General health tests  Diabetes screening:  Starting at age 35, Get screened for diabetes at least every 3 years.  If you are younger than age 35, ask your care team if you should be screened for diabetes.  Cholesterol test: At age 39, start having a cholesterol test every 5 years, or more often if advised.  Bone density scan (DEXA): At age 50, ask your care team if you should have this scan for osteoporosis (brittle bones).  Hepatitis C: Get tested at least once in your life.  STIs (sexually transmitted  infections)  Before age 24: Ask your care team if you should be screened for STIs.  After age 24: Get screened for STIs if you're at risk. You are at risk for STIs (including HIV) if:  You are sexually active with more than one person.  You don't use condoms every time.  You or a partner was diagnosed with a sexually transmitted infection.  If you are at risk for HIV, ask about PrEP medicine to prevent HIV.  Get tested for HIV at least once in your life, whether you are at risk for HIV or not.  Cancer screening tests  Cervical cancer screening: If you have a cervix, begin getting regular cervical cancer screening tests at age 21. Most people who have regular screenings with normal results can stop after age 65. Talk about this with your provider.  Breast cancer scan (mammogram): If you've ever had breasts, begin having regular mammograms starting at age 40. This is a scan to check for breast cancer.  Colon cancer screening: It is important to start screening for colon cancer at age 45.  Have a colonoscopy test every 10 years (or more often if you're at risk) Or, ask your provider about stool tests like a FIT test every year or Cologuard test every 3 years.  To learn more about your testing options, visit: https://www.[employer]/603717.pdf.  For help making a decision, visit: https://bit.ly/bd10799.  Prostate cancer screening test: If you have a prostate and are age 55 to 69, ask your provider if you would benefit from a yearly prostate cancer screening test.  Lung cancer screening: If you are a current or former smoker age 50 to 80, ask your care team if ongoing lung cancer screenings are right for you.  For informational purposes only. Not to replace the advice of your health care provider. Copyright   2023 [employer]. All rights reserved. Clinically reviewed by the [facility]. [employer] 997791 - REV 01/24.    Learning About Stress  What is stress?     Stress is your  body's response to a hard situation. Your body can have a physical, emotional, or mental response. Stress is a fact of life for most people, and it affects everyone differently. What causes stress for you may not be stressful for someone else.  A lot of things can cause stress. You may feel stress when you go on a job interview, take a test, or run a race. This kind of short-term stress is normal and even useful. It can help you if you need to work hard or react quickly. For example, stress can help you finish an important job on time.  Long-term stress is caused by ongoing stressful situations or events. Examples of long-term stress include long-term health problems, ongoing problems at work, or conflicts in your family. Long-term stress can harm your health.  How does stress affect your health?  When you are stressed, your body responds as though you are in danger. It makes hormones that speed up your heart, make you breathe faster, and give you a burst of energy. This is called the fight-or-flight stress response. If the stress is over quickly, your body goes back to normal and no harm is done.  But if stress happens too often or lasts too long, it can have bad effects. Long-term stress can make you more likely to get sick, and it can make symptoms of some diseases worse. If you tense up when you are stressed, you may develop neck, shoulder, or low back pain. Stress is linked to high blood pressure and heart disease.  Stress also harms your emotional health. It can make you gold, tense, or depressed. Your relationships may suffer, and you may not do well at work or school.  What can you do to manage stress?  You can try these things to help manage stress:   Do something active. Exercise or activity can help reduce stress. Walking is a great way to get started. Even everyday activities such as housecleaning or yard work can help.  Try yoga or alicia chi. These techniques combine exercise and meditation. You may need  some training at first to learn them.  Do something you enjoy. For example, listen to music or go to a movie. Practice your hobby or do volunteer work.  Meditate. This can help you relax, because you are not worrying about what happened before or what may happen in the future.  Do guided imagery. Imagine yourself in any setting that helps you feel calm. You can use online videos, books, or a teacher to guide you.  Do breathing exercises. For example:  From a standing position, bend forward from the waist with your knees slightly bent. Let your arms dangle close to the floor.  Breathe in slowly and deeply as you return to a standing position. Roll up slowly and lift your head last.  Hold your breath for just a few seconds in the standing position.  Breathe out slowly and bend forward from the waist.  Let your feelings out. Talk, laugh, cry, and express anger when you need to. Talking with supportive friends or family, a counselor, or a radha leader about your feelings is a healthy way to relieve stress. Avoid discussing your feelings with people who make you feel worse.  Write. It may help to write about things that are bothering you. This helps you find out how much stress you feel and what is causing it. When you know this, you can find better ways to cope.  What can you do to prevent stress?  You might try some of these things to help prevent stress:  Manage your time. This helps you find time to do the things you want and need to do.  Get enough sleep. Your body recovers from the stresses of the day while you are sleeping.  Get support. Your family, friends, and community can make a difference in how you experience stress.  Limit your news feed. Avoid or limit time on social media or news that may make you feel stressed.  Do something active. Exercise or activity can help reduce stress. Walking is a great way to get started.  Where can you learn more?  Go to https://www.healthwise.net/patiented  Enter N032 in the  "search box to learn more about \"Learning About Stress.\"  Current as of: October 24, 2023               Content Version: 14.0    4892-9579 [employer].   Care instructions adapted under license by your healthcare professional. If you have questions about a medical condition or this instruction, always ask your healthcare professional. [employer] disclaims any warranty or liability for your use of this information.      Learning About Sleeping Well  What does sleeping well mean?     Sleeping well means getting enough sleep to feel good and stay healthy. How much sleep is enough varies among people.  The number of hours you sleep and how you feel when you wake up are both important. If you do not feel refreshed, you probably need more sleep. Another sign of not getting enough sleep is feeling tired during the day.  Experts recommend that adults get at least 7 or more hours of sleep per day. Children and older adults need more sleep.  Why is getting enough sleep important?  Getting enough quality sleep is a basic part of good health. When your sleep suffers, your physical health, mood, and your thoughts can suffer too. You may find yourself feeling more grumpy or stressed. Not getting enough sleep also can lead to serious problems, including injury, accidents, anxiety, and depression.  What might cause poor sleeping?  Many things can cause sleep problems, including:  Changes to your sleep schedule.  Stress. Stress can be caused by fear about a single event, such as giving a speech. Or you may have ongoing stress, such as worry about work or school.  Depression, anxiety, and other mental or emotional conditions.  Changes in your sleep habits or surroundings. This includes changes that happen where you sleep, such as noise, light, or sleeping in a different bed. It also includes changes in your sleep pattern, such as having jet lag or working a late shift.  Health problems, such as pain, " "breathing problems, and restless legs syndrome.  Lack of regular exercise.  Using alcohol, nicotine, or caffeine before bed.  How can you help yourself?  Here are some tips that may help you sleep more soundly and wake up feeling more refreshed.  Your sleeping area   Use your bedroom only for sleeping and sex. A bit of light reading may help you fall asleep. But if it doesn't, do your reading elsewhere in the house. Try not to use your TV, computer, smartphone, or tablet while you are in bed.  Be sure your bed is big enough to stretch out comfortably, especially if you have a sleep partner.  Keep your bedroom quiet, dark, and cool. Use curtains, blinds, or a sleep mask to block out light. To block out noise, use earplugs, soothing music, or a \"white noise\" machine.  Your evening and bedtime routine   Create a relaxing bedtime routine. You might want to take a warm shower or bath, or listen to soothing music.  Go to bed at the same time every night. And get up at the same time every morning, even if you feel tired.  What to avoid   Limit caffeine (coffee, tea, caffeinated sodas) during the day, and don't have any for at least 6 hours before bedtime.  Avoid drinking alcohol before bedtime. Alcohol can cause you to wake up more often during the night.  Try not to smoke or use tobacco, especially in the evening. Nicotine can keep you awake.  Limit naps during the day, especially close to bedtime.  Avoid lying in bed awake for too long. If you can't fall asleep or if you wake up in the middle of the night and can't get back to sleep within about 20 minutes, get out of bed and go to another room until you feel sleepy.  Avoid taking medicine right before bed that may keep you awake or make you feel hyper or energized. Your doctor can tell you if your medicine may do this and if you can take it earlier in the day.  If you can't sleep   Imagine yourself in a peaceful, pleasant scene. Focus on the details and feelings of " "being in a place that is relaxing.  Get up and do a quiet or boring activity until you feel sleepy.  Avoid drinking any liquids before going to bed to help prevent waking up often to use the bathroom.  Where can you learn more?  Go to https://www.[employer].net/patiented  Enter J942 in the search box to learn more about \"Learning About Sleeping Well.\"  Current as of: July 10, 2023               Content Version: 14.0    4003-8221 [employer].   Care instructions adapted under license by your healthcare professional. If you have questions about a medical condition or this instruction, always ask your healthcare professional. [employer] disclaims any warranty or liability for your use of this information.      Lung Cancer Screening   Frequently Asked Questions  If you are at high-risk for lung cancer, getting screened with low-dose computed tomography (LDCT) every year can help save your life. This handout offers answers to some of the most common questions about lung cancer screening. If you have other questions, please call [phone] ([phone]).     What is it?  Lung cancer screening uses special X-ray technology to create an image of your lung tissue. The exam is quick and easy and takes less than 10 seconds. We don t give you any medicine or use any needles. You can eat before and after the exam. You don t need to change your clothes as long as the clothing on your chest doesn t contain metal. But, you do need to be able to hold your breath for at least 6 seconds during the exam.    What is the goal of lung cancer screening?  The goal of lung cancer screening is to save lives. Many times, lung cancer is not found until a person starts having physical symptoms. Lung cancer screening can help detect lung cancer in the earliest stages when it may be easier to treat.    Who should be screened for lung cancer?  We suggest lung cancer screening for anyone who is at high-risk for " lung cancer. You are in the high-risk group if you:      are between the ages of 55 and 79, and    have smoked at least 1 pack of cigarettes a day for 20 or more years, and    still smoke or have quit within the past 15 years.    However, if you have a new cough or shortness of breath, you should talk to your doctor before being screened.    Why does it matter if I have symptoms?  Certain symptoms can be a sign that you have a condition in your lungs that should be checked and treated by your doctor. These symptoms include fever, chest pain, a new or changing cough, shortness of breath that you have never felt before, coughing up blood or unexplained weight loss. Having any of these symptoms can greatly affect the results of lung cancer screening.       Should all smokers get an LDCT lung cancer screening exam?  It depends. Lung cancer screening is for a very specific group of men and women who have a history of heavy smoking over a long period of time (see  Who should be screened for lung cancer  above).  I am in the high-risk group, but have been diagnosed with cancer in the past. Is LDCT lung cancer screening right for me?  In some cases, you should not have LDCT lung screening, such as when your doctor is already following your cancer with CT scan studies. Your doctor will help you decide if LDCT lung screening is right for you.  Do I need to have a screening exam every year?  Yes. If you are in the high-risk group described earlier, you should get an LDCT lung cancer screening exam every year until you are 79, or are no longer willing or able to undergo screening and possible procedures to diagnose and treat lung cancer.  How effective is LDCT at preventing death from lung cancer?  Studies have shown that LDCT lung cancer screening can lower the risk of death from lung cancer by 20 percent in people who are at high-risk.  What are the risks?  There are some risks and limitations of LDCT lung cancer screening.  We want to make sure you understand the risks and benefits, so please let us know if you have any questions. Your doctor may want to talk with you more about these risks.    Radiation exposure: As with any exam that uses radiation, there is a very small increased risk of cancer. The amount of radiation in LDCT is small--about the same amount a person would get from a mammogram. Your doctor orders the exam when he or she feels the potential benefits outweigh the risks.    False negatives: No test is perfect, including LDCT. It is possible that you may have a medical condition, including lung cancer, that is not found during your exam. This is called a false negative result.    False positives and more testing: LDCT very often finds something in the lung that could be cancer, but in fact is not. This is called a false positive result. False positive tests often cause anxiety. To make sure these findings are not cancer, you may need to have more tests. These tests will be done only if you give us permission. Sometimes patients need a treatment that can have side effects, such as a biopsy. For more information on false positives, see  What can I expect from the results?     Findings not related to lung cancer: Your LDCT exam also takes pictures of areas of your body next to your lungs. In a very small number of cases, the CT scan will show an abnormal finding in one of these areas, such as your kidneys, adrenal glands, liver or thyroid. This finding may not be serious, but you may need more tests. Your doctor can help you decide what other tests you may need, if any.  What can I expect from the results?  About 1 out of 4 LDCT exams will find something that may need more tests. Most of the time, these findings are lung nodules. Lung nodules are very small collections of tissue in the lung. These nodules are very common, and the vast majority--more than 97 percent--are not cancer (benign). Most are normal lymph nodes or  small areas of scarring from past infections.  But, if a small lung nodule is found to be cancer, the cancer can be cured more than 90 percent of the time. To know if the nodule is cancer, we may need to get more images before your next yearly screening exam. If the nodule has suspicious features (for example, it is large, has an odd shape or grows over time), we will refer you to a specialist for further testing.  Will my doctor also get the results?  Yes. Your doctor will get a copy of your results.  Is it okay to keep smoking now that there s a cancer screening exam?  No. Tobacco is one of the strongest cancer-causing agents. It causes not only lung cancer, but other cancers and cardiovascular (heart) diseases as well. The damage caused by smoking builds over time. This means that the longer you smoke, the higher your risk of disease. While it is never too late to quit, the sooner you quit, the better.  Where can I find help to quit smoking?  The best way to prevent lung cancer is to stop smoking. If you have already quit smoking, congratulations and keep it up! For help on quitting smoking, please call [facility] at 1-313-QUIT-NOW (1-612.448.3200) or the American [employer] Society at 1-876.149.5171 to find local resources near you.  One-on-one health coaching:  If you d prefer to work individually with a health care provider on tobacco cessation, we offer:      Medication Therapy Management:  Our specially trained pharmacists work closely with you and your doctor to help you quit smoking.  Call [phone] or [phone] (toll free).

## 2024-03-25 NOTE — LETTER
"March 25, 2024      Josué Álvarez  [address] [facility] [id number]        Dear ,    We are writing to inform you of your test results.    CMP:  - Electrolytes: electrolytes (sodium, potassium, glucose, chloride, calcium) are normal  - Kidney function (GFR, creatinine) are normal  - Liver function (Alkaline phosphatase, AST, ALT) are normal    CBC:  - WBC, RBC, hemoglobin, hematocrit. Blood counts are normal.    Lipid:  - Cholesterol, HDL (\"good cholesterol\") and triglycerides are normal. LDL (\"bad cholesterol\") is just 1 point above goal, this is still good.     Resulted Orders   Comprehensive Metabolic Panel   Result Value Ref Range    Sodium 137 135 - 145 mmol/L      Comment:      Reference intervals for this test were updated on 09/26/2023 to more accurately reflect our healthy population. There may be differences in the flagging of prior results with similar values performed with this method. Interpretation of those prior results can be made in the context of the updated reference intervals.     Potassium 4.2 3.4 - 5.3 mmol/L    Carbon Dioxide (CO2) 27 22 - 29 mmol/L    Anion Gap 12 7 - 15 mmol/L    Urea Nitrogen 12.0 8.0 - 23.0 mg/dL    Creatinine 0.88 0.67 - 1.17 mg/dL    GFR Estimate >90 >60 mL/min/1.73m2    Calcium 10.2 8.8 - 10.2 mg/dL    Chloride 98 98 - 107 mmol/L    Glucose 81 70 - 99 mg/dL    Alkaline Phosphatase 60 40 - 150 U/L      Comment:      Reference intervals for this test were updated on 11/14/2023 to more accurately reflect our healthy population. There may be differences in the flagging of prior results with similar values performed with this method. Interpretation of those prior results can be made in the context of the updated reference intervals.    AST 28 0 - 45 U/L      Comment:      Reference intervals for this test were updated on 6/12/2023 to more accurately reflect our healthy population. There may be differences in the flagging of prior results with similar values " performed with this method. Interpretation of those prior results can be made in the context of the updated reference intervals.    ALT 36 0 - 70 U/L      Comment:      Reference intervals for this test were updated on 6/12/2023 to more accurately reflect our healthy population. There may be differences in the flagging of prior results with similar values performed with this method. Interpretation of those prior results can be made in the context of the updated reference intervals.      Protein Total 7.4 6.4 - 8.3 g/dL    Albumin 4.7 3.5 - 5.2 g/dL    Bilirubin Total 0.9 <=1.2 mg/dL   Lipid Panel   Result Value Ref Range    Cholesterol 173 <200 mg/dL    Triglycerides 147 <150 mg/dL    Direct Measure HDL 43 >=40 mg/dL    LDL Cholesterol Calculated 101 (H) <=100 mg/dL    Non HDL Cholesterol 130 (H) <130 mg/dL    Patient Fasting > 8hrs? Unknown     Narrative    Cholesterol  Desirable:  <200 mg/dL    Triglycerides  Normal:  Less than 150 mg/dL  Borderline High:  150-199 mg/dL  High:  200-499 mg/dL  Very High:  Greater than or equal to 500 mg/dL    Direct Measure HDL  Female:  Greater than or equal to 50 mg/dL   Male:  Greater than or equal to 40 mg/dL    LDL Cholesterol  Desirable:  <100mg/dL  Above Desirable:  100-129 mg/dL   Borderline High:  130-159 mg/dL   High:  160-189 mg/dL   Very High:  >= 190 mg/dL    Non HDL Cholesterol  Desirable:  130 mg/dL  Above Desirable:  130-159 mg/dL  Borderline High:  160-189 mg/dL  High:  190-219 mg/dL  Very High:  Greater than or equal to 220 mg/dL   CBC with platelets and differential   Result Value Ref Range    WBC Count 9.8 4.0 - 11.0 10e3/uL    RBC Count 4.74 4.40 - 5.90 10e6/uL    Hemoglobin 15.2 13.3 - 17.7 g/dL    Hematocrit 43.8 40.0 - 53.0 %    MCV 92 78 - 100 fL    MCH 32.1 26.5 - 33.0 pg    MCHC 34.7 31.5 - 36.5 g/dL    RDW 12.7 10.0 - 15.0 %    Platelet Count 172 150 - 450 10e3/uL    % Neutrophils 57 %    % Lymphocytes 29 %    % Monocytes 9 %    % Eosinophils 5 %    %  Basophils 1 %    % Immature Granulocytes 0 %    NRBCs per 100 WBC 0 <1 /100    Absolute Neutrophils 5.6 1.6 - 8.3 10e3/uL    Absolute Lymphocytes 2.8 0.8 - 5.3 10e3/uL    Absolute Monocytes 0.9 0.0 - 1.3 10e3/uL    Absolute Eosinophils 0.5 0.0 - 0.7 10e3/uL    Absolute Basophils 0.1 0.0 - 0.2 10e3/uL    Absolute Immature Granulocytes 0.0 <=0.4 10e3/uL    Absolute NRBCs 0.0 10e3/uL       If you have any questions or concerns, please call the clinic at the number listed above.       Sincerely,      Magalis Fajardo PA-C

## 2024-03-25 NOTE — PROGRESS NOTES
Lung Cancer Screening Shared Decision Making Visit     Josué Álvarez, a 68 year old male, is eligible for lung cancer screening    History   Smoking Status    Former    Packs/day: 0.50    Years: 25.00    Types: Cigarettes    Quit date: 10/30/2014   Smokeless Tobacco    Former    Quit date: 2018       I have discussed with patient the risks and benefits of screening for lung cancer with low-dose CT.     The risks include:    radiation exposure: one low dose chest CT has as much ionizing radiation as about 15 chest x-rays, or 6 months of background radiation living in [place]      false positives: most findings/nodules are NOT cancer, but some might still require additional diagnostic evaluation, including biopsy    over-diagnosis: some slow growing cancers that might never have been clinically significant will be detected and treated unnecessarily     The benefit of early detection of lung cancer is contingent upon adherence to annual screening or more frequent follow up if indicated.     Furthermore, to benefit from screening, Josué must be willing and able to undergo diagnostic procedures, if indicated. Although no specific guide is available for determining severity of comorbidities, it is reasonable to withhold screening in patients who have greater mortality risk from other diseases.     We did discuss that the best way to prevent lung cancer is to not smoke.    Some patients may value a numeric estimation of lung cancer risk when evaluating if lung cancer screening is right for them, here is one calculator:    ShouldIScreen  Answers submitted by the patient for this visit:  Patient Health Questionnaire (Submitted on 3/25/2024)  If you checked off any problems, how difficult have these problems made it for you to do your work, take care of things at home, or get along with other people?: Not difficult at all  PHQ9 TOTAL SCORE: 3  MINI-7 (Submitted on 3/25/2024)  MINI 7 TOTAL SCORE: 6

## 2024-03-25 NOTE — COMMUNITY RESOURCES LIST (ENGLISH)
March 25, 2024           YOUR PERSONALIZED LIST OF SERVICES & PROGRAMS           & SHELTER    Housing      House [facility] for [facility]  [address] [place] [address] (Distance: 1.0 miles)  Phone: [phone]  Website: https://www.[employer]/  Language: English  Fee: Free  Accessibility: Ada accessible      House [facility] for families  [address] [place] [address] (Distance: 1.0 miles)  Phone: [phone]  Website: https://www.[employer]/  Language: English  Fee: Free  Accessibility: Ada accessible      [facility] - Housing Stabilization Services  Phone: (529) 374-4568  Website: https://[employer]/Housing-Stabilization.html  Language: English  Hours: Mon 9:00 AM - 5:00 PM Tue 9:00 AM - 5:00 PM Wed 9:00 AM - 5:00 PM Thu 9:00 AM - 5:00 PM Fri 9:00 AM - 5:00 PM  Fee: Insurance  Accessibility: Deaf or hard of hearing, Translation services    Case Management      [employer] - Housing search assistance  [address] [place] [address] (Distance: 23.7 miles)  Language: English  Fee: Free      [employer] - Housing Stabilization Services  Phone: (479) 674-2152  Website: https://homebasemn.com/  Language: English  Hours: Mon 8:00 AM - 4:00 PM Tue 8:00 AM - 4:00 PM Wed 8:00 AM - 4:00 PM Thu 8:00 AM - 4:00 PM Fri 8:00 AM - 4:00 PM  Fee: Free  Accessibility: Blind accommodation, Deaf or hard of hearing  Transportation Options: Free transportation    Drop-In Services      [employer]UN[employer]  Website: http://www.laundrylove.org               IMPORTANT NUMBERS & WEBSITES        Emergency Services  911  .   United Way  211 http://211unitedway.org  .   Poison Control  (618) 192-5734 http://[place]poison.org http://[place]poison.org  .     Suicide and Crisis Lifeline  988 http://988lifeline.org  .   Childhelp [place] Child Abuse Hotline  991.159.9375 http://Childhelphotline.org   .   National  Sexual Assault Hotline  (476) 288-6564 (HOPE) http://Learning [employer]driven.Cervalis   .     National Runaway Safeline  (104) 445-3445 (RUNAWAY) http://[employer] Magics.Cervalis  .   Pregnancy & Postpartum Support  Call/text 717-116-9243  [place]: http://ppsupport[place].org  [place]: http://psichapters.com/wi  .   Substance Abuse National Helpline ([facility])  171-339-HELP (5151) http://Findtreatment.gov   .                DISCLAIMER: [employer] does not endorse any service providers mentioned in this resource list. [employer] does not guarantee that the services mentioned in this resource list will be available to you or will improve your health or wellness.    [facility]

## 2024-03-26 ENCOUNTER — TELEPHONE (OUTPATIENT)
Dept: FAMILY MEDICINE | Facility: OTHER | Age: 69
End: 2024-03-26
Payer: MEDICARE

## 2024-03-26 NOTE — TELEPHONE ENCOUNTER
Returned call to patient. LVM that a result letter was in the mail and to call if any questions once her get the results.  Norma J. Gosselin, LPN .......  3/26/2024  3:09 PM

## 2024-04-10 ENCOUNTER — TELEPHONE (OUTPATIENT)
Dept: FAMILY MEDICINE | Facility: OTHER | Age: 69
End: 2024-04-10
Payer: MEDICARE

## 2024-04-10 ENCOUNTER — HOSPITAL ENCOUNTER (OUTPATIENT)
Dept: CT IMAGING | Facility: OTHER | Age: 69
Discharge: HOME | End: 2024-04-10
Attending: PHYSICIAN ASSISTANT | Admitting: PHYSICIAN ASSISTANT
Payer: MEDICARE

## 2024-04-10 DIAGNOSIS — Z87.891 PERSONAL HISTORY OF TOBACCO USE: ICD-10-CM

## 2024-04-10 PROCEDURE — 71271 CT THORAX LUNG CANCER SCR C-: CPT

## 2024-04-10 NOTE — TELEPHONE ENCOUNTER
CLL-patient stated that he was returning a call was not sure who called and for what reason    Shelia Gordon on 4/10/2024 at 3:28 PM

## 2024-04-10 NOTE — TELEPHONE ENCOUNTER
There is mild emphysema.  No concerning lung nodules or infection noted per radiology.    Magalis Fajardo PA-C  4/10/2024  4:51 PM

## 2024-04-10 NOTE — TELEPHONE ENCOUNTER
Patient was notified of providers note in CT Chest Lung Cancer Screening.    In previous imaging, patient was informed that there was a small lung infection, slight emphysema and calcification of the arteries observed.  Patient is wondering if this is still there?      Mary Barney LPN 4/10/2024 4:08 PM

## 2024-04-11 NOTE — TELEPHONE ENCOUNTER
[address] [facility] coding opportunities       Chart reviewed, no opportunity found: CHART REVIEWED, NO OPPORTUNITY FOUND        Patients Insurance     Medicare Insurance: Medicare Patient informed of the providers note. Patient has no further questions or concerns and will reach out if patient has any.  Anette Arteaga LPN

## 2024-04-25 ENCOUNTER — DOCUMENTATION ONLY (OUTPATIENT)
Dept: OTHER | Facility: CLINIC | Age: 69
End: 2024-04-25
Payer: MEDICARE

## 2024-05-13 DIAGNOSIS — J30.1 ALLERGIC RHINITIS DUE TO POLLEN, UNSPECIFIED SEASONALITY: ICD-10-CM

## 2024-05-15 RX ORDER — FLUTICASONE PROPIONATE 50 MCG
1 SPRAY, SUSPENSION (ML) NASAL DAILY
Qty: 16 ML | Refills: 4 | Status: SHIPPED | OUTPATIENT
Start: 2024-05-15

## 2024-07-11 DIAGNOSIS — M54.2 NECK PAIN: ICD-10-CM

## 2024-07-11 RX ORDER — GABAPENTIN 300 MG/1
300 CAPSULE ORAL 2 TIMES DAILY PRN
Qty: 180 CAPSULE | Refills: 3 | Status: SHIPPED | OUTPATIENT
Start: 2024-07-11

## 2024-07-11 NOTE — TELEPHONE ENCOUNTER
[facility] sent Rx request for the following:      Requested Prescriptions   Pending Prescriptions Disp Refills    gabapentin (NEURONTIN) 300 MG capsule 180 capsule 1     Sig: Take 1 capsule (300 mg) by mouth 2 times daily as needed (neck pain)       There is no refill protocol information for this order          Last Prescription Date:   3/4/24  Last Fill Qty/Refills:         180, R-1    Last Office Visit:              3/25/24   Future Office visit:            none     Magalis Tovar RN on 7/11/2024 at 1:52 PM

## 2024-11-25 DIAGNOSIS — J30.1 ALLERGIC RHINITIS DUE TO POLLEN, UNSPECIFIED SEASONALITY: ICD-10-CM

## 2024-11-26 RX ORDER — FLUTICASONE PROPIONATE 50 MCG
1 SPRAY, SUSPENSION (ML) NASAL DAILY
Qty: 16 ML | Refills: 4 | Status: SHIPPED | OUTPATIENT
Start: 2024-11-26

## 2024-12-23 DIAGNOSIS — N52.9 ERECTILE DYSFUNCTION, UNSPECIFIED ERECTILE DYSFUNCTION TYPE: ICD-10-CM

## 2024-12-23 RX ORDER — SILDENAFIL CITRATE 20 MG/1
TABLET ORAL
Qty: 30 TABLET | Refills: 11 | Status: SHIPPED | OUTPATIENT
Start: 2024-12-23

## 2025-03-25 ENCOUNTER — TELEPHONE (OUTPATIENT)
Dept: FAMILY MEDICINE | Facility: OTHER | Age: 70
End: 2025-03-25
Payer: MEDICARE

## 2025-03-25 DIAGNOSIS — Z87.891 PERSONAL HISTORY OF TOBACCO USE: ICD-10-CM

## 2025-03-25 DIAGNOSIS — Z12.2 SCREENING FOR LUNG CANCER: ICD-10-CM

## 2025-03-25 DIAGNOSIS — Z00.00 ENCOUNTER FOR MEDICARE ANNUAL WELLNESS EXAM: Primary | ICD-10-CM

## 2025-03-25 NOTE — TELEPHONE ENCOUNTER
Lung Cancer Screening Shared Decision Making Visit     Josué Álvarez, a 69 year old male, is eligible for lung cancer screening    History   Smoking Status     Former     Packs/day: 0.50     Years: 25.00     Types: Cigarettes     Quit date: 10/30/2014   Smokeless Tobacco     Former     Quit date: 2018       I have discussed with patient the risks and benefits of screening for lung cancer with low-dose CT.     The risks include:    radiation exposure: one low dose chest CT has as much ionizing radiation as about 15 chest x-rays, or 6 months of background radiation living in [place]      false positives: most findings/nodules are NOT cancer, but some might still require additional diagnostic evaluation, including biopsy    over-diagnosis: some slow growing cancers that might never have been clinically significant will be detected and treated unnecessarily     The benefit of early detection of lung cancer is contingent upon adherence to annual screening or more frequent follow up if indicated.     Furthermore, to benefit from screening, Josué must be willing and able to undergo diagnostic procedures, if indicated. Although no specific guide is available for determining severity of comorbidities, it is reasonable to withhold screening in patients who have greater mortality risk from other diseases.     We did discuss that the best way to prevent lung cancer is to not smoke.    Some patients may value a numeric estimation of lung cancer risk when evaluating if lung cancer screening is right for them, here is one calculator:    ShouldIScreen

## 2025-03-25 NOTE — PATIENT INSTRUCTIONS
Lung Cancer Screening   Frequently Asked Questions  If you are at high-risk for lung cancer, getting screened with low-dose computed tomography (LDCT) every year can help save your life. This handout offers answers to some of the most common questions about lung cancer screening. If you have other questions, please call [phone] ([phone]).     What is it?  Lung cancer screening uses special X-ray technology to create an image of your lung tissue. The exam is quick and easy and takes less than 10 seconds. We don t give you any medicine or use any needles. You can eat before and after the exam. You don t need to change your clothes as long as the clothing on your chest doesn t contain metal. But, you do need to be able to hold your breath for at least 6 seconds during the exam.    What is the goal of lung cancer screening?  The goal of lung cancer screening is to save lives. Many times, lung cancer is not found until a person starts having physical symptoms. Lung cancer screening can help detect lung cancer in the earliest stages when it may be easier to treat.    Who should be screened for lung cancer?  We suggest lung cancer screening for anyone who is at high-risk for lung cancer. You are in the high-risk group if you:      are between the ages of 55 and 79, and    have smoked at least 1 pack of cigarettes a day for 20 or more years, and    still smoke or have quit within the past 15 years.    However, if you have a new cough or shortness of breath, you should talk to your doctor before being screened.    Why does it matter if I have symptoms?  Certain symptoms can be a sign that you have a condition in your lungs that should be checked and treated by your doctor. These symptoms include fever, chest pain, a new or changing cough, shortness of breath that you have never felt before, coughing up blood or unexplained weight loss. Having any of these symptoms can greatly affect the results of lung  cancer screening.       Should all smokers get an LDCT lung cancer screening exam?  It depends. Lung cancer screening is for a very specific group of men and women who have a history of heavy smoking over a long period of time (see  Who should be screened for lung cancer  above).  I am in the high-risk group, but have been diagnosed with cancer in the past. Is LDCT lung cancer screening right for me?  In some cases, you should not have LDCT lung screening, such as when your doctor is already following your cancer with CT scan studies. Your doctor will help you decide if LDCT lung screening is right for you.  Do I need to have a screening exam every year?  Yes. If you are in the high-risk group described earlier, you should get an LDCT lung cancer screening exam every year until you are 79, or are no longer willing or able to undergo screening and possible procedures to diagnose and treat lung cancer.  How effective is LDCT at preventing death from lung cancer?  Studies have shown that LDCT lung cancer screening can lower the risk of death from lung cancer by 20 percent in people who are at high-risk.  What are the risks?  There are some risks and limitations of LDCT lung cancer screening. We want to make sure you understand the risks and benefits, so please let us know if you have any questions. Your doctor may want to talk with you more about these risks.    Radiation exposure: As with any exam that uses radiation, there is a very small increased risk of cancer. The amount of radiation in LDCT is small--about the same amount a person would get from a mammogram. Your doctor orders the exam when he or she feels the potential benefits outweigh the risks.    False negatives: No test is perfect, including LDCT. It is possible that you may have a medical condition, including lung cancer, that is not found during your exam. This is called a false negative result.    False positives and more testing: LDCT very often finds  something in the lung that could be cancer, but in fact is not. This is called a false positive result. False positive tests often cause anxiety. To make sure these findings are not cancer, you may need to have more tests. These tests will be done only if you give us permission. Sometimes patients need a treatment that can have side effects, such as a biopsy. For more information on false positives, see  What can I expect from the results?     Findings not related to lung cancer: Your LDCT exam also takes pictures of areas of your body next to your lungs. In a very small number of cases, the CT scan will show an abnormal finding in one of these areas, such as your kidneys, adrenal glands, liver or thyroid. This finding may not be serious, but you may need more tests. Your doctor can help you decide what other tests you may need, if any.  What can I expect from the results?  About 1 out of 4 LDCT exams will find something that may need more tests. Most of the time, these findings are lung nodules. Lung nodules are very small collections of tissue in the lung. These nodules are very common, and the vast majority--more than 97 percent--are not cancer (benign). Most are normal lymph nodes or small areas of scarring from past infections.  But, if a small lung nodule is found to be cancer, the cancer can be cured more than 90 percent of the time. To know if the nodule is cancer, we may need to get more images before your next yearly screening exam. If the nodule has suspicious features (for example, it is large, has an odd shape or grows over time), we will refer you to a specialist for further testing.  Will my doctor also get the results?  Yes. Your doctor will get a copy of your results.  Is it okay to keep smoking now that there s a cancer screening exam?  No. Tobacco is one of the strongest cancer-causing agents. It causes not only lung cancer, but other cancers and cardiovascular (heart) diseases as well. The damage  caused by smoking builds over time. This means that the longer you smoke, the higher your risk of disease. While it is never too late to quit, the sooner you quit, the better.  Where can I find help to quit smoking?  The best way to prevent lung cancer is to stop smoking. If you have already quit smoking, congratulations and keep it up! For help on quitting smoking, please call [employer] at 3-385-QUITNOW (1-567.949.1197) or the American Cancer Society at 1-794.999.4619 to find local resources near you.  One-on-one health coaching:  If you d prefer to work individually with a health care provider on tobacco cessation, we offer:      Medication Therapy Management:  Our specially trained pharmacists work closely with you and your doctor to help you quit smoking.  Call [phone] or [phone] (toll free).

## 2025-03-25 NOTE — TELEPHONE ENCOUNTER
Reason for call: Request a CT order.    Order requested for what kind of ? CT Chest Lung Cancer Screening    Who is your PCP? Magalis Bird PA-C.    Preferred method for responding to this message: Telephone Call    Phone number patient can be reached at: Cell number on file:    Telephone Information:   Mobile [phone]       If we cannot reach you directly, may we leave a detailed response at the number you provided?  yes    Patient received a letter stating that it is time for his yearly CT Chest Lung Cancer Screening.     Elaina Andersen on 3/25/2025 at 3:36 PM

## 2025-03-25 NOTE — TELEPHONE ENCOUNTER
Patient would like to get these done some time in April (prior to his next physical). Awilda Stanton RN on 3/25/2025 at 3:52 PM

## 2025-04-01 DIAGNOSIS — K21.00 GASTROESOPHAGEAL REFLUX DISEASE WITH ESOPHAGITIS, UNSPECIFIED WHETHER HEMORRHAGE: ICD-10-CM

## 2025-04-01 DIAGNOSIS — E78.5 HYPERLIPIDEMIA, UNSPECIFIED HYPERLIPIDEMIA TYPE: ICD-10-CM

## 2025-04-01 DIAGNOSIS — F41.9 ANXIETY: ICD-10-CM

## 2025-04-02 RX ORDER — BUSPIRONE HYDROCHLORIDE 15 MG/1
15 TABLET ORAL 2 TIMES DAILY PRN
Qty: 30 TABLET | Refills: 0 | Status: SHIPPED | OUTPATIENT
Start: 2025-04-02

## 2025-04-02 RX ORDER — OMEPRAZOLE 20 MG/1
20 CAPSULE, DELAYED RELEASE ORAL DAILY PRN
Qty: 30 CAPSULE | Refills: 0 | Status: SHIPPED | OUTPATIENT
Start: 2025-04-02

## 2025-04-02 RX ORDER — ATORVASTATIN CALCIUM 20 MG/1
20 TABLET, FILM COATED ORAL AT BEDTIME
Qty: 30 TABLET | Refills: 0 | Status: SHIPPED | OUTPATIENT
Start: 2025-04-02

## 2025-04-02 NOTE — TELEPHONE ENCOUNTER
Refilled x30 days. Needs to be seen for additional refills beyond this.     Magalis Bird PA-C  4/2/2025  7:35 AM

## 2025-04-21 ENCOUNTER — HOSPITAL ENCOUNTER (OUTPATIENT)
Dept: CT IMAGING | Facility: OTHER | Age: 70
Discharge: HOME | End: 2025-04-21
Attending: PHYSICIAN ASSISTANT
Payer: MEDICARE

## 2025-04-21 ENCOUNTER — LAB (OUTPATIENT)
Dept: LAB | Facility: OTHER | Age: 70
End: 2025-04-21
Attending: PHYSICIAN ASSISTANT
Payer: MEDICARE

## 2025-04-21 DIAGNOSIS — Z00.00 ENCOUNTER FOR MEDICARE ANNUAL WELLNESS EXAM: ICD-10-CM

## 2025-04-21 DIAGNOSIS — Z87.891 PERSONAL HISTORY OF TOBACCO USE, PRESENTING HAZARDS TO HEALTH: Primary | ICD-10-CM

## 2025-04-21 DIAGNOSIS — Z87.891 PERSONAL HISTORY OF TOBACCO USE: ICD-10-CM

## 2025-04-21 LAB
ALBUMIN SERPL BCG-MCNC: 4.6 G/DL (ref 3.5–5.2)
ALP SERPL-CCNC: 62 U/L (ref 40–150)
ALT SERPL W P-5'-P-CCNC: 24 U/L (ref 0–70)
ANION GAP SERPL CALCULATED.3IONS-SCNC: 11 MMOL/L (ref 7–15)
AST SERPL W P-5'-P-CCNC: 20 U/L (ref 0–45)
BASOPHILS # BLD AUTO: 0 10E3/UL (ref 0–0.2)
BASOPHILS NFR BLD AUTO: 1 %
BILIRUB SERPL-MCNC: 1 MG/DL
BUN SERPL-MCNC: 15.2 MG/DL (ref 8–23)
CALCIUM SERPL-MCNC: 10.2 MG/DL (ref 8.8–10.4)
CHLORIDE SERPL-SCNC: 103 MMOL/L (ref 98–107)
CHOLEST SERPL-MCNC: 175 MG/DL
CREAT SERPL-MCNC: 0.81 MG/DL (ref 0.67–1.17)
EGFRCR SERPLBLD CKD-EPI 2021: >90 ML/MIN/1.73M2
EOSINOPHIL # BLD AUTO: 0.4 10E3/UL (ref 0–0.7)
EOSINOPHIL NFR BLD AUTO: 6 %
ERYTHROCYTE [DISTWIDTH] IN BLOOD BY AUTOMATED COUNT: 13.2 % (ref 10–15)
FASTING STATUS PATIENT QL REPORTED: NO
FASTING STATUS PATIENT QL REPORTED: NO
GLUCOSE SERPL-MCNC: 93 MG/DL (ref 70–99)
HCO3 SERPL-SCNC: 27 MMOL/L (ref 22–29)
HCT VFR BLD AUTO: 42.2 % (ref 40–53)
HDLC SERPL-MCNC: 50 MG/DL
HGB BLD-MCNC: 14.6 G/DL (ref 13.3–17.7)
IMM GRANULOCYTES # BLD: 0 10E3/UL
IMM GRANULOCYTES NFR BLD: 0 %
LDLC SERPL CALC-MCNC: 91 MG/DL
LYMPHOCYTES # BLD AUTO: 1.9 10E3/UL (ref 0.8–5.3)
LYMPHOCYTES NFR BLD AUTO: 25 %
MCH RBC QN AUTO: 32.4 PG (ref 26.5–33)
MCHC RBC AUTO-ENTMCNC: 34.6 G/DL (ref 31.5–36.5)
MCV RBC AUTO: 94 FL (ref 78–100)
MONOCYTES # BLD AUTO: 0.7 10E3/UL (ref 0–1.3)
MONOCYTES NFR BLD AUTO: 10 %
NEUTROPHILS # BLD AUTO: 4.3 10E3/UL (ref 1.6–8.3)
NEUTROPHILS NFR BLD AUTO: 58 %
NONHDLC SERPL-MCNC: 125 MG/DL
NRBC # BLD AUTO: 0 10E3/UL
NRBC BLD AUTO-RTO: 0 /100
PLATELET # BLD AUTO: 175 10E3/UL (ref 150–450)
POTASSIUM SERPL-SCNC: 4.5 MMOL/L (ref 3.4–5.3)
PROT SERPL-MCNC: 7.3 G/DL (ref 6.4–8.3)
PSA SERPL DL<=0.01 NG/ML-MCNC: 15.53 NG/ML (ref 0–4.5)
RBC # BLD AUTO: 4.5 10E6/UL (ref 4.4–5.9)
SODIUM SERPL-SCNC: 141 MMOL/L (ref 135–145)
TRIGL SERPL-MCNC: 169 MG/DL
WBC # BLD AUTO: 7.3 10E3/UL (ref 4–11)

## 2025-04-21 PROCEDURE — 85004 AUTOMATED DIFF WBC COUNT: CPT | Mod: ZL

## 2025-04-21 PROCEDURE — 71271 CT THORAX LUNG CANCER SCR C-: CPT

## 2025-04-21 PROCEDURE — 36415 COLL VENOUS BLD VENIPUNCTURE: CPT | Mod: ZL

## 2025-04-21 PROCEDURE — 71271 CT THORAX LUNG CANCER SCR C-: CPT | Mod: 26 | Performed by: RADIOLOGY

## 2025-04-21 PROCEDURE — 80061 LIPID PANEL: CPT | Mod: ZL

## 2025-04-21 PROCEDURE — 82947 ASSAY GLUCOSE BLOOD QUANT: CPT | Mod: ZL

## 2025-04-21 PROCEDURE — 84153 ASSAY OF PSA TOTAL: CPT | Mod: ZL

## 2025-04-22 ENCOUNTER — OFFICE VISIT (OUTPATIENT)
Dept: FAMILY MEDICINE | Facility: OTHER | Age: 70
End: 2025-04-22
Attending: PHYSICIAN ASSISTANT
Payer: MEDICARE

## 2025-04-22 ENCOUNTER — TELEPHONE (OUTPATIENT)
Dept: FAMILY MEDICINE | Facility: OTHER | Age: 70
End: 2025-04-22

## 2025-04-22 VITALS
RESPIRATION RATE: 18 BRPM | BODY MASS INDEX: 22.51 KG/M2 | HEART RATE: 80 BPM | TEMPERATURE: 98.7 F | SYSTOLIC BLOOD PRESSURE: 116 MMHG | WEIGHT: 152 LBS | HEIGHT: 69 IN | DIASTOLIC BLOOD PRESSURE: 70 MMHG

## 2025-04-22 DIAGNOSIS — F41.9 ANXIETY: ICD-10-CM

## 2025-04-22 DIAGNOSIS — N52.9 ERECTILE DYSFUNCTION, UNSPECIFIED ERECTILE DYSFUNCTION TYPE: ICD-10-CM

## 2025-04-22 DIAGNOSIS — M54.2 NECK PAIN: ICD-10-CM

## 2025-04-22 DIAGNOSIS — K21.00 GASTROESOPHAGEAL REFLUX DISEASE WITH ESOPHAGITIS, UNSPECIFIED WHETHER HEMORRHAGE: ICD-10-CM

## 2025-04-22 DIAGNOSIS — C61 PROSTATE CANCER (H): ICD-10-CM

## 2025-04-22 DIAGNOSIS — E78.5 HYPERLIPIDEMIA, UNSPECIFIED HYPERLIPIDEMIA TYPE: ICD-10-CM

## 2025-04-22 DIAGNOSIS — Z00.00 ENCOUNTER FOR MEDICARE ANNUAL WELLNESS EXAM: Primary | ICD-10-CM

## 2025-04-22 PROBLEM — J43.9 MILD EMPHYSEMA (H): Status: RESOLVED | Noted: 2023-12-13 | Resolved: 2025-04-22

## 2025-04-22 PROCEDURE — G0463 HOSPITAL OUTPT CLINIC VISIT: HCPCS | Performed by: PHYSICIAN ASSISTANT

## 2025-04-22 RX ORDER — BUSPIRONE HYDROCHLORIDE 15 MG/1
15 TABLET ORAL 2 TIMES DAILY PRN
Qty: 180 TABLET | Refills: 4 | Status: SHIPPED | OUTPATIENT
Start: 2025-04-22

## 2025-04-22 RX ORDER — SILDENAFIL CITRATE 20 MG/1
TABLET ORAL
Qty: 30 TABLET | Refills: 11 | Status: SHIPPED | OUTPATIENT
Start: 2025-04-22

## 2025-04-22 RX ORDER — GABAPENTIN 300 MG/1
300 CAPSULE ORAL 2 TIMES DAILY PRN
Qty: 180 CAPSULE | Refills: 4 | Status: SHIPPED | OUTPATIENT
Start: 2025-04-22

## 2025-04-22 RX ORDER — ATORVASTATIN CALCIUM 20 MG/1
20 TABLET, FILM COATED ORAL AT BEDTIME
Qty: 90 TABLET | Refills: 4 | Status: SHIPPED | OUTPATIENT
Start: 2025-04-22

## 2025-04-22 RX ORDER — OMEPRAZOLE 20 MG/1
20 CAPSULE, DELAYED RELEASE ORAL DAILY PRN
Qty: 90 CAPSULE | Refills: 4 | Status: SHIPPED | OUTPATIENT
Start: 2025-04-22

## 2025-04-22 SDOH — HEALTH STABILITY: PHYSICAL HEALTH: ON AVERAGE, HOW MANY MINUTES DO YOU ENGAGE IN EXERCISE AT THIS LEVEL?: 20 MIN

## 2025-04-22 SDOH — HEALTH STABILITY: PHYSICAL HEALTH: ON AVERAGE, HOW MANY DAYS PER WEEK DO YOU ENGAGE IN MODERATE TO STRENUOUS EXERCISE (LIKE A BRISK WALK)?: 2 DAYS

## 2025-04-22 ASSESSMENT — PAIN SCALES - GENERAL: PAINLEVEL_OUTOF10: NO PAIN (0)

## 2025-04-22 ASSESSMENT — ANXIETY QUESTIONNAIRES
7. FEELING AFRAID AS IF SOMETHING AWFUL MIGHT HAPPEN: SEVERAL DAYS
6. BECOMING EASILY ANNOYED OR IRRITABLE: SEVERAL DAYS
5. BEING SO RESTLESS THAT IT IS HARD TO SIT STILL: SEVERAL DAYS
GAD7 TOTAL SCORE: 5
1. FEELING NERVOUS, ANXIOUS, OR ON EDGE: NOT AT ALL
GAD7 TOTAL SCORE: 5
GAD7 TOTAL SCORE: 5
4. TROUBLE RELAXING: SEVERAL DAYS
8. IF YOU CHECKED OFF ANY PROBLEMS, HOW DIFFICULT HAVE THESE MADE IT FOR YOU TO DO YOUR WORK, TAKE CARE OF THINGS AT HOME, OR GET ALONG WITH OTHER PEOPLE?: NOT DIFFICULT AT ALL
IF YOU CHECKED OFF ANY PROBLEMS ON THIS QUESTIONNAIRE, HOW DIFFICULT HAVE THESE PROBLEMS MADE IT FOR YOU TO DO YOUR WORK, TAKE CARE OF THINGS AT HOME, OR GET ALONG WITH OTHER PEOPLE: NOT DIFFICULT AT ALL
3. WORRYING TOO MUCH ABOUT DIFFERENT THINGS: SEVERAL DAYS
7. FEELING AFRAID AS IF SOMETHING AWFUL MIGHT HAPPEN: SEVERAL DAYS
2. NOT BEING ABLE TO STOP OR CONTROL WORRYING: NOT AT ALL

## 2025-04-22 ASSESSMENT — SOCIAL DETERMINANTS OF HEALTH (SDOH): HOW OFTEN DO YOU GET TOGETHER WITH FRIENDS OR RELATIVES?: THREE TIMES A WEEK

## 2025-04-22 NOTE — NURSING NOTE
"Patient presents to the clinic for medicare wellness.    Advance Care Directive on file? yes  Advance Care Directive provided to patient? Declined.      Chief Complaint   Patient presents with    Physical       Initial /70 (BP Location: Right arm, Patient Position: Sitting, Cuff Size: Adult Regular)   Pulse 80   Temp 98.7  F (37.1  C) (Tympanic)   Resp 18   Ht 1.74 m (5' 8.5\")   Wt 68.9 kg (152 lb)   BMI 22.78 kg/m   Estimated body mass index is 22.78 kg/m  as calculated from the following:    Height as of this encounter: 1.74 m (5' 8.5\").    Weight as of this encounter: 68.9 kg (152 lb).  Medication Reconciliation: complete        Mary Barney LPN     "

## 2025-04-22 NOTE — PATIENT INSTRUCTIONS
Patient Education   Preventive Care Advice   This is general advice given by our system to help you stay healthy. However, your care team may have specific advice just for you. Please talk to your care team about your preventive care needs.  Nutrition  Eat 5 or more servings of fruits and vegetables each day.  Try wheat bread, brown rice and whole grain pasta (instead of white bread, rice, and pasta).  Get enough calcium and vitamin D. Check the label on foods and aim for 100% of the RDA (recommended daily allowance).  Lifestyle  Exercise at least 150 minutes each week  (30 minutes a day, 5 days a week).  Do muscle strengthening activities 2 days a week. These help control your weight and prevent disease.  No smoking.  Wear sunscreen to prevent skin cancer.  Have a dental exam and cleaning every 6 months.  Yearly exams  See your health care team every year to talk about:  Any changes in your health.  Any medicines your care team has prescribed.  Preventive care, family planning, and ways to prevent chronic diseases.  Shots (vaccines)   HPV shots (up to age 26), if you've never had them before.  Hepatitis B shots (up to age 59), if you've never had them before.  COVID-19 shot: Get this shot when it's due.  Flu shot: Get a flu shot every year.  Tetanus shot: Get a tetanus shot every 10 years.  Pneumococcal, hepatitis A, and RSV shots: Ask your care team if you need these based on your risk.  Shingles shot (for age 50 and up)  General health tests  Diabetes screening:  Starting at age 35, Get screened for diabetes at least every 3 years.  If you are younger than age 35, ask your care team if you should be screened for diabetes.  Cholesterol test: At age 39, start having a cholesterol test every 5 years, or more often if advised.  Bone density scan (DEXA): At age 50, ask your care team if you should have this scan for osteoporosis (brittle bones).  Hepatitis C: Get tested at least once in your life.  STIs (sexually  transmitted infections)  Before age 24: Ask your care team if you should be screened for STIs.  After age 24: Get screened for STIs if you're at risk. You are at risk for STIs (including HIV) if:  You are sexually active with more than one person.  You don't use condoms every time.  You or a partner was diagnosed with a sexually transmitted infection.  If you are at risk for HIV, ask about PrEP medicine to prevent HIV.  Get tested for HIV at least once in your life, whether you are at risk for HIV or not.  Cancer screening tests  Cervical cancer screening: If you have a cervix, begin getting regular cervical cancer screening tests starting at age 21.  Breast cancer scan (mammogram): If you've ever had breasts, begin having regular mammograms starting at age 40. This is a scan to check for breast cancer.  Colon cancer screening: It is important to start screening for colon cancer at age 45.  Have a colonoscopy test every 10 years (or more often if you're at risk) Or, ask your provider about stool tests like a FIT test every year or Cologuard test every 3 years.  To learn more about your testing options, visit:   .  For help making a decision, visit:   https://bit.ly/zu29141.  Prostate cancer screening test: If you have a prostate, ask your care team if a prostate cancer screening test (PSA) at age 55 is right for you.  Lung cancer screening: If you are a current or former smoker ages 50 to 80, ask your care team if ongoing lung cancer screenings are right for you.  For informational purposes only. Not to replace the advice of your health care provider. Copyright   2023 [employer]. All rights reserved. Clinically reviewed by the [facility] Transitions Program. [facility] [id number] - REV 01/24.

## 2025-04-22 NOTE — TELEPHONE ENCOUNTER
Please let urology know that PSA rising, previously 12.37 in October 2024 and increased to 15.53 today. Wondering if urology wants to see Josué sooner than June.     Magalis Bird PA-C  4/22/2025  2:42 PM

## 2025-04-22 NOTE — TELEPHONE ENCOUNTER
[facility] reports they will contact provider/team to discuss the increase in PSA.      Mary Barney LPN 4/22/2025 3:35 PM

## 2025-04-22 NOTE — TELEPHONE ENCOUNTER
Patient called the Urology Team in [place].  They informed the patient that they didn't know what he was talking about and requested any information needed should be faxed to [phone].    Care Everywhere updated.    Mary Barney LPN 4/22/2025 2:40 PM

## 2025-04-22 NOTE — PROGRESS NOTES
Preventive Care Visit  [facility]  Magalis Bird PA-C, Family Medicine  Apr 22, 2025      Assessment & Plan       ICD-10-CM    1. Encounter for Medicare annual wellness exam  Z00.00       2. Prostate cancer (H)  C61       3. Erectile dysfunction, unspecified erectile dysfunction type  N52.9 sildenafil (REVATIO) 20 MG tablet      4. Hyperlipidemia, unspecified hyperlipidemia type  E78.5 atorvastatin (LIPITOR) 20 MG tablet      5. Anxiety  F41.9 busPIRone (BUSPAR) 15 MG tablet      6. Neck pain  M54.2 gabapentin (NEURONTIN) 300 MG capsule      7. Gastroesophageal reflux disease with esophagitis, unspecified whether hemorrhage  K21.00 omeprazole (PRILOSEC) 20 MG DR capsule        Annual physical. Due for updated colon cancer screening in 2028. Up to date on immunizations.   Prostate cancer - PSA rising, previously 12.37 in October 2024 and increased to 15.53 today. Recommend reaching out to urology as Josué may warrant further intervention - MRI, PET scan, biopsy, etc., will defer to urology. Josué will contact [facility] Urology team.   Erectile dysfunction - stable. Continues on Sildenafil. Does not take Sildenafil with use of other vasodilating agents.   Hyperlipidemia - stable. Lipid screening updated today. Cholesterol 175 (previously 173 in March 2024), triglycerides 169 (previously 147), HDL 50 (previously 43), LDL 91 (previously 101). Continue statin. Recommend increased lean proteins, fruits and vegetable intake. Goal exercise 150 minutes of moderate exercise per week (walking is great exercise).    The 10-year ASCVD risk score (John IVAN, et al., 2019) is: 12.5%    Values used to calculate the score:      Age: 69 years      Sex: Male      Is Non- : No      Diabetic: No      Tobacco smoker: No      Systolic Blood Pressure: 116 mmHg      Is BP treated: No      HDL Cholesterol: 50 mg/dL      Total Cholesterol: 175 mg/dL     Anxiety - stable with Buspar at  current dose. Aware of 211 and crisis line information. Refills updated.   Neck pain - continue with Gabapentin. No adverse effects. PDMP stable. Continue with multimodal approach to pain management. Refills updated today.   GERD - stable. Recommend to limit/avoid triggering agents such as: greasy/acidic/spicy foods, caffeine, alcohol, NSAIDs, tobacco. Reviewed risks, benefits and potential side effects of long term PPI use. Discussed with Josué he can either continue Omeprazole if desired or stop. If he opts to stop, I strongly encouraged him to taper medication - start by every other day dosing, followed by decreasing to every 3rd day, etc.     Patient has been advised of split billing requirements and indicates understanding: Yes    Counseling  Appropriate preventive services were addressed with this patient via screening, questionnaire, or discussion as appropriate for fall prevention, nutrition, physical activity, Tobacco-use cessation, social engagement, weight loss and cognition.  Checklist reviewing preventive services available has been given to the patient.  Reviewed patient's diet, addressing concerns and/or questions.   He is at risk for lack of exercise and has been provided with information to increase physical activity for the benefit of his well-being.   The patient was instructed to see the dentist every 6 months.     The longitudinal plan of care for the diagnosis(es)/condition(s) as documented were addressed during this visit. Due to the added complexity in care, I will continue to support Josué in the subsequent management and with ongoing continuity of care.    Tamara Moses is a 69 year old, presenting for the following:  Medicare Visit (wellness)        4/22/2025     1:02 PM   Additional Questions   Roomed by robert tijerina lpn     MICHELLE Moses presents to the clinic today for annual physical. I saw him last on 3/25/24.     Male Physical:  Cholesterol/DM concerns/screening: lab only visit on  25  Tobacco?: former smoker, quit in . 30 pack year history. CT lung cancer screening scheduled for 25.   Calcium intake: dietary  Colon cancer screenin2023 - 5 year follow up due to polyps (update in )  PSA screening: completed 25  Immunizations: tetanus 2017. Up to date on COVID and Influenza     Current medications:   - Gabapentin 300 mg BID for neck pain  - Flonase PRN  - Sildenafil  mg prior to intercourse for ED  - Lipitor 20 mg daily for hyperlipidemia. Declines any side effects such as arthralgias or myalgias.   - Buspar 15 mg BID PRN for anxiety.   - Omeprazole 20 mg PRN for GERD.   - Aspirin 81 mg daily.     Advance Care Planning  Discussed advance care planning with patient; informed AVS has link to [employer].        2025   General Health   How would you rate your overall physical health? Good   Feel stress (tense, anxious, or unable to sleep) Only a little   (!) STRESS CONCERN      2025   Nutrition   Diet: Regular (no restrictions)         2025   Exercise   Days per week of moderate/strenous exercise 2 days   Average minutes spent exercising at this level 20 min   (!) EXERCISE CONCERN      2025   Social Factors   Frequency of gathering with friends or relatives Three times a week   Worry food won't last until get money to buy more No   Food not last or not have enough money for food? No   Do you have housing? (Housing is defined as stable permanent housing and does not include staying ouside in a car, in a tent, in an abandoned building, in an overnight shelter, or couch-surfing.) Yes   Are you worried about losing your housing? No   Lack of transportation? No   Unable to get utilities (heat,electricity)? No         2025   Fall Risk   Fallen 2 or more times in the past year? No   Trouble with walking or balance? No          2025   Activities of Daily Living- Home Safety   Needs help with the following daily activites None of the  above   Safety concerns in the home None of the above         4/22/2025   Dental   Dentist two times every year? (!) NO         4/22/2025   Hearing Screening   Hearing concerns? None of the above         4/22/2025   Driving Risk Screening   Patient/family members have concerns about driving No         4/22/2025   General Alertness/Fatigue Screening   Have you been more tired than usual lately? No         4/22/2025   Urinary Incontinence Screening   Bothered by leaking urine in past 6 months No     Today's PHQ-9 Score:       4/22/2025    12:51 PM   PHQ-9 SCORE   PHQ-9 Total Score MyChart 0   PHQ-9 Total Score 0        Patient-reported         4/22/2025   Substance Use   Alcohol more than 3/day or more than 7/wk No   Do you have a current opioid prescription? No   How severe/bad is pain from 1 to 10? 0/10 (No Pain)   Do you use any other substances recreationally? No     Social History     Tobacco Use    Smoking status: Former     Current packs/day: 0.00     Average packs/day: 0.5 packs/day for 25.0 years (12.5 ttl pk-yrs)     Types: Cigarettes     Start date: 10/30/1989     Quit date: 10/30/2014     Years since quitting: 10.4    Smokeless tobacco: Former     Quit date: 2018   Vaping Use    Vaping status: Never Used   Substance Use Topics    Alcohol use: Yes     Comment: occasional    Drug use: No         4/22/2025   AAA Screening   Family history of Abdominal Aortic Aneurysm (AAA)? No   Last PSA:   PSA Tumor Marker   Date Value Ref Range Status   04/21/2025 15.53 (H) 0.00 - 4.50 ng/mL Final   09/26/2022 9.91 (H) 0.00 - 4.00 ug/L Final     ASCVD Risk   The 10-year ASCVD risk score (John IVAN, et al., 2019) is: 12.5%    Values used to calculate the score:      Age: 69 years      Sex: Male      Is Non- : No      Diabetic: No      Tobacco smoker: No      Systolic Blood Pressure: 116 mmHg      Is BP treated: No      HDL Cholesterol: 50 mg/dL      Total Cholesterol: 175 mg/dL    Reviewed  and updated as needed this visit by Provider   Tobacco  Allergies  Meds  Problems  Med Hx  Surg Hx  Fam Hx            Past Medical History:   Diagnosis Date    Abdominal aortic atherosclerosis     Anxiety     Cyst of right kidney     Diverticulitis of intestine without perforation or abscess without bleeding     No Comments Provided    Gout of foot     History of tobacco abuse     Hyperlipidemia     Prostate cancer (H)     Tubular adenoma      Past Surgical History:   Procedure Laterality Date    ARTHROSCOPY SHOULDER RT/LT Left 1980s    BIOPSY PROSTATE TRANSRECTAL  10/2016    COLONOSCOPY N/A 04/06/2018    F/U Colonoscopy 2023; tubular adenoma    COLONOSCOPY N/A 06/22/2023    2 Tubular Adenomas Follow up 5 yrs.     Current providers sharing in care for this patient include:  Patient Care Team:  Magalis Bird PA-C as PCP - General (Family Medicine)  Magalis Bird PA-C as Assigned PCP    The following health maintenance items are reviewed in Epic and correct as of today:  Health Maintenance   Topic Date Due    COVID-19 Vaccine (8 - 2024-25 season) 05/30/2025    LIPID  04/21/2026    LUNG CANCER SCREENING  04/21/2026    MEDICARE ANNUAL WELLNESS VISIT  04/22/2026    FALL RISK ASSESSMENT  04/22/2026    DTAP/TDAP/TD IMMUNIZATION (2 - Td or Tdap) 12/01/2027    DIABETES SCREENING  04/21/2028    COLORECTAL CANCER SCREENING  06/22/2028    ADVANCE CARE PLANNING  04/25/2029    HEPATITIS C SCREENING  Completed    COPD ACTION PLAN  Completed    PHQ-2 (once per calendar year)  Completed    INFLUENZA VACCINE  Completed    Pneumococcal Vaccine: 50+ Years  Completed    ZOSTER IMMUNIZATION  Completed    RSV VACCINE  Completed    AORTIC ANEURYSM SCREENING (SYSTEM ASSIGNED)  Completed    HPV IMMUNIZATION  Aged Out    MENINGITIS IMMUNIZATION  Aged Out    SPIROMETRY  Discontinued     Review of Systems  Constitutional, HEENT, cardiovascular, pulmonary, GI, , musculoskeletal, neuro, skin, endocrine and psych systems are  "negative, except as otherwise noted.    Objective    Exam  /70 (BP Location: Right arm, Patient Position: Sitting, Cuff Size: Adult Regular)   Pulse 80   Temp 98.7  F (37.1  C) (Tympanic)   Resp 18   Ht 1.74 m (5' 8.5\")   Wt 68.9 kg (152 lb)   BMI 22.78 kg/m     Estimated body mass index is 22.78 kg/m  as calculated from the following:    Height as of this encounter: 1.74 m (5' 8.5\").    Weight as of this encounter: 68.9 kg (152 lb).    Physical Exam  GENERAL: alert and no distress  EYES: Eyes grossly normal to inspection  HENT: normal cephalic/atraumatic, ear canals and TM's normal, nose and mouth without ulcers or lesions, oropharynx clear, and oral mucous membranes moist  NECK: no adenopathy, no asymmetry, masses, or scars  RESP: lungs clear to auscultation - no rales, rhonchi or wheezes  CV: regular rate and rhythm, normal S1 S2, no S3 or S4, no murmur, click or rub, no peripheral edema  ABDOMEN: soft, nontender, no hepatosplenomegaly, no masses and bowel sounds normal  MS: no gross musculoskeletal defects noted, no edema  SKIN: no suspicious lesions or rashes  PSYCH: mentation appears normal, affect normal/bright  LYMPH: normal ant/post cervical, supraclavicular nodes        4/22/2025   Mini Cog   Mini-Cog Not Completed (choose reason) Patient declines       Patient declines, there are NO concerns for cognitive deficits.    Results for orders placed or performed during the hospital encounter of 04/21/25 (from the past 48 hours)   CT Chest Lung Cancer Scrn Low Dose wo    Narrative    CT Low Dose Lung Cancer Screening    History: Screening for lung cancer, smoking.    Comparison: 4/10/2024    Technique: Helical acquisition Low dose CT chest. Images reviewed in  lung, soft tissue and bone windows.    Findings: [All follow up of nodules are based on ACR guidelines for  lung cancer screening and measurements of each nodule size must be the  mean of the longest 2 axial plane perpendiculars]    Nodules: " "None    Emphysema: Minimal    Mosaic attenuation: None    Bronchial wall thickening: None.    Bronchiectasis: None    Coronary artery calcium: Minimal    Other portions of the chest: The thoracic aorta is normal in caliber  with scattered atherosclerotic plaque. No lymphadenopathy in the  chest.     Upper abdomen: Nonobstructive right renal calculi. No acute findings.     Bones: No suspicious osseous lesions      Impression    Impression:   1. ACR Assessment Category:  Lung-RADS Category 1. Negative .     Recommendation:  Lung-RADS Category 1. Negative, continue annual  screening with Lung cancer screening CT (please order exam code IMG  2290) .     2. Significant Incidental Finding(s):  Category S: No.      Download the \"LungRADS Assessment Categories\" table at this site:   http://www.acr.org/Quality-Safety/Resources/LungRADS    RUBINA SANDHU MD         SYSTEM ID:  [id number]     Signed Electronically by: Magalis Bird PA-C    Answers submitted by the patient for this visit:  Patient Health Questionnaire (Submitted on 4/22/2025)  If you checked off any problems, how difficult have these problems made it for you to do your work, take care of things at home, or get along with other people?: Not difficult at all  PHQ9 TOTAL SCORE: 0  Patient Health Questionnaire (G7) (Submitted on 4/22/2025)  MINI 7 TOTAL SCORE: 5  "

## 2025-04-22 NOTE — TELEPHONE ENCOUNTER
No answer with either urology phone number provided/transferred to.      Mary Barney LPN 4/22/2025 2:58 PM

## 2025-07-16 ENCOUNTER — OFFICE VISIT (OUTPATIENT)
Dept: FAMILY MEDICINE | Facility: OTHER | Age: 70
End: 2025-07-16
Attending: PHYSICIAN ASSISTANT
Payer: MEDICARE

## 2025-07-16 VITALS
HEART RATE: 84 BPM | DIASTOLIC BLOOD PRESSURE: 66 MMHG | TEMPERATURE: 98.5 F | OXYGEN SATURATION: 97 % | BODY MASS INDEX: 22.07 KG/M2 | HEIGHT: 69 IN | WEIGHT: 149 LBS | SYSTOLIC BLOOD PRESSURE: 102 MMHG | RESPIRATION RATE: 20 BRPM

## 2025-07-16 DIAGNOSIS — M54.2 NECK PAIN: ICD-10-CM

## 2025-07-16 DIAGNOSIS — C61 PROSTATE CANCER (H): ICD-10-CM

## 2025-07-16 DIAGNOSIS — K21.00 GASTROESOPHAGEAL REFLUX DISEASE WITH ESOPHAGITIS, UNSPECIFIED WHETHER HEMORRHAGE: ICD-10-CM

## 2025-07-16 DIAGNOSIS — N52.9 ERECTILE DYSFUNCTION, UNSPECIFIED ERECTILE DYSFUNCTION TYPE: ICD-10-CM

## 2025-07-16 DIAGNOSIS — E78.5 HYPERLIPIDEMIA, UNSPECIFIED HYPERLIPIDEMIA TYPE: ICD-10-CM

## 2025-07-16 DIAGNOSIS — F41.9 ANXIETY: ICD-10-CM

## 2025-07-16 DIAGNOSIS — Z01.818 PREOP GENERAL PHYSICAL EXAM: Primary | ICD-10-CM

## 2025-07-16 LAB
ANION GAP SERPL CALCULATED.3IONS-SCNC: 10 MMOL/L (ref 7–15)
ATRIAL RATE - MUSE: 64 BPM
BASOPHILS # BLD AUTO: 0.1 10E3/UL (ref 0–0.2)
BASOPHILS NFR BLD AUTO: 1 %
BUN SERPL-MCNC: 18.3 MG/DL (ref 8–23)
CALCIUM SERPL-MCNC: 9.7 MG/DL (ref 8.8–10.4)
CHLORIDE SERPL-SCNC: 104 MMOL/L (ref 98–107)
CREAT SERPL-MCNC: 0.9 MG/DL (ref 0.67–1.17)
DIASTOLIC BLOOD PRESSURE - MUSE: NORMAL MMHG
EGFRCR SERPLBLD CKD-EPI 2021: >90 ML/MIN/1.73M2
EOSINOPHIL # BLD AUTO: 0.8 10E3/UL (ref 0–0.7)
EOSINOPHIL NFR BLD AUTO: 9 %
ERYTHROCYTE [DISTWIDTH] IN BLOOD BY AUTOMATED COUNT: 12.7 % (ref 10–15)
GLUCOSE SERPL-MCNC: 98 MG/DL (ref 70–99)
HCO3 SERPL-SCNC: 27 MMOL/L (ref 22–29)
HCT VFR BLD AUTO: 40.9 % (ref 40–53)
HGB BLD-MCNC: 14.1 G/DL (ref 13.3–17.7)
IMM GRANULOCYTES # BLD: 0 10E3/UL
IMM GRANULOCYTES NFR BLD: 0 %
INTERPRETATION ECG - MUSE: NORMAL
LYMPHOCYTES # BLD AUTO: 2.3 10E3/UL (ref 0.8–5.3)
LYMPHOCYTES NFR BLD AUTO: 24 %
MCH RBC QN AUTO: 32.7 PG (ref 26.5–33)
MCHC RBC AUTO-ENTMCNC: 34.5 G/DL (ref 31.5–36.5)
MCV RBC AUTO: 95 FL (ref 78–100)
MONOCYTES # BLD AUTO: 0.7 10E3/UL (ref 0–1.3)
MONOCYTES NFR BLD AUTO: 8 %
NEUTROPHILS # BLD AUTO: 5.4 10E3/UL (ref 1.6–8.3)
NEUTROPHILS NFR BLD AUTO: 58 %
NRBC # BLD AUTO: 0 10E3/UL
NRBC BLD AUTO-RTO: 0 /100
P AXIS - MUSE: 61 DEGREES
PLATELET # BLD AUTO: 188 10E3/UL (ref 150–450)
POTASSIUM SERPL-SCNC: 4.3 MMOL/L (ref 3.4–5.3)
PR INTERVAL - MUSE: 156 MS
PSA SERPL DL<=0.01 NG/ML-MCNC: 18.98 NG/ML (ref 0–4.5)
QRS DURATION - MUSE: 130 MS
QT - MUSE: 420 MS
QTC - MUSE: 433 MS
R AXIS - MUSE: -30 DEGREES
RBC # BLD AUTO: 4.31 10E6/UL (ref 4.4–5.9)
SODIUM SERPL-SCNC: 141 MMOL/L (ref 135–145)
SYSTOLIC BLOOD PRESSURE - MUSE: NORMAL MMHG
T AXIS - MUSE: 26 DEGREES
VENTRICULAR RATE- MUSE: 64 BPM
WBC # BLD AUTO: 9.3 10E3/UL (ref 4–11)

## 2025-07-16 PROCEDURE — G0463 HOSPITAL OUTPT CLINIC VISIT: HCPCS

## 2025-07-16 PROCEDURE — 80048 BASIC METABOLIC PNL TOTAL CA: CPT | Mod: ZL | Performed by: PHYSICIAN ASSISTANT

## 2025-07-16 PROCEDURE — 36415 COLL VENOUS BLD VENIPUNCTURE: CPT | Mod: ZL | Performed by: PHYSICIAN ASSISTANT

## 2025-07-16 PROCEDURE — 84153 ASSAY OF PSA TOTAL: CPT | Mod: ZL | Performed by: PHYSICIAN ASSISTANT

## 2025-07-16 PROCEDURE — 85004 AUTOMATED DIFF WBC COUNT: CPT | Mod: ZL | Performed by: PHYSICIAN ASSISTANT

## 2025-07-16 ASSESSMENT — PATIENT HEALTH QUESTIONNAIRE - PHQ9
SUM OF ALL RESPONSES TO PHQ QUESTIONS 1-9: 4
10. IF YOU CHECKED OFF ANY PROBLEMS, HOW DIFFICULT HAVE THESE PROBLEMS MADE IT FOR YOU TO DO YOUR WORK, TAKE CARE OF THINGS AT HOME, OR GET ALONG WITH OTHER PEOPLE: NOT DIFFICULT AT ALL
SUM OF ALL RESPONSES TO PHQ QUESTIONS 1-9: 4

## 2025-07-16 ASSESSMENT — PAIN SCALES - GENERAL: PAINLEVEL_OUTOF10: NO PAIN (0)

## 2025-07-16 NOTE — PATIENT INSTRUCTIONS
How to Take Your Medication Before Surgery  Preoperative Medication Instructions   Hold: vitamins and supplements for 10-14 days prior to surgery    Hold: NSAIDS (Ibuprofen, Naproxen, Aleve) for 7-days prior to surgery. Tylenol is OK.     Hold Sildenafil the night before/day of surgery    Continue: Atorvastatin (Lipitor), Gabapentin (Neurontin), Buspirone (Buspar), Omeprazole (Prilosec), Flonase if needed    Patient Education   Preparing for Your Surgery  For Adults  Getting started  In most cases, a nurse will call to review your health history and instructions. They will give you an arrival time based on your scheduled surgery time. Please be ready to share:  Your doctor's clinic name and phone number  Your medical, surgical, and anesthesia history  A list of allergies and sensitivities  A list of medicines, including herbal treatments and over-the-counter drugs  Whether the patient has a legal guardian (ask how to send us the papers in advance)  Note: You may not receive a call if you were seen at our PAC (Preoperative Assessment Center).  Please tell us if you're pregnant--or if there's any chance you might be pregnant. Some surgeries may injure a fetus (unborn baby), so they require a pregnancy test. Surgeries that are safe for a fetus don't always need a test, and you can choose whether to have one.   Preparing for surgery  Within 10 to 30 days of surgery: Have a pre-op exam (sometimes called an H&P, or History and Physical). This can be done at a clinic or pre-operative center.  If you're having a , you may not need this exam. Talk to your care team.  At your pre-op exam, talk to your care team about all medicines you take. (This includes CBD oil and any drugs, such as THC, marijuana, and other forms of cannabis.) If you need to stop any medicine before surgery, ask when to start taking it again.  This is for your safety. Many medicines and drugs can make you bleed too much during surgery. Some  change how well surgery (anesthesia) drugs work.  Call your insurance company to let them know you're having surgery. (If you don't have insurance, call [phone].)  Call your clinic if there's any change in your health. This includes a scrape or scratch near the surgery site, or any signs of a cold (sore throat, runny nose, cough, rash, fever).  Eating and drinking guidelines  For your safety: Unless your surgeon tells you otherwise, follow the guidelines below.  Eat and drink as normal until 8 hours before you arrive for surgery. After that, no food or milk. You can spit out gum when you arrive.  Drink clear liquids until 2 hours before you arrive. These are liquids you can see through, like water, Gatorade, and Propel Water. They also include plain black coffee and tea (no cream or milk).  No alcohol for 24 hours before you arrive. The night before surgery, stop any drinks that contain THC.  If your care team tells you to take medicine on the morning of surgery, it's okay to take it with a sip of water. No other medicines or drugs are allowed (including CBD oil)--follow your care team's instructions.  If you have questions the day of surgery, call your hospital or surgery center.   Preventing infection  Shower or bathe the night before and the morning of surgery. Follow the instructions your clinic gave you. (If no instructions, use regular soap.)  Don't shave or clip hair near your surgery site. We'll remove the hair if needed.  Don't smoke or vape the morning of surgery. No chewing tobacco for 6 hours before you arrive. A nicotine patch is okay. You may spit out nicotine gum when you arrive.  For some surgeries, the surgeon will tell you to fully quit smoking and nicotine.  We will make every effort to keep you safe from infection. We will:  Clean our hands often with soap and water (or an alcohol-based hand rub).  Clean the skin at your surgery site with a special soap that kills germs.  Give you a special  gown to keep you warm. (Cold raises the risk of infection.)  Wear hair covers, masks, gowns, and gloves during surgery.  Give antibiotic medicine, if prescribed. Not all surgeries need this medicine.  What to bring on the day of surgery  Photo ID and insurance card  Copy of your health care directive, if you have one  Glasses and hearing aids (bring cases)  You can't wear contacts during surgery  Inhaler and eye drops, if you use them (tell us about these when you arrive)  CPAP machine or breathing device, if you use them  A few personal items, if spending the night  If you have . . .  A pacemaker, ICD (cardiac defibrillator), or other implant: Bring the ID card.  An implanted stimulator: Bring the remote control.  A legal guardian: Bring a copy of the certified (court-stamped) guardianship papers.  Please remove any jewelry, including body piercings. Leave jewelry and other valuables at home.  If you're going home the day of surgery  You must have a support person drive you home. They should stay with you overnight, and they may need to help with your self-care.  If you don't have a support person, please tells us as soon as possible. We can help.  After surgery  If it's hard to control your pain or you need more pain medicine, please call your surgeon's office.  Questions?   If you have any questions for your care team, list them here:   ____________________________________________________________________________________________________________________________________________________________________________________________________________________________________________________________  For informational purposes only. Not to replace the advice of your health care provider. Copyright   2003, 2019 [facility]. All rights reserved. Clinically reviewed by Johnathon Pack MD. [employer] [id number] - REV 02/25.

## 2025-07-16 NOTE — PROGRESS NOTES
Preoperative Evaluation  [facility]  [address]  [place] [address]  Phone: [phone]  Fax: [phone]  Primary Provider: Magalis Bird PA-C  Pre-op Performing Provider: Magalis Bird PA-C  Jul 16, 2025 7/16/2025   Surgical Information   What procedure is being done? Prostate Biopsy   Facility or Hospital where procedure/surgery will be performed: [facility]   Who is doing the procedure / surgery? Noe Carias MD   Date of surgery / procedure: 7-23-25   Time of surgery / procedure: TBD   Where do you plan to recover after surgery? at home alone     Fax number for surgical facility: [phone]    Assessment & Plan     The proposed surgical procedure is considered INTERMEDIATE risk.      ICD-10-CM    1. Preop general physical exam  Z01.818 CBC and Differential     Basic Metabolic Panel     EKG 12-lead, tracing only (Same Day)     CBC and Differential     Basic Metabolic Panel      2. Prostate cancer (H)  C61 Prostate Specific Antigen     Prostate Specific Antigen      3. Erectile dysfunction, unspecified erectile dysfunction type  N52.9       4. Hyperlipidemia, unspecified hyperlipidemia type  E78.5       5. Anxiety  F41.9       6. Gastroesophageal reflux disease with esophagitis, unspecified whether hemorrhage  K21.00       7. Neck pain  M54.2          Vital signs are stable. Josué presented for preoperative evaluation prior to upcoming proposed procedure. Lab work updated today including CBC and BMP. CBC - hemoglobin 14.1, hematocrit 40.9. BMP - GFR 90, creatinine 0.90. EKG indicated and ordered. Reviewed hold times as outlined below. Josué is in agreement and understanding of the above treatment plan. All questions and concerns were addressed and answered to Josué's satisfaction. AVS reviewed with Josué.   Prostate cancer, rational for upcoming proposed procedure. Josué is aware of risks and benefits. Josué will follow postoperatively with  surgeonRoderick Moses asked for updated PSA today, PSA returned at 18.98 (previously 15.53 on 4/21/25), upward trending, recommend to proceed with biopsy.  Erectile dysfunction, stable with as needed use of sildenafil.  Plan to hold sildenafil the night before/day of surgery to prevent hypotension.  Return precautions reviewed.  Mixed hyperlipidemia, stable.  Continue with lifestyle modifications plus Lipitor. Recommend increased lean proteins, fruits and vegetable intake. Goal exercise 150 minutes of moderate exercise per week (walking is great exercise).  Generalized anxiety, stable.  Aware of 211/crisis line information.  Continued supportive relationships with family and friends, continue to participate in activities that bring him jovan.  Continue current medication regimen.  Up-to-date on refills.  GERD, Recommend to limit/avoid triggering agents such as: greasy/acidic/spicy foods, caffeine, alcohol, NSAIDs, etc. continue omeprazole as needed.  Return precautions reviewed.  Neck pain, stable.  Continue gabapentin as part of multimodal approach to pain management.  Recommend as part of multimodal approach to pain management options such as: stretching, chiropractic care, massage, topical agents such as IcyHot, Bengay, Voltaren, etc.     - No identified additional risk factors other than previously addressed    Preoperative Medication Instructions  Hold: vitamins and supplements for 10-14 days prior to surgery    Hold: NSAIDS (Ibuprofen, Naproxen, Aleve) for 7-days prior to surgery. Tylenol is OK.     Hold Sildenafil the night before/day of surgery    Continue: Atorvastatin (Lipitor), Gabapentin (Neurontin), Buspirone (Buspar), Omeprazole (Prilosec), Flonase if needed    Recommendation  Approval given to proceed with proposed procedure, without further diagnostic evaluation.    The longitudinal plan of care for the diagnosis(es)/condition(s) as documented were addressed during this visit. Due to the added complexity in  care, I will continue to support Josué in the subsequent management and with ongoing continuity of care.    Follow-up  No follow-ups on file.    Subjective   Josué is a 69 year old, presenting for the following:  Pre-Op Exam        7/16/2025     3:45 PM   Additional Questions   Roomed by robert tijerina lpn     HPI: Josué presents to the clinic today for preoperative evaluation prior to upcoming prostatic biopsy with Dr. Aretha MD of [facility] due to progressive PSA rise.     Diagnosis of prostate cancer in 2013. Follow up surveillance biopsies have been negative thus far. Previously following with Dr. Dawson and transitioned care to [facility] after Dr. Dawson departed the clinic.      Last PSA 15.53 on 4/21/25 at Josué's physical with me. I recommended urologic consultation.     Current medications:   - Gabapentin 300 mg BID for neck pain  - Flonase PRN for seasonal allergic rhinitis  - Sildenafil  mg prior to intercourse for ED  - Lipitor 20 mg daily for hyperlipidemia. Declines any side effects such as arthralgias or myalgias.   - Buspar 15 mg BID PRN for anxiety.   - Omeprazole 20 mg PRN for GERD.         7/16/2025   Pre-Op Questionnaire   Have you ever had a heart attack or stroke? No   Have you ever had surgery on your heart or blood vessels, such as a stent placement, a coronary artery bypass, or surgery on an artery in your head, neck, heart, or legs? No   Do you have chest pain with activity? No   Do you have a history of heart failure? No   Do you currently have a cold, bronchitis or symptoms of other infection? No   Do you have a cough, shortness of breath, or wheezing? No   Do you or anyone in your family have previous history of blood clots? (!) UNKNOWN   Do you or does anyone in your family have a serious bleeding problem such as prolonged bleeding following surgeries or cuts? (!) UNKNOWN   Have you ever had problems with anemia or been told to take iron pills? No   Have you had any  abnormal blood loss such as black, tarry or bloody stools? No   Have you ever had a blood transfusion? No   Are you willing to have a blood transfusion if it is medically needed before, during, or after your surgery? Yes   Have you or any of your relatives ever had problems with anesthesia? No   Do you have sleep apnea, excessive snoring or daytime drowsiness? No   Do you have any artifical heart valves or other implanted medical devices like a pacemaker, defibrillator, or continuous glucose monitor? No   Do you have artificial joints? No   Are you allergic to latex? No     Advance Care Planning  Discussed advance care planning with patient; informed AVS has link to [employer].    Preoperative Review of    reviewed - controlled substances reflected in medication list.    Status of Chronic Conditions:  See problem list for active medical problems.  Problems all longstanding and stable, except as noted/documented.  See ROS for pertinent symptoms related to these conditions.    Patient Active Problem List    Diagnosis Date Noted    Gastroesophageal reflux disease with esophagitis, unspecified whether hemorrhage 07/16/2025     Priority: Medium    Neck pain 07/16/2025     Priority: Medium    Erectile dysfunction, unspecified erectile dysfunction type 07/16/2025     Priority: Medium    Gastroesophageal reflux disease without esophagitis 02/20/2020     Priority: Medium    H/O adenomatous polyp of colon 09/18/2018     Priority: Medium    Diverticulitis of large intestine with perforation without bleeding 09/18/2018     Priority: Medium    Abdominal aortic atherosclerosis 10/30/2016     Priority: Medium    Cyst of right kidney 10/30/2016     Priority: Medium    History of tobacco abuse 10/30/2016     Priority: Medium    Non-recurrent unilateral inguinal hernia without obstruction or gangrene 10/30/2016     Priority: Medium    Anxiety 11/08/2013     Priority: Medium    Prostate cancer on active surveillance since  2013 11/08/2013     Priority: Medium    Hyperlipidemia 09/21/2012     Priority: Medium      Past Medical History:   Diagnosis Date    Abdominal aortic atherosclerosis     Anxiety     Cyst of right kidney     Diverticulitis of intestine without perforation or abscess without bleeding     No Comments Provided    Gout of foot     History of tobacco abuse     Hyperlipidemia     Prostate cancer (H)     Tubular adenoma      Past Surgical History:   Procedure Laterality Date    ARTHROSCOPY SHOULDER RT/LT Left 1980s    BIOPSY PROSTATE TRANSRECTAL  10/2016    COLONOSCOPY N/A 04/06/2018    F/U Colonoscopy 2023; tubular adenoma    COLONOSCOPY N/A 06/22/2023    2 Tubular Adenomas Follow up 5 yrs.     Current Outpatient Medications   Medication Sig Dispense Refill    ASPIRIN ADULT LOW STRENGTH PO Take 81 mg by mouth daily      atorvastatin (LIPITOR) 20 MG tablet Take 1 tablet (20 mg) by mouth at bedtime. 90 tablet 4    busPIRone (BUSPAR) 15 MG tablet Take 1 tablet (15 mg) by mouth 2 times daily as needed (for anxiety). 180 tablet 4    Calcium Carbonate-Vit D-Min (SM CALCIUM/VITAMIN D3) 600-800 MG-UNIT TABS Take 1 tablet by mouth daily 360 tablet 0    fish oil-omega-3 fatty acids 1000 MG capsule Take 2 g by mouth daily      fluticasone (FLONASE) 50 MCG/ACT nasal spray Spray 1 spray into both nostrils daily. 16 mL 4    gabapentin (NEURONTIN) 300 MG capsule Take 1 capsule (300 mg) by mouth 2 times daily as needed (neck pain). 180 capsule 4    omeprazole (PRILOSEC) 20 MG DR capsule Take 1 capsule (20 mg) by mouth daily as needed (GERD). 90 capsule 4    psyllium (METAMUCIL/KONSYL) 58.6 % powder Take 18 g by mouth daily 425 g 3    sildenafil (REVATIO) 20 MG tablet Take 3-5 tablets (60-100mg) by mouth 1 hour prior to activity on an empty stomach 30 tablet 11    triamcinolone (NASACORT) 55 MCG/ACT nasal aerosol Spray 2 sprays into both nostrils daily 10.8 mL 0     No Known Allergies     Social History     Tobacco Use    Smoking status:  "Former     Current packs/day: 0.00     Average packs/day: 0.5 packs/day for 25.0 years (12.5 ttl pk-yrs)     Types: Cigarettes     Start date: 10/30/1989     Quit date: 10/30/2014     Years since quitting: 10.7    Smokeless tobacco: Former     Quit date: 2018   Substance Use Topics    Alcohol use: Yes     Comment: occasional     Family History   Problem Relation Age of Onset    No Known Problems Other     Emphysema Mother     Other - See Comments Father         91, multiple medical problems    Other - See Comments Brother         MVA    Other - See Comments Brother         Multiple health problems    Heart Disease Brother         valve problems       History   Drug Use No     Review of Systems  Constitutional, HEENT, cardiovascular, pulmonary, GI, , musculoskeletal, neuro, skin, endocrine and psych systems are negative, except as otherwise noted.    Objective    /66 (BP Location: Right arm, Patient Position: Sitting, Cuff Size: Adult Regular)   Pulse 84   Temp 98.5  F (36.9  C) (Tympanic)   Resp 20   Ht 1.74 m (5' 8.5\")   Wt 67.6 kg (149 lb)   SpO2 97%   BMI 22.33 kg/m     Estimated body mass index is 22.33 kg/m  as calculated from the following:    Height as of this encounter: 1.74 m (5' 8.5\").    Weight as of this encounter: 67.6 kg (149 lb).  Physical Exam  GENERAL: alert and no distress  EYES: Eyes grossly normal to inspection  RESP: lungs clear to auscultation - no rales, rhonchi or wheezes  CV: regular rate and rhythm, normal S1 S2, no S3 or S4, no murmur, click or rub, no peripheral edema  MS: no gross musculoskeletal defects noted, no edema  SKIN: no suspicious lesions or rashes  NEURO: Normal strength and tone, mentation intact and speech normal  PSYCH: mentation appears normal, affect normal/bright    Recent Labs   Lab Test 04/21/25  1049   HGB 14.6         POTASSIUM 4.5   CR 0.81      Diagnostics  Recent Results (from the past 24 hours)   EKG 12-lead, tracing only (Same Day)    " Collection Time: 07/16/25  4:10 PM   Result Value Ref Range    Systolic Blood Pressure  mmHg    Diastolic Blood Pressure  mmHg    Ventricular Rate 64 BPM    Atrial Rate 64 BPM    OR Interval 156 ms    QRS Duration 130 ms     ms    QTc 433 ms    P Axis 61 degrees    R AXIS -30 degrees    T Axis 26 degrees    Interpretation ECG       Sinus rhythm  Left axis deviation  Right bundle branch block  Abnormal ECG  No previous ECGs available     Basic Metabolic Panel    Collection Time: 07/16/25  4:50 PM   Result Value Ref Range    Sodium 141 135 - 145 mmol/L    Potassium 4.3 3.4 - 5.3 mmol/L    Chloride 104 98 - 107 mmol/L    Carbon Dioxide (CO2) 27 22 - 29 mmol/L    Anion Gap 10 7 - 15 mmol/L    Urea Nitrogen 18.3 8.0 - 23.0 mg/dL    Creatinine 0.90 0.67 - 1.17 mg/dL    GFR Estimate >90 >60 mL/min/1.73m2    Calcium 9.7 8.8 - 10.4 mg/dL    Glucose 98 70 - 99 mg/dL   Prostate Specific Antigen    Collection Time: 07/16/25  4:50 PM   Result Value Ref Range    PSA Tumor Marker 18.98 (H) 0.00 - 4.50 ng/mL   CBC with platelets and differential    Collection Time: 07/16/25  4:50 PM   Result Value Ref Range    WBC Count 9.3 4.0 - 11.0 10e3/uL    RBC Count 4.31 (L) 4.40 - 5.90 10e6/uL    Hemoglobin 14.1 13.3 - 17.7 g/dL    Hematocrit 40.9 40.0 - 53.0 %    MCV 95 78 - 100 fL    MCH 32.7 26.5 - 33.0 pg    MCHC 34.5 31.5 - 36.5 g/dL    RDW 12.7 10.0 - 15.0 %    Platelet Count 188 150 - 450 10e3/uL    % Neutrophils 58 %    % Lymphocytes 24 %    % Monocytes 8 %    % Eosinophils 9 %    % Basophils 1 %    % Immature Granulocytes 0 %    NRBCs per 100 WBC 0 <1 /100    Absolute Neutrophils 5.4 1.6 - 8.3 10e3/uL    Absolute Lymphocytes 2.3 0.8 - 5.3 10e3/uL    Absolute Monocytes 0.7 0.0 - 1.3 10e3/uL    Absolute Eosinophils 0.8 (H) 0.0 - 0.7 10e3/uL    Absolute Basophils 0.1 0.0 - 0.2 10e3/uL    Absolute Immature Granulocytes 0.0 <=0.4 10e3/uL    Absolute NRBCs 0.0 10e3/uL      EKG: appears normal, NSR, no LVH by voltage criteria,  unchanged from previous tracings    Revised Cardiac Risk Index (RCRI)  The patient has the following serious cardiovascular risks for perioperative complications:   - No serious cardiac risks = 0 points     RCRI Interpretation: 0 points: Class I (very low risk - 0.4% complication rate)    Signed Electronically by: Magalis Bird PA-C  A copy of this evaluation report is provided to the requesting physician.    Answers submitted by the patient for this visit:  Patient Health Questionnaire (Submitted on 7/16/2025)  If you checked off any problems, how difficult have these problems made it for you to do your work, take care of things at home, or get along with other people?: Not difficult at all  PHQ9 TOTAL SCORE: 4

## 2025-07-21 DIAGNOSIS — J30.1 ALLERGIC RHINITIS DUE TO POLLEN, UNSPECIFIED SEASONALITY: ICD-10-CM

## 2025-07-21 RX ORDER — FLUTICASONE PROPIONATE 50 MCG
1 SPRAY, SUSPENSION (ML) NASAL DAILY
Qty: 16 ML | Refills: 4 | Status: SHIPPED | OUTPATIENT
Start: 2025-07-21

## (undated) DEVICE — ENDO KIT COMPLIANCE DYKENDOCMPLY

## (undated) DEVICE — TUBING SUCTION 10'X3/16" N510

## (undated) DEVICE — SOL WATER 1500ML

## (undated) DEVICE — SUCTION MANIFOLD NEPTUNE 2 SYS 4 PORT 0702-020-000

## (undated) DEVICE — ENDO BITE BLOCK 60 MAXI LF 00712804

## (undated) DEVICE — COLD SNARE

## (undated) DEVICE — ENDO SNARE EXACTO COLD 9MM LOOP 2.4MMX230CM 00711115

## (undated) DEVICE — ENDO FORCEP ENDOJAW BIOPSY 2.8MMX230CM FB-220U

## (undated) DEVICE — SYR 50ML LL W/O NDL 309653

## (undated) DEVICE — ENDO BRUSH CHANNEL MASTER CLEANING 2-4.2MM BW-412T

## (undated) DEVICE — Device

## (undated) RX ORDER — LIDOCAINE HYDROCHLORIDE 20 MG/ML
INJECTION, SOLUTION EPIDURAL; INFILTRATION; INTRACAUDAL; PERINEURAL
Status: DISPENSED
Start: 2020-02-06

## (undated) RX ORDER — IBUPROFEN 400 MG/1
TABLET, FILM COATED ORAL
Status: DISPENSED
Start: 2018-09-17

## (undated) RX ORDER — LIDOCAINE HYDROCHLORIDE 20 MG/ML
INJECTION, SOLUTION EPIDURAL; INFILTRATION; INTRACAUDAL; PERINEURAL
Status: DISPENSED
Start: 2018-04-06

## (undated) RX ORDER — PROPOFOL 10 MG/ML
INJECTION, EMULSION INTRAVENOUS
Status: DISPENSED
Start: 2018-04-06

## (undated) RX ORDER — PROPOFOL 10 MG/ML
INJECTION, EMULSION INTRAVENOUS
Status: DISPENSED
Start: 2020-02-06

## (undated) RX ORDER — IBUPROFEN 200 MG
TABLET ORAL
Status: DISPENSED
Start: 2018-09-17

## (undated) RX ORDER — SODIUM CHLORIDE 9 MG/ML
INJECTION, SOLUTION INTRAVENOUS
Status: DISPENSED
Start: 2018-09-18

## (undated) RX ORDER — FENTANYL CITRATE 50 UG/ML
INJECTION, SOLUTION INTRAMUSCULAR; INTRAVENOUS
Status: DISPENSED
Start: 2018-09-18

## (undated) RX ORDER — PROPOFOL 10 MG/ML
INJECTION, EMULSION INTRAVENOUS
Status: DISPENSED
Start: 2023-06-22